# Patient Record
Sex: FEMALE | Race: WHITE | NOT HISPANIC OR LATINO | Employment: UNEMPLOYED | URBAN - NONMETROPOLITAN AREA
[De-identification: names, ages, dates, MRNs, and addresses within clinical notes are randomized per-mention and may not be internally consistent; named-entity substitution may affect disease eponyms.]

---

## 2020-02-26 ENCOUNTER — HOSPITAL ENCOUNTER (INPATIENT)
Facility: HOSPITAL | Age: 80
LOS: 16 days | Discharge: HOME HEALTH | End: 2020-03-13
Attending: INTERNAL MEDICINE | Admitting: INTERNAL MEDICINE

## 2020-02-26 DIAGNOSIS — I61.9 NONTRAUMATIC INTRACEREBRAL HEMORRHAGE, UNSPECIFIED CEREBRAL LOCATION, UNSPECIFIED LATERALITY (HCC): Primary | ICD-10-CM

## 2020-02-26 RX ORDER — IMIPRAMINE HYDROCHLORIDE 10 MG/1
10 TABLET, FILM COATED ORAL EVERY MORNING
COMMUNITY

## 2020-02-26 RX ORDER — POTASSIUM CHLORIDE 750 MG/1
10 TABLET, FILM COATED, EXTENDED RELEASE ORAL DAILY
Status: DISCONTINUED | OUTPATIENT
Start: 2020-02-27 | End: 2020-03-13 | Stop reason: HOSPADM

## 2020-02-26 RX ORDER — CALCIUM CARBONATE 200(500)MG
1 TABLET,CHEWABLE ORAL 2 TIMES DAILY PRN
Status: DISCONTINUED | OUTPATIENT
Start: 2020-02-26 | End: 2020-03-13 | Stop reason: HOSPADM

## 2020-02-26 RX ORDER — POTASSIUM CHLORIDE 750 MG/1
10 TABLET, FILM COATED, EXTENDED RELEASE ORAL DAILY
COMMUNITY

## 2020-02-26 RX ORDER — FUROSEMIDE 20 MG/1
20 TABLET ORAL DAILY
COMMUNITY

## 2020-02-26 RX ORDER — FAMOTIDINE 20 MG/1
40 TABLET, FILM COATED ORAL DAILY
Status: DISCONTINUED | OUTPATIENT
Start: 2020-02-27 | End: 2020-03-13 | Stop reason: HOSPADM

## 2020-02-26 RX ORDER — ATORVASTATIN CALCIUM 40 MG/1
40 TABLET, FILM COATED ORAL NIGHTLY
Status: DISCONTINUED | OUTPATIENT
Start: 2020-02-26 | End: 2020-03-13 | Stop reason: HOSPADM

## 2020-02-26 RX ORDER — FUROSEMIDE 20 MG/1
20 TABLET ORAL DAILY
Status: DISCONTINUED | OUTPATIENT
Start: 2020-02-27 | End: 2020-03-13 | Stop reason: HOSPADM

## 2020-02-26 RX ORDER — ASPIRIN 81 MG/1
81 TABLET, CHEWABLE ORAL DAILY
Status: DISCONTINUED | OUTPATIENT
Start: 2020-02-27 | End: 2020-03-13 | Stop reason: HOSPADM

## 2020-02-26 RX ORDER — BISACODYL 5 MG/1
5 TABLET, DELAYED RELEASE ORAL DAILY PRN
Status: DISCONTINUED | OUTPATIENT
Start: 2020-02-26 | End: 2020-03-13 | Stop reason: HOSPADM

## 2020-02-26 RX ORDER — ONDANSETRON 4 MG/1
4 TABLET, FILM COATED ORAL EVERY 6 HOURS PRN
Status: DISCONTINUED | OUTPATIENT
Start: 2020-02-26 | End: 2020-03-13 | Stop reason: HOSPADM

## 2020-02-26 RX ORDER — METOPROLOL SUCCINATE 50 MG/1
50 TABLET, EXTENDED RELEASE ORAL 2 TIMES DAILY
COMMUNITY
End: 2020-03-13 | Stop reason: HOSPADM

## 2020-02-26 RX ORDER — RANITIDINE 300 MG/1
300 TABLET ORAL NIGHTLY
COMMUNITY
End: 2020-03-13 | Stop reason: HOSPADM

## 2020-02-26 RX ORDER — ACETAMINOPHEN 325 MG/1
650 TABLET ORAL EVERY 4 HOURS PRN
Status: DISCONTINUED | OUTPATIENT
Start: 2020-02-26 | End: 2020-03-13 | Stop reason: HOSPADM

## 2020-02-26 RX ORDER — IMIPRAMINE HYDROCHLORIDE 10 MG/1
10 TABLET, FILM COATED ORAL NIGHTLY
Status: DISCONTINUED | OUTPATIENT
Start: 2020-02-26 | End: 2020-03-13 | Stop reason: HOSPADM

## 2020-02-26 RX ORDER — CARVEDILOL 6.25 MG/1
12.5 TABLET ORAL 2 TIMES DAILY WITH MEALS
Status: DISCONTINUED | OUTPATIENT
Start: 2020-02-26 | End: 2020-03-13 | Stop reason: HOSPADM

## 2020-02-26 RX ORDER — SIMVASTATIN 20 MG
20 TABLET ORAL NIGHTLY
COMMUNITY
End: 2020-03-13 | Stop reason: HOSPADM

## 2020-02-27 ENCOUNTER — APPOINTMENT (OUTPATIENT)
Dept: GENERAL RADIOLOGY | Facility: HOSPITAL | Age: 80
End: 2020-02-27

## 2020-02-27 LAB
ALBUMIN SERPL-MCNC: 3.23 G/DL (ref 3.5–5.2)
ALBUMIN/GLOB SERPL: 1 G/DL
ALP SERPL-CCNC: 92 U/L (ref 39–117)
ALT SERPL W P-5'-P-CCNC: 32 U/L (ref 1–33)
ANION GAP SERPL CALCULATED.3IONS-SCNC: 11.5 MMOL/L (ref 5–15)
AST SERPL-CCNC: 30 U/L (ref 1–32)
BASOPHILS # BLD AUTO: 0.04 10*3/MM3 (ref 0–0.2)
BASOPHILS NFR BLD AUTO: 0.6 % (ref 0–1.5)
BILIRUB SERPL-MCNC: 0.6 MG/DL (ref 0.2–1.2)
BUN BLD-MCNC: 22 MG/DL (ref 8–23)
BUN/CREAT SERPL: 25.9 (ref 7–25)
CALCIUM SPEC-SCNC: 9.8 MG/DL (ref 8.6–10.5)
CHLORIDE SERPL-SCNC: 104 MMOL/L (ref 98–107)
CO2 SERPL-SCNC: 24.5 MMOL/L (ref 22–29)
CREAT BLD-MCNC: 0.85 MG/DL (ref 0.57–1)
DEPRECATED RDW RBC AUTO: 46.2 FL (ref 37–54)
EOSINOPHIL # BLD AUTO: 0.08 10*3/MM3 (ref 0–0.4)
EOSINOPHIL NFR BLD AUTO: 1.3 % (ref 0.3–6.2)
ERYTHROCYTE [DISTWIDTH] IN BLOOD BY AUTOMATED COUNT: 14.4 % (ref 12.3–15.4)
GFR SERPL CREATININE-BSD FRML MDRD: 64 ML/MIN/1.73
GLOBULIN UR ELPH-MCNC: 3.4 GM/DL
GLUCOSE BLD-MCNC: 97 MG/DL (ref 65–99)
HCT VFR BLD AUTO: 40.2 % (ref 34–46.6)
HGB BLD-MCNC: 12.5 G/DL (ref 12–15.9)
IMM GRANULOCYTES # BLD AUTO: 0.03 10*3/MM3 (ref 0–0.05)
IMM GRANULOCYTES NFR BLD AUTO: 0.5 % (ref 0–0.5)
LYMPHOCYTES # BLD AUTO: 1.44 10*3/MM3 (ref 0.7–3.1)
LYMPHOCYTES NFR BLD AUTO: 23 % (ref 19.6–45.3)
MAGNESIUM SERPL-MCNC: 2.2 MG/DL (ref 1.6–2.4)
MCH RBC QN AUTO: 27.8 PG (ref 26.6–33)
MCHC RBC AUTO-ENTMCNC: 31.1 G/DL (ref 31.5–35.7)
MCV RBC AUTO: 89.3 FL (ref 79–97)
MONOCYTES # BLD AUTO: 0.84 10*3/MM3 (ref 0.1–0.9)
MONOCYTES NFR BLD AUTO: 13.4 % (ref 5–12)
NEUTROPHILS # BLD AUTO: 3.83 10*3/MM3 (ref 1.7–7)
NEUTROPHILS NFR BLD AUTO: 61.2 % (ref 42.7–76)
NRBC BLD AUTO-RTO: 0 /100 WBC (ref 0–0.2)
PLATELET # BLD AUTO: 382 10*3/MM3 (ref 140–450)
PMV BLD AUTO: 11.6 FL (ref 6–12)
POTASSIUM BLD-SCNC: 4.2 MMOL/L (ref 3.5–5.2)
PROT SERPL-MCNC: 6.6 G/DL (ref 6–8.5)
RBC # BLD AUTO: 4.5 10*6/MM3 (ref 3.77–5.28)
SODIUM BLD-SCNC: 140 MMOL/L (ref 136–145)
WBC NRBC COR # BLD: 6.26 10*3/MM3 (ref 3.4–10.8)

## 2020-02-27 PROCEDURE — 92611 MOTION FLUOROSCOPY/SWALLOW: CPT | Performed by: SPEECH-LANGUAGE PATHOLOGIST

## 2020-02-27 PROCEDURE — 97167 OT EVAL HIGH COMPLEX 60 MIN: CPT

## 2020-02-27 PROCEDURE — 85025 COMPLETE CBC W/AUTO DIFF WBC: CPT | Performed by: INTERNAL MEDICINE

## 2020-02-27 PROCEDURE — 97162 PT EVAL MOD COMPLEX 30 MIN: CPT

## 2020-02-27 PROCEDURE — 74230 X-RAY XM SWLNG FUNCJ C+: CPT | Performed by: RADIOLOGY

## 2020-02-27 PROCEDURE — 80053 COMPREHEN METABOLIC PANEL: CPT | Performed by: INTERNAL MEDICINE

## 2020-02-27 PROCEDURE — 97116 GAIT TRAINING THERAPY: CPT

## 2020-02-27 PROCEDURE — 97110 THERAPEUTIC EXERCISES: CPT

## 2020-02-27 PROCEDURE — 97530 THERAPEUTIC ACTIVITIES: CPT

## 2020-02-27 PROCEDURE — 74230 X-RAY XM SWLNG FUNCJ C+: CPT

## 2020-02-27 PROCEDURE — 83735 ASSAY OF MAGNESIUM: CPT | Performed by: INTERNAL MEDICINE

## 2020-02-27 RX ADMIN — ATORVASTATIN CALCIUM 40 MG: 40 TABLET, FILM COATED ORAL at 22:08

## 2020-02-27 RX ADMIN — CARVEDILOL 12.5 MG: 25 TABLET, FILM COATED ORAL at 17:04

## 2020-02-27 RX ADMIN — IMIPRAMINE HYDROCHLORIDE 10 MG: 10 TABLET ORAL at 22:08

## 2020-02-27 RX ADMIN — CARVEDILOL 12.5 MG: 25 TABLET, FILM COATED ORAL at 09:05

## 2020-02-27 RX ADMIN — MAGNESIUM GLUCONATE 500 MG ORAL TABLET 400 MG: 500 TABLET ORAL at 09:06

## 2020-02-27 RX ADMIN — ASPIRIN 81 MG: 81 TABLET, CHEWABLE ORAL at 09:05

## 2020-02-27 RX ADMIN — POTASSIUM CHLORIDE 10 MEQ: 750 TABLET, FILM COATED, EXTENDED RELEASE ORAL at 09:05

## 2020-02-27 RX ADMIN — FAMOTIDINE 40 MG: 20 TABLET, FILM COATED ORAL at 09:05

## 2020-02-27 RX ADMIN — FUROSEMIDE 20 MG: 20 TABLET ORAL at 09:05

## 2020-02-28 PROCEDURE — 97110 THERAPEUTIC EXERCISES: CPT

## 2020-02-28 PROCEDURE — 97530 THERAPEUTIC ACTIVITIES: CPT

## 2020-02-28 PROCEDURE — 97535 SELF CARE MNGMENT TRAINING: CPT

## 2020-02-28 PROCEDURE — 97116 GAIT TRAINING THERAPY: CPT

## 2020-02-28 RX ADMIN — CARVEDILOL 12.5 MG: 25 TABLET, FILM COATED ORAL at 09:01

## 2020-02-28 RX ADMIN — CARVEDILOL 12.5 MG: 25 TABLET, FILM COATED ORAL at 17:10

## 2020-02-28 RX ADMIN — ASPIRIN 81 MG: 81 TABLET, CHEWABLE ORAL at 09:01

## 2020-02-28 RX ADMIN — MAGNESIUM GLUCONATE 500 MG ORAL TABLET 400 MG: 500 TABLET ORAL at 09:01

## 2020-02-28 RX ADMIN — FAMOTIDINE 40 MG: 20 TABLET, FILM COATED ORAL at 09:01

## 2020-02-28 RX ADMIN — FUROSEMIDE 20 MG: 20 TABLET ORAL at 09:01

## 2020-02-28 RX ADMIN — ATORVASTATIN CALCIUM 40 MG: 40 TABLET, FILM COATED ORAL at 21:14

## 2020-02-28 RX ADMIN — IMIPRAMINE HYDROCHLORIDE 10 MG: 10 TABLET ORAL at 21:14

## 2020-02-28 RX ADMIN — POTASSIUM CHLORIDE 10 MEQ: 750 TABLET, FILM COATED, EXTENDED RELEASE ORAL at 09:01

## 2020-02-29 PROCEDURE — 97535 SELF CARE MNGMENT TRAINING: CPT

## 2020-02-29 PROCEDURE — 92523 SPEECH SOUND LANG COMPREHEN: CPT

## 2020-02-29 PROCEDURE — 97110 THERAPEUTIC EXERCISES: CPT

## 2020-02-29 PROCEDURE — 97530 THERAPEUTIC ACTIVITIES: CPT

## 2020-02-29 PROCEDURE — 97116 GAIT TRAINING THERAPY: CPT

## 2020-02-29 RX ADMIN — FAMOTIDINE 40 MG: 20 TABLET, FILM COATED ORAL at 09:01

## 2020-02-29 RX ADMIN — ATORVASTATIN CALCIUM 40 MG: 40 TABLET, FILM COATED ORAL at 21:18

## 2020-02-29 RX ADMIN — FUROSEMIDE 20 MG: 20 TABLET ORAL at 09:02

## 2020-02-29 RX ADMIN — ASPIRIN 81 MG: 81 TABLET, CHEWABLE ORAL at 09:02

## 2020-02-29 RX ADMIN — MAGNESIUM GLUCONATE 500 MG ORAL TABLET 400 MG: 500 TABLET ORAL at 09:01

## 2020-02-29 RX ADMIN — CARVEDILOL 12.5 MG: 25 TABLET, FILM COATED ORAL at 17:58

## 2020-02-29 RX ADMIN — CARVEDILOL 12.5 MG: 25 TABLET, FILM COATED ORAL at 09:02

## 2020-02-29 RX ADMIN — IMIPRAMINE HYDROCHLORIDE 10 MG: 10 TABLET ORAL at 21:18

## 2020-02-29 RX ADMIN — BISACODYL 5 MG: 5 TABLET, COATED ORAL at 05:47

## 2020-02-29 RX ADMIN — POTASSIUM CHLORIDE 10 MEQ: 750 TABLET, FILM COATED, EXTENDED RELEASE ORAL at 09:02

## 2020-03-01 RX ADMIN — CARVEDILOL 12.5 MG: 25 TABLET, FILM COATED ORAL at 09:53

## 2020-03-01 RX ADMIN — ASPIRIN 81 MG: 81 TABLET, CHEWABLE ORAL at 09:53

## 2020-03-01 RX ADMIN — POTASSIUM CHLORIDE 10 MEQ: 750 TABLET, FILM COATED, EXTENDED RELEASE ORAL at 09:52

## 2020-03-01 RX ADMIN — CARVEDILOL 12.5 MG: 25 TABLET, FILM COATED ORAL at 17:39

## 2020-03-01 RX ADMIN — FUROSEMIDE 20 MG: 20 TABLET ORAL at 09:52

## 2020-03-01 RX ADMIN — FAMOTIDINE 40 MG: 20 TABLET, FILM COATED ORAL at 09:52

## 2020-03-01 RX ADMIN — MAGNESIUM GLUCONATE 500 MG ORAL TABLET 400 MG: 500 TABLET ORAL at 09:52

## 2020-03-01 RX ADMIN — IMIPRAMINE HYDROCHLORIDE 10 MG: 10 TABLET ORAL at 20:55

## 2020-03-01 RX ADMIN — ATORVASTATIN CALCIUM 40 MG: 40 TABLET, FILM COATED ORAL at 20:55

## 2020-03-01 NOTE — PROGRESS NOTES
Rehabilitation Nursing  Inpatient Rehabilitation Plan of Care Note    Plan of Care  Copy from [facility]    Pain Management (Active)  Current Status (2/26/2020 4:32:00 PM): Potential for pain  Weekly Goal: No pain this week  Discharge Goal: no pain    Safety    Potential for Injury (Active)  Current Status (2/26/2020 4:32:00 PM): At risk for injury  Weekly Goal: No injury this week  Discharge Goal: No injury    Signed by: Whitney Kaminski, Nurse

## 2020-03-01 NOTE — PROGRESS NOTES
PROGRESS NOTE         Patient Identification:  Name:  Connie Arango  Age:  79 y.o.  Sex:  female  :  1940  MRN:  [id number]  Visit Number:  [id number]  Primary Care Provider:  Luis Enrique Prieto MD         LOS: 5 days       ----------------------------------------------------------------------------------------------------------------------  Subjective       Chief Complaints:    Intracranial hemorrhage   Encephalopathy   debility      Interval History:      No issues reported overnight and patient seems to be very stable with no chest pain shortness of breath nausea vomiting or diarrhea and no fever reported overnight.  Vitals are stable with very well controlled blood pressure.  Labs in progress.  Swallowing eval on 2020 with no aspiration.    Patient participated with occupational therapy, speech therapy and physical therapy on 20 and 20. Bed-chair transfer is at contact guard with verbal and nonverbal cues using a wheelchair. Chair-bed transfer is at contact guard with verbal and nonverbal cues using a wheelchair. Sit-stand and stand-sit transfer is at contact guard with verbal and nonverbal cues using a front-wheeled walker. Toilet transfer training is at contact guard x2 trials using the grab bars/safety frame and raised toilet seat. Patient ambulated at contact guard with verbal and nonverbal cues using a front-wheeled walker 150 feet and 60 feet with multiple standing rest breaks.     Patient participated with and was evaluated by physical therapy, occupational therapy, and speech therapy on 20. Planned Therapy Interventions (PT Eval): balance training, bed mobility training, gait training, motor coordination training, neuromuscular re-education, patient/family education, strengthening, and transfer training. Frequency of Treatment (PT Eval): 5 times per week. Planned Therapy Interventions (OT Eval): activity tolerance training, BADL retraining, neuromuscular  control/coordination retraining, occupation/activity based interventions, patient/caregiver education/training, ROM/therapeutic exercise, strengthening exercise, and transfer/mobility retraining.  SLP Recommendation and Plan    Impression: Per this evaluation, Ms. Arango presents w/ moderate-severely impaired oral phase, wfl pharyngeal phase swallowing fnx. Pt's oral dysphagia is felt to be likely 2/2 overall generalized weakness in combination w/ cognitive status. Increased oral prep time w/ mixed phasic/rotary mastication pattern. No laryngeal penetration or aspiration evidenced. She is felt to most benefit from continued modified po diet of puree consistency w/ thin liquids. Meds whole in puree.  Single presentation only. Crushed prn.    Recommendations:   1. Puree consistency w/ thin liquids.  2. Meds whole in puree/thins. Single presentation. Crushed prn.  3. Chris precautions.   4. Oral care protocol.   5. Supervision w/ all po intake  6. Upright and centered for all po intake      Review of Systems:    Constitutional: no fever, chills and night sweats.  Eyes: no eye drainage, itching or redness.  HEENT: no mouth sores, dysphagia or nose bleed.  Respiratory: no for shortness of breath, cough or production of sputum.  Cardiovascular: no chest pain, no palpitations, no orthopnea.  Gastrointestinal: no nausea, vomiting or diarrhea. No abdominal pain, hematemesis or rectal bleeding.  Genitourinary: no dysuria or polyuria.  Hematologic/lymphatic: no lymph node abnormalities, no easy bruising or easy bleeding.  Musculoskeletal: no muscle or joint pain.  Skin: No rash and no itching.  Neurological: no loss of consciousness, no seizure, no headache.  Behavioral/Psych: no depression or suicidal ideation.  Endocrine: no hot flashes.  Immunologic: negative.  ----------------------------------------------------------------------------------------------------------------------      Objective       [facility]  Meds:    aspirin 81 mg Oral Daily   atorvastatin 40 mg Oral Nightly   carvedilol 12.5 mg Oral BID With Meals   famotidine 40 mg Oral Daily   furosemide 20 mg Oral Daily   imipramine 10 mg Oral Nightly   magnesium oxide 400 mg Oral Daily   potassium chloride 10 mEq Oral Daily        ----------------------------------------------------------------------------------------------------------------------    Vital Signs:  Temp:  [97.6 °F (36.4 °C)-98.4 °F (36.9 °C)] 98.4 °F (36.9 °C)  Heart Rate:  [69-76] 76  Resp:  [16-20] 20  BP: (100-114)/(48-63) 110/63  No data found.  SpO2 Percentage    02/29/20 1910 03/01/20 0950 03/01/20 1906   SpO2: 92% 93% 92%     SpO2:  [92 %-93 %] 92 %  on   ;   Device (Oxygen Therapy): room air    Body mass index is 26.45 kg/m².  Wt Readings from Last 3 Encounters:   02/26/20 72 kg (158 lb 11.7 oz)        Intake/Output Summary (Last 24 hours) at 3/2/2020 0612  Last data filed at 3/2/2020 0507  Gross per 24 hour   Intake 840 ml   Output --   Net 840 ml     Diet Pureed; Thin  ----------------------------------------------------------------------------------------------------------------------      Physical Exam:     General Appearance: alert and pleasant.  No acute distress.    Head: normocephalic, without obvious abnormality and atraumatic     Eyes: lids and lashes normal, conjunctivae and sclerae normal, no icterus, no pallor, corneas clear and PERRLA     Ears: ears appear intact with no abnormalities noted     Nose: nares normal, septum midline, mucosa normal and no drainage                Throat: no oral lesions, no thrush, oral mucosa moist and oopharynx normal     Neck: no adenopathy, supple, trachea midline, no thyromegaly, no carotid bruit and no JVD     Back: no kyphosis present, no scoliosis present, no skin lesions, erythema, or scars, no tenderness to percussion or palpation and range of motion normal     Lungs: clear to auscultation, respirations regular, respirations even  and  respirations unlabored. No accessory muscle use.      Heart:: regular rhythm & normal rate, normal S1, S2, no murmur, no gallop, no rub and no click.  Chest wall with no abnormalities observed. PMI nondisplaced     Abdomen: normal bowel sounds in all quadrants, no masses, no hepatomegaly, no splenomegaly, soft non-tender, no guarding and no rebound tenderness     Extremities: no edema, no cyanosis, no redness, no tenderness, no clubbing     Musculoskeletal: joints with no effusion nor erythema nor warmth.  Pedal pulses palpable and equal bilaterally     Skin: no bleeding, bruising or rash and no lesions noted     Lymph Nodes: no palpable adenopathy     Neurologic: Alert and awake.  Mild aphasia.              Sensory intact in BLE and BUE.              Motor strength Generalized weakness  ----------------------------------------------------------------------------------------------------------------------            Results from last 7 days   Lab Units 02/27/20  0657   WBC 10*3/mm3 6.26   HEMOGLOBIN g/dL 12.5   HEMATOCRIT % 40.2   MCV fL 89.3   MCHC g/dL 31.1*   PLATELETS 10*3/mm3 382     Results from last 7 days   Lab Units 02/27/20  0657   SODIUM mmol/L 140   POTASSIUM mmol/L 4.2   MAGNESIUM mg/dL 2.2   CHLORIDE mmol/L 104   CO2 mmol/L 24.5   BUN mg/dL 22   CREATININE mg/dL 0.85   EGFR IF NONAFRICN AM mL/min/1.73 64   CALCIUM mg/dL 9.8   GLUCOSE mg/dL 97   ALBUMIN g/dL 3.23*   BILIRUBIN mg/dL 0.6   ALK PHOS U/L 92   AST (SGOT) U/L 30   ALT (SGPT) U/L 32   Estimated Creatinine Clearance: 52.4 mL/min (by C-G formula based on SCr of 0.85 mg/dL).  No results found for: AMMONIA    No results found for: HGBA1C, POCGLU  No results found for: HGBA1C  No results found for: TSH, FREET4    No results found for: BLOODCX  No results found for: URINECX  No results found for: WOUNDCX  No results found for: STOOLCX  No results found for: RESPCX  Pain Management Panel     There is no flowsheet data to display.             ----------------------------------------------------------------------------------------------------------------------  Imaging Results (Last 24 Hours)     ** No results found for the last 24 hours. **          ----------------------------------------------------------------------------------------------------------------------    Assessment/Plan       Assessment/Plan     ASSESSMENT:    Multifocal CVA/left parietotemporal ICH   Small acute corical ischemic infarcts in the left parietal Lobe and right parieto-occipital region  Left parietotemporal hemorrhage  Encephalopathy  Acute hypoxia respiratory failure  HTN  Hypotension  Small bilateral pleural effusions  Dysphagia  Global aphasia     PLAN:      No issues reported overnight and patient seems to be very stable with no chest pain shortness of breath nausea vomiting or diarrhea and no fever reported overnight.  Vitals are stable with very well controlled blood pressure.  Labs in progress.  Swallowing eval on 2/27/2020 with no aspiration.    Patient participated with occupational therapy, speech therapy and physical therapy on 2/28/20 and 2/29/20. Bed-chair transfer is at contact guard with verbal and nonverbal cues using a wheelchair. Chair-bed transfer is at contact guard with verbal and nonverbal cues using a wheelchair. Sit-stand and stand-sit transfer is at contact guard with verbal and nonverbal cues using a front-wheeled walker. Toilet transfer training is at contact guard x2 trials using the grab bars/safety frame and raised toilet seat. Patient ambulated at contact guard with verbal and nonverbal cues using a front-wheeled walker 150 feet and 60 feet with multiple standing rest breaks.     Patient participated with and was evaluated by physical therapy, occupational therapy, and speech therapy on 2/27/20. Planned Therapy Interventions (PT Eval): balance training, bed mobility training, gait training, motor coordination training, neuromuscular  re-education, patient/family education, strengthening, and transfer training. Frequency of Treatment (PT Eval): 5 times per week. Planned Therapy Interventions (OT Eval): activity tolerance training, BADL retraining, neuromuscular control/coordination retraining, occupation/activity based interventions, patient/caregiver education/training, ROM/therapeutic exercise, strengthening exercise, and transfer/mobility retraining.  SLP Recommendation and Plan   Impression: Per this evaluation, Ms. Arango presents w/ moderate-severely impaired oral phase, wfl pharyngeal phase swallowing fnx. Ptatient's oral dysphagia is felt to be likely 2/2 overall generalized weakness in combination w/ cognitive status. Increased oral prep time w/ mixed phasic/rotary mastication pattern. No laryngeal penetration or aspiration evidenced. She is felt to most benefit from continued modified po diet of puree consistency w/ thin liquids. Meds whole in puree.  Single presentation only. Crushed prn.    Recommendations:   1. Puree consistency w/ thin liquids.  2. Meds whole in puree/thins. Single presentation. Crushed prn.  3. Chris precautions.   4. Oral care protocol.   5. Supervision w/ all po intake  6. Upright and centered for all po intake        Patient continues to require intensive inpatient physical therapy for therapeutic exercises, range of motion, endurance, gait training, safety, stairs training, strengthening for at least 1 to 2 hours a day for 5 to 6 days a week as well as occupational therapy for dressing, ADLs, feeding, home skills, safety and toileting techniques for 1 to 2 hours a day for 5 to 6 days a week, as well as speech and language pathology therapy for communication, expression, dysphasia, aspiration needs for 30 to 90 minutes a day for 5 to 6 days a week.        Code Status:   Code Status and Medical Interventions:   Ordered at: 02/26/20 1948     Code Status:    CPR     Medical Interventions (Level of Support Prior to  Arrest):    Full         Sean Coppola MD  03/02/20  6:12 AM

## 2020-03-02 PROCEDURE — 97110 THERAPEUTIC EXERCISES: CPT | Performed by: OCCUPATIONAL THERAPIST

## 2020-03-02 PROCEDURE — 97530 THERAPEUTIC ACTIVITIES: CPT

## 2020-03-02 PROCEDURE — 97116 GAIT TRAINING THERAPY: CPT

## 2020-03-02 PROCEDURE — 97110 THERAPEUTIC EXERCISES: CPT

## 2020-03-02 PROCEDURE — 97530 THERAPEUTIC ACTIVITIES: CPT | Performed by: OCCUPATIONAL THERAPIST

## 2020-03-02 PROCEDURE — 92507 TX SP LANG VOICE COMM INDIV: CPT | Performed by: SPEECH-LANGUAGE PATHOLOGIST

## 2020-03-02 RX ADMIN — CARVEDILOL 12.5 MG: 25 TABLET, FILM COATED ORAL at 09:00

## 2020-03-02 RX ADMIN — ATORVASTATIN CALCIUM 40 MG: 40 TABLET, FILM COATED ORAL at 21:09

## 2020-03-02 RX ADMIN — MAGNESIUM GLUCONATE 500 MG ORAL TABLET 400 MG: 500 TABLET ORAL at 09:00

## 2020-03-02 RX ADMIN — CARVEDILOL 12.5 MG: 25 TABLET, FILM COATED ORAL at 18:00

## 2020-03-02 RX ADMIN — POTASSIUM CHLORIDE 10 MEQ: 750 TABLET, FILM COATED, EXTENDED RELEASE ORAL at 08:59

## 2020-03-02 RX ADMIN — ASPIRIN 81 MG: 81 TABLET, CHEWABLE ORAL at 09:00

## 2020-03-02 RX ADMIN — FAMOTIDINE 40 MG: 20 TABLET, FILM COATED ORAL at 08:59

## 2020-03-02 RX ADMIN — FUROSEMIDE 20 MG: 20 TABLET ORAL at 08:59

## 2020-03-02 RX ADMIN — IMIPRAMINE HYDROCHLORIDE 10 MG: 10 TABLET ORAL at 21:09

## 2020-03-02 NOTE — PROGRESS NOTES
Rehabilitation Nursing  Inpatient Rehabilitation Plan of Care Note    Plan of Care  Copy from [facility]    Pain Management (Active)  Current Status (2/26/2020 4:32:00 PM): Potential for pain  Weekly Goal: No pain this week  Discharge Goal: no pain    Safety    Potential for Injury (Active)  Current Status (2/26/2020 4:32:00 PM): At risk for injury  Weekly Goal: No injury this week  Discharge Goal: No injury    Signed by: Jena Browning, Nurse

## 2020-03-02 NOTE — THERAPY TREATMENT NOTE
Inpatient Rehabilitation - Occupational Therapy Treatment Note     [facility]     Patient Name: Connie Arango  : 1940  MRN: [id number]    Today's Date: 3/2/2020                 Admit Date: 2020      Visit Dx:  No diagnosis found.    Patient Active Problem List   Diagnosis   • ICH (intracerebral hemorrhage) (CMS/[facility])         Therapy Treatment    IRF Treatment Summary     Row Name 20 1400             Evaluation/Treatment Time and Intent    Subjective Information  no complaints  -      Existing Precautions/Restrictions  fall;seizures aphasia  -      Document Type  therapy note (daily note)  -      Recorded by [] Marion Browning OT      Row Name 20 1400             Upper Extremity Seated Therapeutic Exercise    Exercise Type, Seated Upper Extremity (Therapeutic Exercise)  AROM (active range of motion) b/c functional act tolerance  -      Expected Outcomes, Seated Upper Extremity (Therapeutic Exercise)  improve functional tolerance, self-care activity  -      Recorded by [] Marion Browning OT        User Key  (r) = Recorded By, (t) = Taken By, (c) = Cosigned By    Initials Name Effective Dates     Marion Browning OT 19 -                  OT Recommendation and Plan                 OT IRF GOALS     Row Name 20 1523             Bathing Goal 1 (OT-IRF)    [place] Level (Bathing Goal 1, OT-IRF)  moderate assist (50-74% patient effort)  -      Time Frame (Bathing Goal 1, OT-IRF)  long term goal (LTG);by discharge  -         LB Dressing Goal 1 (OT-IRF)    [place] (LB Dressing Goal 1, OT-IRF)  moderate assist (50-74% patient effort)  -      Time Frame (LB Dressing Goal 1, OT-IRF)  short term goal (STG)  -         LB Dressing Goal 2 (OT-IRF)    [place] (LB Dressing Goal 2, OT-IRF)  minimum assist (75% or more patient effort)  -      Time Frame (LB Dressing Goal 2, OT-IRF)  long term goal (LTG);by discharge  -         Toileting  Goal 1 (OT-IRF)    [place] Level (Toileting Goal 1, OT-IRF)  maximum assist (25-49% patient effort)  -      Time Frame (Toileting Goal 1, OT-IRF)  short term goal (STG)  -         Toileting Goal 2 (OT-IRF)    [place] Level (Toileting Goal 2, OT-IRF)  moderate assist (50-74% patient effort)  -      Time Frame (Toileting Goal 2, OT-IRF)  long term goal (LTG);by discharge  -        User Key  (r) = Recorded By, (t) = Taken By, (c) = Cosigned By    Initials Name Provider Type     Daylin Rebolledo, OT Occupational Therapist                     Time Calculation:     Time Calculation- OT     Row Name 03/02/20 1422 03/02/20 1420          Time Calculation- OT    OT Start Time  1245  -AH  1000  -     OT Stop Time  1320  -AH  1045  -     OT Time Calculation (min)  35 min  -AH  45 min  -       User Key  (r) = Recorded By, (t) = Taken By, (c) = Cosigned By    Initials Name Provider Type     Marion Browning, OT Occupational Therapist          Therapy Charges for Today     Code Description Service Date Service Provider Modifiers Qty    85569829148  OT THERAPEUTIC ACT EA 15 MIN 3/2/2020 Marion Browning OT GO 2    70003025325 HC OT THER PROC EA 15 MIN 3/2/2020 Marion Browning OT GO 3                   Marion Browning OT  3/2/2020

## 2020-03-02 NOTE — PROGRESS NOTES
Inpatient Rehabilitation Functional Measures Assessment and Plan of Care    Plan of Care      Functional Measures  KRISTEN Eating:  Eating Score = 5. Patient is supervision/set-up for eating,  requiring: No assistive devices were required.  KRISTEN Grooming:  KRISTEN Bathing:  KRISTEN Upper Body Dressing:  KRISTEN Lower Body Dressing:  KRISTEN Toileting:    KRISTEN Bladder Management  Level of Assistance:  Frequency/Number of Accidents this Shift:    KRISTEN Bowel Management  Level of Assistance:  Frequency/Number of Accidents this Shift:    KRISTEN Bed/Chair/Wheelchair Transfer:  KRISTEN Toilet Transfer:  KRISTEN Tub/Shower Transfer:    Previously Documented Mode of Locomotion at Discharge:  KRISTEN Expected Mode of Locomotion at Discharge:  KRISTEN Walk/Wheelchair:  KRISTEN Stairs:    KRISTEN Comprehension:  Auditory comprehension is the usual mode. Patient does not  comprehend complex/abstract information in their primary language without  assistance from a helper. Comprehension Score = 3, Moderate Prompting. Patient  comprehends basic daily needs or ideas 50-74% of the time. Patient requires  moderate/some prompting. No assistive devices were required.  KRISTEN Expression:  Vocal expression is the usual mode. Patient does not express  complex/abstract information in their primary language without a helper.  Expression Score = 3, Moderate Prompting. Patient expresses basic daily needs or  ideas 50-74% of the time. Patient requires moderate/some prompting. No assistive  devices were required.  KRISTEN Social Interaction:  Social Interaction Score = 4, Minimal Direction.  Patient interacts appropriately 75-90% of the time. Patient requires  minimal/occasional direction for the following behavior(s):  KRISTEN Problem Solving:  Patient does not make appropriate decisions in order to  solve complex problems without assistance from a helper. Problem Solving Score =  3, Moderate Direction. Patient makes appropriate decisions in order to solve  routine problems 50-74% of the time. Patient  requires moderate/some direction  for the following behavior(s):  KRISTEN Memory:  Activity was not observed.    Therapy Mode Minutes  Occupational Therapy:  Physical Therapy:  Speech Language Pathology: Individual: 45 minutes.    Discharge Functional Goals:    Signed by: Alexus Yanes Speech therapist

## 2020-03-02 NOTE — PROGRESS NOTES
Rehabilitation Nursing  Inpatient Rehabilitation Plan of Care Note    Plan of Care  Copy from POC    Pain    Pain Management (Active)  Current Status (2/26/2020 4:32:00 PM): Potential for pain  Weekly Goal: No pain this week  Discharge Goal: no pain    Safety    Potential for Injury (Active)  Current Status (2/26/2020 4:32:00 PM): At risk for injury  Weekly Goal: No injury this week  Discharge Goal: No injury    RN Interventions    Safety -  RN: Assess safetuy Q 1 hour and PRN..Meds per MD order..Side rails up Xs2..Call  bell and items in reach [RN]    Pain -  RN: Assess pain Q shift and PRN..Meds per MD order..Call bell and items in reach  [RN]    Signed by: Nurse Kristen

## 2020-03-02 NOTE — PROGRESS NOTES
Inpatient Rehabilitation Plan of Care Note    Plan of Care  Mobility    [PT] Bed/Chair/Wheelchair(Active)  Current Status(03/02/2020): CGA  Weekly Goal(03/09/2020): Sup  Discharge Goal: Sup    [PT] Walk(Active)  Current Status(03/02/2020): amb 160' RW CGA  Weekly Goal(03/09/2020): amb 150' RW CGA  Discharge Goal: amb 300' RW Sup    Signed by: Heidi Ingram, Physical Therapist

## 2020-03-02 NOTE — PROGRESS NOTES
Inpatient Rehabilitation Functional Measures Assessment and Plan of Care    Plan of Care  Self Care    [OT] Dressing (Lower)(Active)  Current Status(03/02/2020): MaxA  Weekly Goal(03/10/2020): ModA  Discharge Goal: Sb    [OT] Toileting(Active)  Current Status(03/02/2020): TotalA  Weekly Goal(03/10/2020): MaxA  Discharge Goal: ModA    Performed Intervention(s)  ADL re-trng, ther ex/act, pt / family education    Functional Measures  KRISTEN Eating:  KRISTEN Grooming:  KRISTEN Bathing:  KRISTEN Upper Body Dressing:  KRISTEN Lower Body Dressing:  KRISTEN Toileting:    KRISTEN Bladder Management  Level of Assistance:  Frequency/Number of Accidents this Shift:    KRISTEN Bowel Management  Level of Assistance:  Frequency/Number of Accidents this Shift:    KRISTEN Bed/Chair/Wheelchair Transfer:  KRISTEN Toilet Transfer:  KRISTEN Tub/Shower Transfer:    Previously Documented Mode of Locomotion at Discharge:  KRISTEN Expected Mode of Locomotion at Discharge:  KRISTEN Walk/Wheelchair:  KRISTEN Stairs:    KRISTEN Comprehension:  KRISTEN Expression:  KRISTEN Social Interaction:  KRISTEN Problem Solving:  KRISTEN Memory:    Therapy Mode Minutes  Occupational Therapy: Individual: 75 minutes.  Physical Therapy:  Speech Language Pathology:    Discharge Functional Goals:    Signed by: Hannah Browning, Occupational Therapist

## 2020-03-02 NOTE — PLAN OF CARE
Pt actively participated in formal s/l/cog tx this am. Continues to present w/ moderate-severe deficits negatively impacting pt's ability to functionally communicate wants and needs. Continue tx per poc.  Problem: Patient Care Overview  Goal: Plan of Care Review  Outcome: Ongoing (interventions implemented as appropriate)     Problem: Communication Impairment (IRF) (Adult)  Goal: Optimal/Safe Level of Communication [place]  Outcome: Ongoing (interventions implemented as appropriate)  Flowsheets (Taken 2/29/2020 1409 by Patience Yanes MA,CCC-SLP)  Optimal/Safe Level of Communication [place]: demonstrating adequate progress  Goal: Optimal Quality of Life in Relation to Communication  Outcome: Ongoing (interventions implemented as appropriate)  Flowsheets (Taken 2/29/2020 1409 by Patience Yanes MA,CCC-SLP)  Optimal Quality of Life in Relation to Communication: demonstrating adequate progress

## 2020-03-02 NOTE — THERAPY TREATMENT NOTE
"Inpatient Rehabilitation - Speech Language Pathology Treatment Note  [facility]     Patient Name: Connie Arango  : 1940  MRN: [id number]  Today's Date: 3/2/2020         Admit Date: 2020    Visit Dx:    No diagnosis found.  Patient Active Problem List   Diagnosis   • ICH (intracerebral hemorrhage) (CMS/[facility])        Therapy Treatment     Ms. Arango is seen this am in speech therapy office for formal s/l therapy to address deficits. She is positioned upright and centered in wheelchair w/ gait belt attached. She is a/a cooperative to participate. She is evidenced w/ some frustration during today's tx session  pt awareness of her deficits.    Long Term Goal:  1.Pt will improve receptive language skills to allow for maximal communication and safety in adls.   2.Pt will improve expressive language skills to allow for maximal communication and safety in adls.      Short Term Goals:  1. Pt will receptively identify common tangible objects from field of 2 w/ 50% acc and mod cues over 3 consecutive sessions.   *pt is able to identify \"spoon\" from FO2 given max cues w/ 50% acc. Pt unable to receptively identify any other common tangible objects. Pt is able to match letters F-Z w/ 90% acc given mod cues.  2. Pt will receptively demonstrate object fnx w/ 60% acc and mod cues over 3 consecutive sessions.   *pt able to receptively demonstrate object fnx of \"spoon and cup\" w/ 60% acc given mod-max cues to initiate fnx of objects. Significant perseveration during this task.  3. Pt will id body parts w/ 40% acc and mod cues over 3 consecutive sessions.   *not directly addressed this session.  4. Pt will follow simple one step directives 3/5 opp w/ mod-max cues over 3 consecutive sessions.   *pt able to follow simple one step directives related to table top tasks w/ 50% acc given mod-max cues. Pt often requires multiple re-directions during tasks to sustain attention.  5. Pt will verbally produce biographical information " "3/5 opp w/ mod-max cues over 3 consecutive session.   *not directly addressed this session.  6. Pt will verbally respond to simple y/n questions w/ 75% acc and mod cues over 3 consecutive sessions.   *pt verbally responds to simple y/n questions w/ 65% acc related to adls, table top tasks. Pt often perseverates during this task.  7. Pt will confrontation name common tangible objects 2/5 opp w/ mod-max cues over 3 consecutive sessions.   *pt able to name \"spoon\" and \"cup\" after max cues and models from SLP.  8. Pt will perform rote speech tasks 3/5 opp w/ mod cues over 3 consecutive sessions.   *pt able to name letters A-Z given mod-max cues w/ visual stimuli on table top. Perseveration noted w/ letters \"w, x, y, z\".     *Additional goals to be added/modified as necessary.      Thank you-  Alexus Yanes M.S., CCC-SLP      EDUCATION  The patient has been educated in the following areas:   Cognitive Impairment Communication Impairment.    SLP Recommendation and Plan    Continue tx per poc.    Time Calculation:     Time Calculation- SLP     Time Calculation- SLP     Row Name 03/02/20 1350    SLP Start Time  0915  -Recorded by: AMARIS    SLP Stop Time  1000  -Recorded by: AMARIS    SLP Time Calculation (min)  45 min  -Recorded by: AMARIS    SLP Non-Billable Time (min)  15 min  -Recorded by: AMARIS    SLP - Next Appointment  03/03/20  -Recorded by: AMARIS            User Key     Initials Name Provider Type    Alexus Lamar MS CCC-SLP Speech and Language Pathologist          Therapy Charges for Today     Code Description Service Date Service Provider Modifiers Qty    16250269762 [facility] TREATMENT SPEECH 3 3/2/2020 Alexus Yanes MS CCC-SLP GN 1                     Alexus Yanes MS CCC-SLP  3/2/2020    "

## 2020-03-02 NOTE — PROGRESS NOTES
Occupational Therapy:    Physical Therapy: Individual: 100 minutes.    Speech Language Pathology:    Signed by: Prakash Haque PTA

## 2020-03-02 NOTE — THERAPY TREATMENT NOTE
Inpatient Rehabilitation - Physical Therapy Treatment Note   [place]     Patient Name: Connie Arango  : 1940  MRN: [id number]    Today's Date: 3/2/2020                 Admit Date: 2020      Visit Dx:    No diagnosis found.    Patient Active Problem List   Diagnosis   • ICH (intracerebral hemorrhage) (CMS/HCC)       Therapy Treatment    IRF Treatment Summary     Row Name 20 1511 20 1400          Evaluation/Treatment Time and Intent    Subjective Information  no complaints  -  no complaints  -     Existing Precautions/Restrictions  fall;seizures global aphasia  -RG  fall;seizures aphasia  -     Document Type  therapy note (daily note)  -RG  therapy note (daily note)  -     Mode of Treatment  individual therapy;physical therapy  -RG  --     Patient/Family Observations  Pt and nursing in agreement for skilled PT.   -RG  --     Recorded by [] Prakash Haque PTA [] Marion Browning, OT     Row Name 20 1511             Cognition/Psychosocial- PT/OT    Affect/Mental Status (Cognitive)  -- aphasia  -RG      Follows Commands (Cognition)  verbal cues/prompting required;physical/tactile prompts required;repetition of directions required;25-49% accuracy  -RG      Personal Safety Interventions  gait belt;nonskid shoes/slippers when out of bed;supervised activity  -RG      Cognitive Function (Cognitive)  safety deficit  -RG      Attention Deficit (Cognitive)  requires cues/redirection to task  -RG      Safety Deficit (Cognitive)  safety precautions awareness;judgment;insight into deficits/self awareness;impulsivity;safety precautions follow-through/compliance  -RG      Recorded by [] Prakash Haque PTA      Row Name 20 1511             Bed-Chair Transfer    Bed-Chair [place] (Transfers)  contact guard;verbal cues;nonverbal cues (demo/gesture)  -RG      Assistive Device (Bed-Chair Transfers)  wheelchair  -RG      Recorded by [] Prakash Haque PTA      Row Name  03/02/20 1511             Chair-Bed Transfer    Chair-Bed [place] (Transfers)  contact guard;verbal cues;nonverbal cues (demo/gesture)  -RG      Assistive Device (Chair-Bed Transfers)  wheelchair  -RG      Recorded by [RG] Prakash Haque PTA      Row Name 03/02/20 1511             Sit-Stand Transfer    Sit-Stand [place] (Transfers)  contact guard;verbal cues;nonverbal cues (demo/gesture)  -RG      Assistive Device (Sit-Stand Transfers)  walker, front-wheeled  -RG      Recorded by [RG] Prakash Haque PTA      Row Name 03/02/20 1511             Stand-Sit Transfer    Stand-Sit [place] (Transfers)  contact guard;verbal cues;nonverbal cues (demo/gesture)  -RG      Assistive Device (Stand-Sit Transfers)  walker, front-wheeled  -RG      Recorded by [RG] Prakash Haque PTA      Row Name 03/02/20 1511             Toilet Transfer    [place] Level (Toilet Transfer)  -- x 2 trials  -RG      Recorded by [RG] Prakash Haque PTA      Row Name 03/02/20 1511             Gait/Stairs Assessment/Training    [place] Level (Gait)  contact guard;verbal cues;nonverbal cues (demo/gesture)  -RG      Assistive Device (Gait)  walker, front-wheeled  -RG      Distance in Feet (Gait)  160' x 2 in am and 160' in pm  -RG      Deviations/Abnormal Patterns (Gait)  dmitry decreased;gait speed decreased;stride length decreased  -RG      Bilateral Gait Deviations  forward flexed posture  -RG      Recorded by [RG] Prakash Haque PTA      Row Name 03/02/20 1511             Safety Issues, Functional Mobility    Impairments Affecting Function (Mobility)  balance;cognition;coordination;endurance/activity tolerance;motor control;motor planning;muscle tone abnormal;strength  -RG      Recorded by [RG] Prakash Haque PTA      Row Name 03/02/20 1511             Gross Motor Coordination    Gross Motor Impairments  -- <coordination  -RG      Recorded by [RG] Prakash Haque PTA      Row Name 03/02/20 1511             Pain Scale:  FACES Pre/Post-Treatment    Pain: FACES Scale, Pretreatment  0-->no hurt  -RG      Pain: FACES Scale, Post-Treatment  0-->no hurt  -RG      Recorded by [RG] Prakash Haque PTA      Row Name 03/02/20 1511             Therapeutic Exercise    Therapeutic Exercise  supine, lower extremities  -RG      Recorded by [RG] Prakash Haque PTA      Row Name 03/02/20 1400             Upper Extremity Seated Therapeutic Exercise    Exercise Type, Seated Upper Extremity (Therapeutic Exercise)  AROM (active range of motion) /[facility] functional act tolerance  -      Expected Outcomes, Seated Upper Extremity (Therapeutic Exercise)  improve functional tolerance, self-care activity  -      Recorded by [] Marion Browning OT      Row Name 03/02/20 1511             Lower Extremity Standing Therapeutic Exercise    Performed, Standing Lower Extremity (Therapeutic Exercise)  hip abduction/adduction;heel/toe raises  -RG      Device, Standing Lower Extremity (Therapeutic Exercise)  parallel bars  -RG      Exercise Type, Standing Lower Extremity (Therapeutic Exercise)  AROM (active range of motion)  -RG      Expected Outcomes, Standing Lower Extremity (Therapeutic Exercise)  improve functional tolerance, community activity;improve functional tolerance, household activity;improve functional tolerance, self-care activity;improve performance, gait skills;improve performance, transfer skills  -RG      Sets/Reps Detail, Standing Lower Extremity (Therapeutic Exercise)  20  -RG      Comment, Standing Lower Extremity (Therapeutic Exercise)  Verbal and tactile cues to initiate task and for completion of exercises.   -RG      Recorded by [RG] Prakash Haque PTA      Row Name 03/02/20 1511             Lower Extremity Seated Therapeutic Exercise    Performed, Seated Lower Extremity (Therapeutic Exercise)  hip flexion/extension;knee flexion/extension;LAQ (long arc quad), knee extension;ankle dorsiflexion/plantarflexion;hip abduction/adduction   -RG      Device, Seated Lower Extremity (Therapeutic Exercise)  elastic bands/tubing;small ball;free weights, cuff  -RG      Exercise Type, Seated Lower Extremity (Therapeutic Exercise)  AROM (active range of motion)  -RG      Expected Outcomes, Seated Lower Extremity (Therapeutic Exercise)  improve functional tolerance, community activity;improve functional tolerance, household activity;improve functional tolerance, self-care activity;improve performance, gait skills;improve performance, transfer skills  -RG      Sets/Reps Detail, Seated Lower Extremity (Therapeutic Exercise)  20  -RG      Comment, Seated Lower Extremity (Therapeutic Exercise)  Verbal and tactile cues for completion  -RG      Recorded by [RG] Prakash Haque PTA      Row Name 03/02/20 1511             Lower Extremity Supine Therapeutic Exercise    Performed, Supine Lower Extremity (Therapeutic Exercise)  hip abduction/adduction;SAQ (short arc quad) over bolster;SLR (straight leg raise);ankle pumps;heel slides;hip external/internal rotation  -RG      Device, Supine Lower Extremity (Therapeutic Exercise)  foam roll  -RG      Exercise Type, Supine Lower Extremity (Therapeutic Exercise)  AROM (active range of motion)  -RG      Expected Outcomes, Supine Lower Extremity (Therapeutic Exercise)  improve functional tolerance, community activity;improve functional tolerance, household activity;improve functional tolerance, self-care activity;improve performance, gait skills;improve performance, transfer skills  -RG      Comment, Supine Lower Extremity (Therapeutic Exercise)  cues to stay on task  -RG      Recorded by [RG] Prakash Haque PTA      Row Name 03/02/20 1511             Positioning and Restraints    Pre-Treatment Position  sitting in chair/recliner  -RG      Post Treatment Position  wheelchair  -RG      In Wheelchair  notified nsg;sitting;with SLP  -RG      Recorded by [RG] Prakash Haque PTA        User Key  (r) = Recorded By, (t) = Taken By,  (c) = Cosigned By    Initials Name Effective Dates     Marion Browning, OT 09/16/19 -     Prakash Broussard PTA 10/31/19 -                  PT Recommendation and Plan                        Time Calculation:     PT Charges     Row Name 03/02/20 1525 03/02/20 1524          Time Calculation    Start Time  1330  -RG  0800  -RG     Stop Time  1355  -RG  0915  -RG     Time Calculation (min)  25 min  -RG  75 min  -RG     PT Received On  --  03/02/20  -RG     PT Goal Re-Cert Due Date  --  03/05/20  -RG        Time Calculation- PT    Total Timed Code Minutes- PT  25 minute(s)  -RG  75 minute(s)  -RG       User Key  (r) = Recorded By, (t) = Taken By, (c) = Cosigned By    Initials Name Provider Type    Prakash Broussard PTA Physical Therapy Assistant          Therapy Charges for Today     Code Description Service Date Service Provider Modifiers Qty    66912109853 HC GAIT TRAINING EA 15 MIN 3/2/2020 Prakash Haque PTA GP 2    86294623918 HC PT THERAPEUTIC ACT EA 15 MIN 3/2/2020 Prakash Haque PTA GP 1    24055651774 HC PT THER PROC EA 15 MIN 3/2/2020 Prakash Haque PTA GP 4                   Prakash Haque PTA  3/2/2020

## 2020-03-03 PROCEDURE — 97530 THERAPEUTIC ACTIVITIES: CPT

## 2020-03-03 PROCEDURE — 97535 SELF CARE MNGMENT TRAINING: CPT

## 2020-03-03 PROCEDURE — 97110 THERAPEUTIC EXERCISES: CPT

## 2020-03-03 PROCEDURE — 97116 GAIT TRAINING THERAPY: CPT

## 2020-03-03 PROCEDURE — 92507 TX SP LANG VOICE COMM INDIV: CPT | Performed by: SPEECH-LANGUAGE PATHOLOGIST

## 2020-03-03 RX ADMIN — ASPIRIN 81 MG: 81 TABLET, CHEWABLE ORAL at 09:09

## 2020-03-03 RX ADMIN — POTASSIUM CHLORIDE 10 MEQ: 750 TABLET, FILM COATED, EXTENDED RELEASE ORAL at 09:09

## 2020-03-03 RX ADMIN — CARVEDILOL 12.5 MG: 25 TABLET, FILM COATED ORAL at 18:07

## 2020-03-03 RX ADMIN — IMIPRAMINE HYDROCHLORIDE 10 MG: 10 TABLET ORAL at 20:02

## 2020-03-03 RX ADMIN — FAMOTIDINE 40 MG: 20 TABLET, FILM COATED ORAL at 09:09

## 2020-03-03 RX ADMIN — ATORVASTATIN CALCIUM 40 MG: 40 TABLET, FILM COATED ORAL at 20:02

## 2020-03-03 RX ADMIN — CARVEDILOL 12.5 MG: 25 TABLET, FILM COATED ORAL at 09:08

## 2020-03-03 RX ADMIN — MAGNESIUM GLUCONATE 500 MG ORAL TABLET 400 MG: 500 TABLET ORAL at 13:34

## 2020-03-03 RX ADMIN — FUROSEMIDE 20 MG: 20 TABLET ORAL at 09:08

## 2020-03-03 RX ADMIN — POLYETHYLENE GLYCOL (3350) 17 G: 17 POWDER, FOR SOLUTION ORAL at 13:36

## 2020-03-03 NOTE — PROGRESS NOTES
Occupational Therapy: Individual: 70 minutes.    Physical Therapy:    Speech Language Pathology:    Signed by: Daylin Rebolledo, Occupational Therapist

## 2020-03-03 NOTE — PROGRESS NOTES
Case Management  Inpatient Rehabilitation Team Conference    Conference Date/Time: 3/3/2020 8:53:33 AM    Team Conference Attendees:  MD Erendira Ferguson SW Jessica Bill, RN,   MALCOLM Peterson, PT  Daylin Rebolledo, OT  Alexus Yaens, SLP    Demographics            Age: 79Y            Gender: Female    Admission Date: 2/26/2020 4:32:00 PM  Rehabilitation Diagnosis:  multifocal CVAs/left ICH  Comorbidities:      Plan of Care  Anticipated Discharge Date/Estimated Length of Stay: 10-14 days  Anticipated Discharge Destination: Community discharge with assistance  Discharge Plan : Pt plans to return home with son assisting with care if he is  able to meet caregiving needs at discharge.  Medical Necessity Expected Level Rationale: fair-good  Intensity and Duration: an average of 3 hours/5 days per week  Medical Supervision and 24 Hour Rehab Nursing: x  Physical Therapy: x  PT Intensity/Duration: PT 1-1.5 hours per day/5 days per week  Occupational Therapy: x  OT Intensity/Duration: OT 1-1.5 hours per day/5 days per week  Speech and Language Therapy: x  SLP Intensity/Duration: SLP 30 mins-90 mins per day/5 days per week  Social Work: x  Therapeutic Recreation: x  Updated (if changes indicated)    Anticipated Discharge Date/Estimated Length of Stay:   3-12-20      Discharge Plan of Care:    Based on the patient's medical and functional status, their prognosis and  expected level of functional improvement is: fair      Interdisciplinary Problem/Goals/Status  Communication    [ST] Expression(Active)  Current Status(02/29/2020): Pt presents w/ moderately severe-severe expressive  deficits.  Weekly Goal(03/02/2020): Pt will verbally produce biographical info 3/5 opp.  Discharge Goal: Pt will improve expressive language skills to allow for maximal  communication and safety in adls.    [ST] Comprehension(Active)  Current Status(02/29/2020): Pt presents w/ moderately severe-severe  receptive  deficits.  Weekly Goal(03/02/2020): Pt will identify common tangible objects from fo2.  Discharge Goal: Pt will improve receptive language skills to allow for maximal  communication and safety in adls.        Mobility    [PT] Bed/Chair/Wheelchair(Active)  Current Status(03/02/2020): CGA  Weekly Goal(03/09/2020): Sup  Discharge Goal: Sup    [PT] Walk(Active)  Current Status(03/02/2020): amb 160' RW CGA  Weekly Goal(03/09/2020): amb 150' RW CGA  Discharge Goal: amb 300' RW Sup        Pain    [RN] Pain Management(Active)  Current Status(02/26/2020): Potential for pain  Weekly Goal(03/04/2020): No pain this week  Discharge Goal: no pain        Safety    [RN] Potential for Injury(Active)  Current Status(02/26/2020): At risk for injury  Weekly Goal(03/04/2020): No injury this week  Discharge Goal: No injury        Self Care    [OT] Dressing (Lower)(Active)  Current Status(03/02/2020): MaxA  Weekly Goal(03/10/2020): ModA  Discharge Goal: Sb    [OT] Toileting(Active)  Current Status(03/02/2020): TotalA  Weekly Goal(03/10/2020): MaxA  Discharge Goal: ModA    Comments: Pt plans to return home with son assisting with caregiving needs.    Signed by: EVELYNE Berger    Physician CoSigned By: Sean Coppola 03/03/2020 13:03:05

## 2020-03-03 NOTE — THERAPY TREATMENT NOTE
Inpatient Rehabilitation - Physical Therapy Treatment Note  [facility]     Patient Name: Connie Arango  : 1940  MRN: [id number]    Today's Date: 3/3/2020                 Admit Date: 2020      Visit Dx:    No diagnosis found.    Patient Active Problem List   Diagnosis   • ICH (intracerebral hemorrhage) (CMS/HCC)       Therapy Treatment    IRF Treatment Summary     Row Name 20 1513 20 1508          Evaluation/Treatment Time and Intent    Subjective Information  complains of;fatigue  -RG  complains of;fatigue agreeable to therapy  -     Existing Precautions/Restrictions  fall;seizures global aphasia  -RG  fall;seizures  -     Document Type  therapy note (daily note)  -RG  therapy note (daily note)  -     Mode of Treatment  individual therapy;physical therapy  -RG  occupational therapy  -     Patient/Family Observations  Pt and nurisng in agreement for skilled PT.  -RG  --     Recorded by [RG] Prakash Haque PTA [CJ] Daylin Rebolledo OT     Row Name 20 1513 20 1508          Cognition/Psychosocial- PT/OT    Affect/Mental Status (Cognitive)  -- aphasia  -RG  --     Follows Commands (Cognition)  verbal cues/prompting required;physical/tactile prompts required;repetition of directions required;25-49% accuracy  -RG  verbal cues/prompting required;physical/tactile prompts required;repetition of directions required  -     Personal Safety Interventions  gait belt;supervised activity;nonskid shoes/slippers when out of bed  -RG  --     Cognitive Function (Cognitive)  safety deficit  -RG  safety deficit  -CJ     Attention Deficit (Cognitive)  requires cues/redirection to task  -RG  requires cues/redirection to task  -     Safety Deficit (Cognitive)  safety precautions awareness;safety precautions follow-through/compliance;insight into deficits/self awareness;awareness of need for assistance  -RG  safety precautions awareness;awareness of need for assistance;impulsivity  -CJ      Recorded by [RG] Prakash Haque PTA [CJ] Daylin Rebolledo, OT     Row Name 03/03/20 1508             Communication Assessment/Intervention    Additional Documentation  -- aphasia  -CJ      Recorded by [CJ] Daylin Rebolledo, OT      Row Name 03/03/20 1513             Bed-Chair Transfer    Bed-Chair [place] (Transfers)  contact guard;verbal cues;nonverbal cues (demo/gesture)  -RG      Assistive Device (Bed-Chair Transfers)  wheelchair  -RG      Recorded by [RG] Prakash Haque PTA      Row Name 03/03/20 1513 03/03/20 1508          Chair-Bed Transfer    Chair-Bed [place] (Transfers)  contact guard;verbal cues;nonverbal cues (demo/gesture)  -RG  contact guard;verbal cues  -CJ     Assistive Device (Chair-Bed Transfers)  wheelchair  -RG  wheelchair  -CJ     Recorded by [RG] Prakash Haque PTA [CJ] Daylin Rebolledo, OT     Row Name 03/03/20 1513             Sit-Stand Transfer    Sit-Stand [place] (Transfers)  contact guard;verbal cues;nonverbal cues (demo/gesture)  -RG      Assistive Device (Sit-Stand Transfers)  walker, front-wheeled  -RG      Recorded by [RG] Prakash Haque PTA      Row Name 03/03/20 1513             Stand-Sit Transfer    Stand-Sit [place] (Transfers)  contact guard;verbal cues;nonverbal cues (demo/gesture)  -RG      Assistive Device (Stand-Sit Transfers)  walker, front-wheeled  -RG      Recorded by [RG] Prakash Haque PTA      Row Name 03/03/20 1513             Toilet Transfer    [place] Level (Toilet Transfer)  -- x 2 trials  -RG      Recorded by [RG] Prakash Haque PTA      Row Name 03/03/20 1513             Gait/Stairs Assessment/Training    [place] Level (Gait)  contact guard;verbal cues;nonverbal cues (demo/gesture)  -RG      Assistive Device (Gait)  walker, front-wheeled  -RG      Distance in Feet (Gait)  320' in am and 320' in pm  -RG      Deviations/Abnormal Patterns (Gait)  dmitry decreased;gait speed decreased;stride length decreased  -RG      Bilateral Gait  Deviations  forward flexed posture  -RG      Recorded by [RG] Prakash Haque PTA      Row Name 03/03/20 1513             Safety Issues, Functional Mobility    Impairments Affecting Function (Mobility)  balance;cognition;coordination;endurance/activity tolerance;motor control;motor planning;muscle tone abnormal;strength  -RG      Recorded by [RG] Prakash Haque PTA      Row Name 03/03/20 1508             Grooming Assessment/Treatment    Grooming [place] Level  minimum assist (75% patient effort);verbal cues  -CJ      Recorded by [CJ] Daylin Rebolledo, OT      Row Name 03/03/20 1513             Gross Motor Coordination    Gross Motor Impairments  -- <coordination  -RG      Recorded by [RG] Prakash Haque, JD      Row Name 03/03/20 1513             Pain Scale: FACES Pre/Post-Treatment    Pain: FACES Scale, Pretreatment  0-->no hurt  -RG      Pain: FACES Scale, Post-Treatment  0-->no hurt  -RG      Recorded by [RG] Prakash Haque, JD      Row Name 03/03/20 1508             Upper Extremity Seated Therapeutic Exercise    Exercise Type, Seated Upper Extremity (Therapeutic Exercise)  AROM (active range of motion) BUE ther ex/act, bilat coord ex, GMC/FMC, hand exerciser  -CJ      Expected Outcomes, Seated Upper Extremity (Therapeutic Exercise)  improve functional tolerance, self-care activity;improve performance, BADLs;improve performance, transfer skills;improve performance, gross motor coordination skills;improve performance, fine motor coordination skills  -CJ      Recorded by [CJ] Daylin Rebolledo, OT      Row Name 03/03/20 1513             Lower Extremity Standing Therapeutic Exercise    Performed, Standing Lower Extremity (Therapeutic Exercise)  hip abduction/adduction;hip flexion/extension;mini-squats;heel raises  -RG      Device, Standing Lower Extremity (Therapeutic Exercise)  parallel bars  -RG      Exercise Type, Standing Lower Extremity (Therapeutic Exercise)  AROM (active range of motion)  -RG       Expected Outcomes, Standing Lower Extremity (Therapeutic Exercise)  improve functional tolerance, community activity;improve functional tolerance, household activity;improve functional tolerance, self-care activity;improve performance, gait skills;improve performance, transfer skills  -RG      Sets/Reps Detail, Standing Lower Extremity (Therapeutic Exercise)  20  -RG      Comment, Standing Lower Extremity (Therapeutic Exercise)  Verbal and tactile cues to stay on task.   -RG      Recorded by [RG] Prakash Haque PTA      Row Name 03/03/20 1513             Lower Extremity Seated Therapeutic Exercise    Performed, Seated Lower Extremity (Therapeutic Exercise)  hip flexion/extension;hip abduction/adduction;knee flexion/extension;LAQ (long arc quad), knee extension;ankle dorsiflexion/plantarflexion  -RG      Device, Seated Lower Extremity (Therapeutic Exercise)  elastic bands/tubing;small ball;free weights, cuff  -RG      Exercise Type, Seated Lower Extremity (Therapeutic Exercise)  AROM (active range of motion)  -RG      Sets/Reps Detail, Seated Lower Extremity (Therapeutic Exercise)  20  -RG      Comment, Seated Lower Extremity (Therapeutic Exercise)  Cues to stay on task.   -RG      Recorded by [RG] Prakash Haque PTA      Row Name 03/03/20 1513             Lower Extremity Supine Therapeutic Exercise    Performed, Supine Lower Extremity (Therapeutic Exercise)  hip abduction/adduction;SAQ (short arc quad) over bolster;heel slides;ankle pumps;SLR (straight leg raise)  -RG      Recorded by [RG] Prakash Haque PTA      Row Name 03/03/20 1513 03/03/20 1508          Positioning and Restraints    Pre-Treatment Position  sitting in chair/recliner  -RG  --     Post Treatment Position  bed  -RG  bed  -CJ     In Bed  notified nsg;supine;call light within reach;encouraged to call for assist;legs elevated  -RG  call light within reach;encouraged to call for assist;exit alarm on;heels elevated;supine;notified nsg  -CJ      Recorded by [RG] Prakash Haque PTA [CJ] Daylin Rebolledo, OT       User Key  (r) = Recorded By, (t) = Taken By, (c) = Cosigned By    Initials Name Effective Dates     Daylin Rebolledo, OT 04/03/18 -     Prakash Broussard PTA 10/31/19 -                  PT Recommendation and Plan                        Time Calculation:     PT Charges     Row Name 03/03/20 1521 03/03/20 1520          Time Calculation    Start Time  1400  -RG  0800  -RG     Stop Time  1425  -RG  0915  -RG     Time Calculation (min)  25 min  -RG  75 min  -RG     PT Received On  --  03/03/20  -RG     PT Goal Re-Cert Due Date  --  03/05/20  -RG        Time Calculation- PT    Total Timed Code Minutes- PT  25 minute(s)  -RG  75 minute(s)  -RG       User Key  (r) = Recorded By, (t) = Taken By, (c) = Cosigned By    Initials Name Provider Type    Prakash Broussard PTA Physical Therapy Assistant          Therapy Charges for Today     Code Description Service Date Service Provider Modifiers Qty    51030651712 HC GAIT TRAINING EA 15 MIN 3/2/2020 Prakash Haque PTA GP 2    60614771475 HC PT THERAPEUTIC ACT EA 15 MIN 3/2/2020 Prakash Haque, JD GP 1    81425914496 HC PT THER PROC EA 15 MIN 3/2/2020 Prakash Haque PTA GP 4    25055788985 HC GAIT TRAINING EA 15 MIN 3/3/2020 Prakash Haque PTA GP 2    43232229065 HC PT THERAPEUTIC ACT EA 15 MIN 3/3/2020 Prakash Haque PTA GP 2    30437119095 HC PT THER PROC EA 15 MIN 3/3/2020 Prakash Haque PTA GP 3                   Prakash Haque PTA  3/3/2020

## 2020-03-03 NOTE — PROGRESS NOTES
[facility] Coordinator  Inpatient Rehabilitation Admission    Ethnic Group:  Marital Status:    IRF Admission Date:  02/26/2020  Admission Class:  Admit From:    Pre-Hospital Living:    Payment Sources:  Impairment Group:  Date of Onset of Impairment:    Etiologic Diagnosis Code(s):  Rank Code      Description  1    I63.9     Cerebral infarction, unspecified    Comorbidities:  Rank Code      Description    1    J96.01    Acute respiratory failure with hypoxia  2    G93.40    Encephalopathy, unspecified  3    J90       Pleural effusion, not elsewhere classified  4    I95.9     Hypotension, unspecified  5    I10       Essential (primary) hypertension  6    E78.5     Hyperlipidemia, unspecified  7    I25.10    Atherosclerotic heart disease of native                 coronary artery without angina pectoris  8    K21.9     Gastro-esophageal reflux disease without                 esophagitis  9    R53.81    Other malaise  10   R47.01    Aphasia  11   R13.10    Dysphagia, unspecified    Height on Admission:  Weight on Admission:    Are there any arthritis conditions recorded for Impairment Group, Etiologic  Diagnosis, or Comorbid Conditions that meet all of the regulatory requirements  for IRF classification (in 42 .29(b)(2)(x), (xi), and xii))?  No    RKISTEN Bladder Accidents:  0 - Accidents.  Patient used medications/device 4 days  prior to admission.  Patient used medications/device this shift.  2/26/2020  4:32:00 PM  Bladder Score = 6. Patient has not had an accident, but uses a  device/medication.  KRISTEN Bowel Accident: 0 -Accidents.  Patient used medications/device 4 days prior  to admission. Patient used medications/device this shift.  2/26/2020 4:32:00 PM  Bowel Score = 6. Patient has no accidents, but uses a device/medications.    Signed by: Rosa Lyle, Supervisor

## 2020-03-03 NOTE — THERAPY TREATMENT NOTE
"Inpatient Rehabilitation - Speech Language Pathology Treatment Note  [facility]     Patient Name: Connie Arango  : 1940  MRN: [id number]  Today's Date: 3/3/2020         Admit Date: 2020    Visit Dx:    No diagnosis found.  Patient Active Problem List   Diagnosis   • ICH (intracerebral hemorrhage) (CMS/[facility])        Therapy Treatment     Ms. Arango is seen this am in speech therapy office for formal s/l therapy to address deficits. She is positioned upright and centered in wheelchair w/ gait belt attached. She is a/a cooperative to participate. She is evidenced w/ some frustration during today's tx session / pt awareness of her deficits.    Long Term Goal:  1.Pt will improve receptive language skills to allow for maximal communication and safety in adls.   2.Pt will improve expressive language skills to allow for maximal communication and safety in adls.      Short Term Goals:  1. Pt will receptively identify common tangible objects from field of 2 w/ 50% acc and mod cues over 3 consecutive sessions.   *pt is able to identify \"spoon and cup\" from FO2 given max cues w/ 60% acc. Pt unable to receptively identify any other common tangible objects. Pt is able to match letters A-z w/ 90% acc given mod cues.  2. Pt will receptively demonstrate object fnx w/ 60% acc and mod cues over 3 consecutive sessions.   *pt able to receptively demonstrate object fnx of \"spoon, cup, knife, mug, sock, and hammer\" w/ 60% acc given mod-max cues to initiate fnx of objects. Significant perseveration during this task.  3. Pt will id body parts w/ 40% acc and mod cues over 3 consecutive sessions.   *not directly addressed this session.  4. Pt will follow simple one step directives 3/5 opp w/ mod-max cues over 3 consecutive sessions.   *pt able to follow simple one step directives related to table top tasks w/ 55% acc given mod-max cues. Pt often requires multiple re-directions during tasks to sustain attention.  5. Pt will " "verbally produce biographical information 3/5 opp w/ mod-max cues over 3 consecutive session.   *not directly addressed this session.  6. Pt will verbally respond to simple y/n questions w/ 75% acc and mod cues over 3 consecutive sessions.   *pt verbally responds to simple y/n questions w/ 65% acc related to adls, table top tasks. Pt often perseverates during this task.  7. Pt will confrontation name common tangible objects 2/5 opp w/ mod-max cues over 3 consecutive sessions.   *pt able to name \"spoon, knife, mug, ball, sock, cup\" after max cues and models from SLP.  8. Pt will perform rote speech tasks 3/5 opp w/ mod cues over 3 consecutive sessions.   *pt able to name letters A-Z given mod-max cues w/ visual stimuli on table top. Perseveration noted w/ letters \"w, x, y, z\"; pt able to verbalize DEWEY and RASHAAD after visual table top cues and max modeling from SLP. Improvement in spontaneous speech this session as well as ability to participate in tasks.    *Additional goals to be added/modified as necessary.      Thank you-  Alexus Yanes M.S., CCC-SLP      EDUCATION  The patient has been educated in the following areas:   Cognitive Impairment Communication Impairment.    SLP Recommendation and Plan    Continue tx per poc.  Improvement in spontaneous speech this session as well as ability to participate in tasks.    Time Calculation:     Time Calculation- SLP     Time Calculation- SLP     Row Name 03/03/20 1533    SLP Start Time  0915  -Recorded by: AMARIS    SLP Stop Time  1000  -Recorded by: AMARIS    SLP Time Calculation (min)  45 min  -Recorded by: AMARIS    SLP Non-Billable Time (min)  15 min staffing  -Recorded by: AMARIS ROCK - Next Appointment  03/04/20  -Recorded by: AMARIS            User Key     Initials Name Provider Type    Alexus Lamar MS CCC-SLP Speech and Language Pathologist          Therapy Charges for Today     Code Description Service Date Service Provider Modifiers Qty    35801258107  ST TREATMENT " SPEECH 3 3/2/2020 Alexus Yanes, MS CCC-SLP GN 1    [id number]  ST TREATMENT SPEECH 3 3/3/2020 Alexus Yanes, MS CCC-SLP GN 1                     Alexus Yanes, MS ELKINS-SLP  3/3/2020

## 2020-03-03 NOTE — DISCHARGE INSTR - APPOINTMENTS
MS. IVERSON HAS AN APPOINTMENT TO SEE DR. FRANCO ON: 03- 20 @ 10:00  [phone]    MS. IVERSON HAS AN APPOINTMENT TO SEE DR. FISHER ON: 06- 23 @ 12:00  [phone]

## 2020-03-03 NOTE — PROGRESS NOTES
PROGRESS NOTE         Patient Identification:  Name:  Connie Arango  Age:  79 y.o.  Sex:  female  :  1940  MRN:  [id number]  Visit Number:  [id number]  Primary Care Provider:  Luis Enrique Prieto MD         LOS: 6 days       ----------------------------------------------------------------------------------------------------------------------  Subjective       Chief Complaints:    Intracranial hemorrhage   Encephalopathy   debility      Interval History:      Overall patient seems to be very stable with no issues reported overnight.  No fever and blood pressure very well controlled.  Patient denies any chest pain shortness of breath nausea vomiting or diarrhea and unfortunately gets slightly confused at night but she seems to be very stable this morning.    Patient participated with speech therapy, occupational therapy, and physical therapy on 3/2/20. Bed-chair transfer is at contact guard with verbal and nonverbal cues using a wheelchair. Chair-bed transfer is at contact guard with verbal and nonverbal cues using a wheelchair. Sit-stand transfer is at contact guard with verbal and nonverbal cues using a front-wheeled walker. Stand-sit transfer is at contact guard with verbal and nonverbal cues using a front-wheeled walker. Patient ambulated 160 feet x2 in the AM and 160 feet in the PM.       Review of Systems:    Constitutional: no fever, chills and night sweats.  Seems to be more alert and less confused.  Eyes: no eye drainage, itching or redness.  HEENT: no mouth sores, dysphagia or nose bleed.  Respiratory: no for shortness of breath, cough or production of sputum.  Cardiovascular: no chest pain, no palpitations, no orthopnea.  Gastrointestinal: no nausea, vomiting or diarrhea. No abdominal pain, hematemesis or rectal bleeding.  Genitourinary: no dysuria or polyuria.  Hematologic/lymphatic: no lymph node abnormalities, no easy bruising or easy bleeding.  Musculoskeletal: no muscle or joint  pain.  Skin: No rash and no itching.  Neurological: no loss of consciousness, no seizure, no headache.  Behavioral/Psych: no depression or suicidal ideation.  Endocrine: no hot flashes.  Immunologic: negative.  ----------------------------------------------------------------------------------------------------------------------      Objective       [facility] Meds:    aspirin 81 mg Oral Daily   atorvastatin 40 mg Oral Nightly   carvedilol 12.5 mg Oral BID With Meals   famotidine 40 mg Oral Daily   furosemide 20 mg Oral Daily   imipramine 10 mg Oral Nightly   magnesium oxide 400 mg Oral Daily   potassium chloride 10 mEq Oral Daily        ----------------------------------------------------------------------------------------------------------------------    Vital Signs:  Temp:  [98.2 °F (36.8 °C)-98.3 °F (36.8 °C)] 98.3 °F (36.8 °C)  Heart Rate:  [58-79] 79  Resp:  [20] 20  BP: (111-133)/(54-69) 111/54  No data found.  SpO2 Percentage    03/01/20 1906 03/02/20 0715 03/02/20 1907   SpO2: 92% 93% 93%     SpO2:  [93 %] 93 %  on   ;   Device (Oxygen Therapy): room air    Body mass index is 26.45 kg/m².  Wt Readings from Last 3 Encounters:   02/26/20 72 kg (158 lb 11.7 oz)        Intake/Output Summary (Last 24 hours) at 3/3/2020 0704  Last data filed at 3/3/2020 0626  Gross per 24 hour   Intake 1200 ml   Output --   Net 1200 ml     Diet Pureed; Thin  ----------------------------------------------------------------------------------------------------------------------      Physical Exam:     General Appearance: alert and pleasant.  No acute distress.  Very pleasant and answering questions appropriately.    Head: normocephalic, without obvious abnormality and atraumatic     Eyes: lids and lashes normal, conjunctivae and sclerae normal, no icterus, no pallor, corneas clear and PERRLA     Ears: ears appear intact with no abnormalities noted     Nose: nares normal, septum midline, mucosa normal and no drainage                 Throat: no oral lesions, no thrush, oral mucosa moist and oopharynx normal     Neck: no adenopathy, supple, trachea midline, no thyromegaly, no carotid bruit and no JVD     Back: no kyphosis present, no scoliosis present, no skin lesions, erythema, or scars, no tenderness to percussion or palpation and range of motion normal     Lungs: clear to auscultation, respirations regular, respirations even and  respirations unlabored. No accessory muscle use.      Heart:: regular rhythm & normal rate, normal S1, S2, no murmur, no gallop, no rub and no click.  Chest wall with no abnormalities observed. PMI nondisplaced     Abdomen: normal bowel sounds in all quadrants, no masses, no hepatomegaly, no splenomegaly, soft non-tender, no guarding and no rebound tenderness     Extremities: no edema, no cyanosis, no redness, no tenderness, no clubbing     Musculoskeletal: joints with no effusion nor erythema nor warmth.  Pedal pulses palpable and equal bilaterally     Skin: no bleeding, bruising or rash and no lesions noted     Lymph Nodes: no palpable adenopathy     Neurologic: Alert and awake.  Mild aphasia.              Sensory intact in BLE and BUE.              Motor strength Generalized weakness  ----------------------------------------------------------------------------------------------------------------------            Results from last 7 days   Lab Units 02/27/20  0657   WBC 10*3/mm3 6.26   HEMOGLOBIN g/dL 12.5   HEMATOCRIT % 40.2   MCV fL 89.3   MCHC g/dL 31.1*   PLATELETS 10*3/mm3 382     Results from last 7 days   Lab Units 02/27/20  0657   SODIUM mmol/L 140   POTASSIUM mmol/L 4.2   MAGNESIUM mg/dL 2.2   CHLORIDE mmol/L 104   CO2 mmol/L 24.5   BUN mg/dL 22   CREATININE mg/dL 0.85   EGFR IF NONAFRICN AM mL/min/1.73 64   CALCIUM mg/dL 9.8   GLUCOSE mg/dL 97   ALBUMIN g/dL 3.23*   BILIRUBIN mg/dL 0.6   ALK PHOS U/L 92   AST (SGOT) U/L 30   ALT (SGPT) U/L 32   Estimated Creatinine Clearance: 52.4 mL/min (by C-G  formula based on SCr of 0.85 mg/dL).  No results found for: AMMONIA    No results found for: HGBA1C, POCGLU  No results found for: HGBA1C  No results found for: TSH, FREET4    No results found for: BLOODCX  No results found for: URINECX  No results found for: WOUNDCX  No results found for: STOOLCX  No results found for: RESPCX  Pain Management Panel     There is no flowsheet data to display.            ----------------------------------------------------------------------------------------------------------------------  Imaging Results (Last 24 Hours)     ** No results found for the last 24 hours. **          ----------------------------------------------------------------------------------------------------------------------    Assessment/Plan       Assessment/Plan     ASSESSMENT:    Multifocal CVA/left parietotemporal ICH   Small acute corical ischemic infarcts in the left parietal Lobe and right parieto-occipital region  Left parietotemporal hemorrhage  Encephalopathy  Acute hypoxia respiratory failure  HTN  Hypotension  Small bilateral pleural effusions  Dysphagia  Global aphasia     PLAN:    Overall patient seems to be very stable with no issues reported overnight.  No fever and blood pressure very well controlled.  Patient denies any chest pain shortness of breath nausea vomiting or diarrhea and unfortunately gets slightly confused at night but she seems to be very stable this morning.    Patient participated with speech therapy, occupational therapy, and physical therapy on 3/2/20. Bed-chair transfer is at contact guard with verbal and nonverbal cues using a wheelchair. Chair-bed transfer is at contact guard with verbal and nonverbal cues using a wheelchair. Sit-stand transfer is at contact guard with verbal and nonverbal cues using a front-wheeled walker. Stand-sit transfer is at contact guard with verbal and nonverbal cues using a front-wheeled walker. Patient ambulated 160 feet x2 in the AM and 160 feet in  the PM.     Patient continues to require intensive inpatient physical therapy for therapeutic exercises, range of motion, endurance, gait training, safety, stairs training, strengthening for at least 1 to 2 hours a day for 5 to 6 days a week as well as occupational therapy for dressing, ADLs, feeding, home skills, safety and toileting techniques for 1 to 2 hours a day for 5 to 6 days a week, as well as speech and language pathology therapy for communication, expression, dysphasia, aspiration needs for 30 to 90 minutes a day for 5 to 6 days a week.        Code Status:   Code Status and Medical Interventions:   Ordered at: 02/26/20 1948     Code Status:    CPR     Medical Interventions (Level of Support Prior to Arrest):    Full           Sean Coppola MD  03/03/20  7:03 AM

## 2020-03-03 NOTE — SIGNIFICANT NOTE
03/03/20 4416   Plan   Plan SS received call from pt's sister Ismael Nevarez who voiced concerns about pt going home with son being primary caregiver.  Sister unsure if other family members will be able to assist son with pt's care at home.  Informed sister discharge is planned for 3-12-20 and how pt is doing in therapy.  Explained pt will require 24 hour supervision/assistance at discharge.  Discussed other options for continued care and rehab if son is unable to provide care.  Sister will call pt's son and talk to him about concerns and options.  Family to inform SS if discharge plans change.

## 2020-03-03 NOTE — PLAN OF CARE
Pt actively participated in formal language tx this am. Continues to make progress towards goals w/ improvement in spontaneous speech. Continue tx per poc. Continue modified po diet.  Problem: Patient Care Overview  Goal: Plan of Care Review  Outcome: Ongoing (interventions implemented as appropriate)     Problem: Communication Impairment (IRF) (Adult)  Goal: Optimal/Safe Level of Communication [place]  Outcome: Ongoing (interventions implemented as appropriate)  Flowsheets (Taken 2/29/2020 1409 by Patience Yanes MA,CCC-SLP)  Optimal/Safe Level of Communication [place]: demonstrating adequate progress  Goal: Optimal Quality of Life in Relation to Communication  Outcome: Ongoing (interventions implemented as appropriate)  Flowsheets (Taken 2/29/2020 1409 by Patience Yanes MA,CCC-SLP)  Optimal Quality of Life in Relation to Communication: demonstrating adequate progress

## 2020-03-03 NOTE — SIGNIFICANT NOTE
03/03/20 0163   Plan   Plan Team conference held today.  Spoke to pt and son Dilip [phone] about plans for discharge on 3-12-20 if pt progresses in therapy, has medical necessity for continued rehab stay and approved by insurance.  Discussed with son how pt is doing in therapy and need for 24 hour supervision/assistance when she is discharged due to cognition.  Son says he will talk to other family members about assisting and may hire a caregiver to stay with pt when has to go to his home and take care of things.  Invited son to observe pt in therapy and receive teaching prior to pt going home.  Son does not drive and will inform SS if he can get someone to transport him.  Encouraged him to be present for discharge instructions if he is unable to come to rehab for observation/teaching prior.  Pt plans to return home with son and family assisting with caregiving needs.   Patient/Family in Agreement with Plan yes

## 2020-03-03 NOTE — THERAPY TREATMENT NOTE
Inpatient Rehabilitation - Occupational Therapy Treatment Note     Dominic     Patient Name: Connie Arango  : 1940  MRN: [id number]    Today's Date: 3/3/2020                 Admit Date: 2020      Visit Dx:  No diagnosis found.    Patient Active Problem List   Diagnosis   • ICH (intracerebral hemorrhage) (CMS/HCC)         Therapy Treatment    IRF Treatment Summary     Row Name 20 1508             Evaluation/Treatment Time and Intent    Subjective Information  complains of;fatigue agreeable to therapy  -CJ      Existing Precautions/Restrictions  fall;seizures  -CJ      Document Type  therapy note (daily note)  -CJ      Mode of Treatment  occupational therapy  -CJ      Recorded by [CJ] Daylin Rebolledo OT      Row Name 20 1508             Cognition/Psychosocial- PT/OT    Follows Commands (Cognition)  verbal cues/prompting required;physical/tactile prompts required;repetition of directions required  -CJ      Cognitive Function (Cognitive)  safety deficit  -CJ      Attention Deficit (Cognitive)  requires cues/redirection to task  -CJ      Safety Deficit (Cognitive)  safety precautions awareness;awareness of need for assistance;impulsivity  -CJ      Recorded by [CJ] Daylin Rebolledo OT      Row Name 20 1508             Communication Assessment/Intervention    Additional Documentation  -- aphasia  -CJ      Recorded by [CJ] Daylin Rebolledo OT      Row Name 20 1508             Chair-Bed Transfer    Chair-Bed [place] (Transfers)  contact guard;verbal cues  -CJ      Assistive Device (Chair-Bed Transfers)  wheelchair  -CJ      Recorded by [CJ] Daylin Rebolledo OT      Row Name 20 1508             Grooming Assessment/Treatment    Grooming [place] Level  minimum assist (75% patient effort);verbal cues  -CJ      Recorded by [CJ] Daylin Rebolledo OT      Row Name 20 1508             Upper Extremity Seated Therapeutic Exercise    Exercise Type, Seated Upper Extremity  (Therapeutic Exercise)  AROM (active range of motion) BUE ther ex/act, bilat coord ex, GMC/FMC, hand exerciser  -CJ      Expected Outcomes, Seated Upper Extremity (Therapeutic Exercise)  improve functional tolerance, self-care activity;improve performance, BADLs;improve performance, transfer skills;improve performance, gross motor coordination skills;improve performance, fine motor coordination skills  -CJ      Recorded by [CJ] Daylin Rebolledo OT      Row Name 03/03/20 1509             Positioning and Restraints    Post Treatment Position  bed  -CJ      In Bed  call light within reach;encouraged to call for assist;exit alarm on;heels elevated;supine;notified nsg  -CJ      Recorded by [CJ] Daylin Rebolledo OT        User Key  (r) = Recorded By, (t) = Taken By, (c) = Cosigned By    Initials Name Effective Dates    CJ Daylin Rebolledo OT 04/03/18 -                  OT Recommendation and Plan    Anticipated Equipment Needs At Discharge (OT Eval): (TBD)  Planned Therapy Interventions (OT Eval): activity tolerance training, BADL retraining, neuromuscular control/coordination retraining, occupation/activity based interventions, patient/caregiver education/training, ROM/therapeutic exercise, strengthening exercise, transfer/mobility retraining            OT IRF GOALS     Row Name 02/27/20 1523             Bathing Goal 1 (OT-IRF)    [place] Level (Bathing Goal 1, OT-IRF)  moderate assist (50-74% patient effort)  -      Time Frame (Bathing Goal 1, OT-IRF)  long term goal (LTG);by discharge  -         LB Dressing Goal 1 (OT-IRF)    [place] (LB Dressing Goal 1, OT-IRF)  moderate assist (50-74% patient effort)  -      Time Frame (LB Dressing Goal 1, OT-IRF)  short term goal (STG)  -         LB Dressing Goal 2 (OT-IRF)    [place] (LB Dressing Goal 2, OT-IRF)  minimum assist (75% or more patient effort)  -      Time Frame (LB Dressing Goal 2, OT-IRF)  long term goal (LTG);by discharge  -          Toileting Goal 1 (OT-IRF)    [place] Level (Toileting Goal 1, OT-IRF)  maximum assist (25-49% patient effort)  -      Time Frame (Toileting Goal 1, OT-IRF)  short term goal (STG)  -         Toileting Goal 2 (OT-IRF)    [place] Level (Toileting Goal 2, OT-IRF)  moderate assist (50-74% patient effort)  -      Time Frame (Toileting Goal 2, OT-IRF)  long term goal (LTG);by discharge  -        User Key  (r) = Recorded By, (t) = Taken By, (c) = Cosigned By    Initials Name Provider Type     Daylin Rebolledo OT Occupational Therapist                     Time Calculation:     Time Calculation- OT     Row Name 03/03/20 1513             Time Calculation- OT    OT Start Time  1045  -      OT Stop Time  1155  -      OT Time Calculation (min)  70 min  -      Total Timed Code Minutes- OT  70 minute(s)  -      OT Non-Billable Time (min)  15 min  -        User Key  (r) = Recorded By, (t) = Taken By, (c) = Cosigned By    Initials Name Provider Type     Daylin Rebolledo OT Occupational Therapist          Therapy Charges for Today     Code Description Service Date Service Provider Modifiers Qty    [id number]  OT SELF CARE/MGMT/TRAIN EA 15 MIN 3/3/2020 Daylin Rebolledo OT GO 1    [id number]  OT THERAPEUTIC ACT EA 15 MIN 3/3/2020 Daylin Rebolledo OT GO 3    [id number]  OT THER PROC EA 15 MIN 3/3/2020 Daylin Rebolledo OT GO 1                   Daylin Rebolledo OT  3/3/2020

## 2020-03-04 PROCEDURE — 92507 TX SP LANG VOICE COMM INDIV: CPT | Performed by: SPEECH-LANGUAGE PATHOLOGIST

## 2020-03-04 PROCEDURE — 97112 NEUROMUSCULAR REEDUCATION: CPT

## 2020-03-04 PROCEDURE — 97116 GAIT TRAINING THERAPY: CPT

## 2020-03-04 PROCEDURE — 97530 THERAPEUTIC ACTIVITIES: CPT

## 2020-03-04 PROCEDURE — 97110 THERAPEUTIC EXERCISES: CPT

## 2020-03-04 PROCEDURE — 97535 SELF CARE MNGMENT TRAINING: CPT

## 2020-03-04 RX ADMIN — ASPIRIN 81 MG: 81 TABLET, CHEWABLE ORAL at 08:32

## 2020-03-04 RX ADMIN — FUROSEMIDE 20 MG: 20 TABLET ORAL at 08:33

## 2020-03-04 RX ADMIN — CARVEDILOL 12.5 MG: 25 TABLET, FILM COATED ORAL at 18:30

## 2020-03-04 RX ADMIN — CARVEDILOL 12.5 MG: 25 TABLET, FILM COATED ORAL at 08:32

## 2020-03-04 RX ADMIN — POTASSIUM CHLORIDE 10 MEQ: 750 TABLET, FILM COATED, EXTENDED RELEASE ORAL at 08:32

## 2020-03-04 RX ADMIN — MAGNESIUM HYDROXIDE 10 ML: 2400 SUSPENSION ORAL at 04:52

## 2020-03-04 RX ADMIN — MAGNESIUM GLUCONATE 500 MG ORAL TABLET 400 MG: 500 TABLET ORAL at 12:48

## 2020-03-04 RX ADMIN — IMIPRAMINE HYDROCHLORIDE 10 MG: 10 TABLET ORAL at 22:21

## 2020-03-04 RX ADMIN — ATORVASTATIN CALCIUM 40 MG: 40 TABLET, FILM COATED ORAL at 22:21

## 2020-03-04 RX ADMIN — FAMOTIDINE 40 MG: 20 TABLET, FILM COATED ORAL at 08:32

## 2020-03-04 NOTE — THERAPY TREATMENT NOTE
Inpatient Rehabilitation - Physical Therapy Treatment Note   Dominic     Patient Name: Connie Arango  : 1940  MRN: [id number]    Today's Date: 3/4/2020                 Admit Date: 2020      Visit Dx:    No diagnosis found.    Patient Active Problem List   Diagnosis   • ICH (intracerebral hemorrhage) (CMS/HCC)       Therapy Treatment    IRF Treatment Summary     Row Name 20 1500 20 1440          Evaluation/Treatment Time and Intent    Subjective Information  no complaints  -RG  no complaints  -AB     Existing Precautions/Restrictions  fall;seizures global aphasia  -RG  fall;seizures;other (see comments) aphasia  -AB     Document Type  therapy note (daily note)  -RG  therapy note (daily note)  -AB     Mode of Treatment  individual therapy;physical therapy  -RG  occupational therapy  -AB     Patient/Family Observations  Pt and nursing in agreement for skilled PT.   -RG  --     Recorded by [RG] Prakash Haque PTA [AB] Suha Marinelli OT     Row Name 20 1500 20 1440          Cognition/Psychosocial- PT/OT    Affect/Mental Status (Cognitive)  -- aphasia  -RG  -- aphasia  -AB     Follows Commands (Cognition)  verbal cues/prompting required;physical/tactile prompts required;repetition of directions required;25-49% accuracy  -RG  verbal cues/prompting required;visual queue;physical/tactile prompts required;repetition of directions required;increased processing time needed  -AB     Personal Safety Interventions  gait belt;supervised activity;nonskid shoes/slippers when out of bed  -RG  fall prevention program maintained;gait belt;nonskid shoes/slippers when out of bed;supervised activity  -AB     Cognitive Function (Cognitive)  safety deficit  -RG  --     Attention Deficit (Cognitive)  requires cues/redirection to task  -RG  requires cues/redirection to task;distractible in noisy environment  -AB     Recorded by [RG] Prakash Haque PTA [AB] Suha Marinelli OT     [facility]  Name 03/04/20 1440             Transfer Assessment/Treatment    Transfer Assessment/Treatment  toilet transfer  -AB      Recorded by [AB] Suha Marinelli, OT      Row Name 03/04/20 1500             Bed-Chair Transfer    Bed-Chair [place] (Transfers)  contact guard;verbal cues;nonverbal cues (demo/gesture)  -RG      Assistive Device (Bed-Chair Transfers)  wheelchair  -RG      Recorded by [RG] Prakash aHque PTA      Row Name 03/04/20 1500             Chair-Bed Transfer    Chair-Bed [place] (Transfers)  contact guard;verbal cues;nonverbal cues (demo/gesture)  -RG      Assistive Device (Chair-Bed Transfers)  wheelchair  -RG      Recorded by [RG] Prakash Haque PTA      Row Name 03/04/20 1500             Sit-Stand Transfer    Sit-Stand [place] (Transfers)  contact guard;verbal cues;nonverbal cues (demo/gesture)  -RG      Assistive Device (Sit-Stand Transfers)  walker, front-wheeled  -RG      Recorded by [RG] Prakash Haque PTA      Row Name 03/04/20 1500             Stand-Sit Transfer    Stand-Sit [place] (Transfers)  contact guard;verbal cues;nonverbal cues (demo/gesture)  -RG      Assistive Device (Stand-Sit Transfers)  walker, front-wheeled  -RG      Recorded by [RG] Prakash Haque PTA      Row Name 03/04/20 1500 03/04/20 1440          Toilet Transfer    [place] Level (Toilet Transfer)  -- x 2 trials  -RG  contact guard;verbal cues;nonverbal cues (demo/gesture)  -AB     Assistive Device (Toilet Transfer)  --  wheelchair;grab bars/safety frame  -AB     Recorded by [RG] Prakash Haque PTA [AB] Suha Marinelli, OT     Row Name 03/04/20 1500             Gait/Stairs Assessment/Training    [place] Level (Gait)  contact guard;verbal cues;nonverbal cues (demo/gesture)  -RG      Assistive Device (Gait)  walker, front-wheeled  -RG      Distance in Feet (Gait)  320' in am and 320' in pm  -RG      Deviations/Abnormal Patterns (Gait)  dmitry decreased;gait speed decreased;stride  length decreased  -RG      Bilateral Gait Deviations  forward flexed posture  -RG      Recorded by [RG] Prakash Haque PTA      Row Name 03/04/20 1500             Safety Issues, Functional Mobility    Impairments Affecting Function (Mobility)  balance;cognition;coordination;endurance/activity tolerance;motor control;motor planning;muscle tone abnormal;strength  -RG      Recorded by [RG] Prakash Haque PTA      Row Name 03/04/20 1440             Grooming Assessment/Treatment    Grooming [place] Level  minimum assist (75% patient effort);verbal cues;nonverbal cues (demo/gesture)  -AB      Grooming Position  sink side;supported sitting  -AB      Recorded by [AB] Suha Marinelli OT      Row Name 03/04/20 1440             Toileting Assessment/Treatment    Toileting [place] Level  minimum assist (75% patient effort);verbal cues;nonverbal cues (demo/gesture)  -AB      Assistive Device Use (Toileting)  grab bar/safety frame  -AB      Toileting Position  unsupported sitting  -AB      Recorded by [AB] Suha Marinelli OT      Row Name 03/04/20 1500             Gross Motor Coordination    Gross Motor Impairments  -- <coordination  -RG      Recorded by [RG] Prakash Haque PTA      Row Name 03/04/20 1500             Pain Scale: FACES Pre/Post-Treatment    Pain: FACES Scale, Pretreatment  0-->no hurt  -RG      Pain: FACES Scale, Post-Treatment  0-->no hurt  -RG      Recorded by [RG] Prakash Haque PTA      Row Name 03/04/20 1440             Upper Extremity Seated Therapeutic Exercise    Device, Seated Upper Extremity (Therapeutic Exercise)  restorator/arm bike [id number], 0 resistance  -AB      Exercise Type, Seated Upper Extremity (Therapeutic Exercise)  AROM (active range of motion) BUE CoordEx; G/FMC TherEx/Act; strengthening  -AB      Expected Outcomes, Seated Upper Extremity (Therapeutic Exercise)  improve functional tolerance, self-care activity;improve performance, BADLs;improve performance,  transfer skills;improve performance, fine motor coordination skills;improve performance, gross motor coordination skills  -AB      Recorded by [AB] Suha Marinelli OT      Row Name 03/04/20 1500             Lower Extremity Standing Therapeutic Exercise    Performed, Standing Lower Extremity (Therapeutic Exercise)  hip flexion/extension;hip abduction/adduction;heel raises;knee flexion/extension  -RG      Device, Standing Lower Extremity (Therapeutic Exercise)  parallel bars  -RG      Exercise Type, Standing Lower Extremity (Therapeutic Exercise)  AROM (active range of motion)  -RG      Expected Outcomes, Standing Lower Extremity (Therapeutic Exercise)  improve functional tolerance, community activity;improve functional tolerance, household activity;improve functional tolerance, self-care activity;improve performance, gait skills;improve performance, transfer skills  -RG      Sets/Reps Detail, Standing Lower Extremity (Therapeutic Exercise)  20  -RG      Comment, Standing Lower Extremity (Therapeutic Exercise)  Verbal and tactile cues required for task initiation and completion.   -RG      Recorded by [RG] Prakash Haque PTA      Row Name 03/04/20 1500             Lower Extremity Seated Therapeutic Exercise    Performed, Seated Lower Extremity (Therapeutic Exercise)  hip flexion/extension;hip abduction/adduction;LAQ (long arc quad), knee extension;knee flexion/extension;ankle dorsiflexion/plantarflexion  -RG      Device, Seated Lower Extremity (Therapeutic Exercise)  elastic bands/tubing;free weights, cuff;small ball  -RG      Exercise Type, Seated Lower Extremity (Therapeutic Exercise)  AROM (active range of motion)  -RG      Sets/Reps Detail, Seated Lower Extremity (Therapeutic Exercise)  20  -RG      Comment, Seated Lower Extremity (Therapeutic Exercise)  cues to stay on task  -RG      Recorded by [RG] Prakash Haque PTA      Row Name 03/04/20 1500             Lower Extremity Supine Therapeutic Exercise     Performed, Supine Lower Extremity (Therapeutic Exercise)  hip abduction/adduction;SAQ (short arc quad) over bolster;heel slides;ankle pumps  -RG      Device, Supine Lower Extremity (Therapeutic Exercise)  foam roll  -RG      Exercise Type, Supine Lower Extremity (Therapeutic Exercise)  AROM (active range of motion)  -RG      Expected Outcomes, Supine Lower Extremity (Therapeutic Exercise)  improve functional tolerance, community activity;improve functional tolerance, household activity;improve functional tolerance, self-care activity;improve performance, gait skills;improve performance, transfer skills  -RG      Comment, Supine Lower Extremity (Therapeutic Exercise)  cues to stay on task  -RG      Recorded by [RG] Prakash Haque PTA      Row Name 03/04/20 1500 03/04/20 1440          Positioning and Restraints    Pre-Treatment Position  in bed  -RG  --     Post Treatment Position  bed  -RG  wheelchair  -AB     In Bed  notified nsg;supine;call light within reach;encouraged to call for assist;legs elevated  -RG  --     In Wheelchair  --  sitting;with SLP  -AB     Recorded by [RG] Prakash Haque PTA [AB] Suha Marinelli, OT       User Key  (r) = Recorded By, (t) = Taken By, (c) = Cosigned By    Initials Name Effective Dates    AB Suha Marinelli, OT 04/03/18 -     RG Prakash Haque PTA 10/31/19 -                  PT Recommendation and Plan                        Time Calculation:     PT Charges     Row Name 03/04/20 1511 03/04/20 1509          Time Calculation    Start Time  1250  -RG  1055  -RG     Stop Time  1330  -RG  1155  -RG     Time Calculation (min)  40 min  -RG  60 min  -RG     PT Received On  --  03/04/20  -RG     PT Goal Re-Cert Due Date  --  03/05/20  -RG        Time Calculation- PT    Total Timed Code Minutes- PT  40 minute(s)  -RG  60 minute(s)  -RG       User Key  (r) = Recorded By, (t) = Taken By, (c) = Cosigned By    Initials Name Provider Type    RG Prakash Haque PTA Physical  Therapy Assistant          Therapy Charges for Today     Code Description Service Date Service Provider Modifiers Qty    53208211021 HC GAIT TRAINING EA 15 MIN 3/3/2020 Prakash Haque, PTA GP 2    59245041137 HC PT THERAPEUTIC ACT EA 15 MIN 3/3/2020 Prakash Haque, PTA GP 2    31384308484 HC PT THER PROC EA 15 MIN 3/3/2020 Prakash Haque, PTA GP 3    70150248759 HC GAIT TRAINING EA 15 MIN 3/4/2020 Prakash Haque, PTA GP 2    13401622495 HC PT THERAPEUTIC ACT EA 15 MIN 3/4/2020 Prakash Haque, PTA GP 1    81272057318 HC PT THER PROC EA 15 MIN 3/4/2020 Prakash Haque, PTA GP 4                   Prakash Haque PTA  3/4/2020

## 2020-03-04 NOTE — SIGNIFICANT NOTE
03/04/20 0935   Plan   Plan SS received call from pt's brother Rohan Nevarez who voiced concerns about pt going home with son at discharge.  Brother called pt's son yesterday and discussed pt going to SNF for continued rehab for short-term and he declined this option.  Brother says he wants pt to return home and son to be able to assist her but feels receiving continued rehab at SNF would be best at this time.  Brother wanted SS to contact son and suggests this option and see if he may agree.

## 2020-03-04 NOTE — PROGRESS NOTES
Occupational Therapy:    Physical Therapy:    Speech Language Pathology: Individual: 45 minutes.    Signed by: Loly Estrada, Supervisor

## 2020-03-04 NOTE — THERAPY TREATMENT NOTE
Inpatient Rehabilitation - Occupational Therapy Treatment Note     Dominic     Patient Name: Connie Arango  : 1940  MRN: [id number]    Today's Date: 3/4/2020                 Admit Date: 2020      Visit Dx:  No diagnosis found.    Patient Active Problem List   Diagnosis   • ICH (intracerebral hemorrhage) (CMS/HCC)         Therapy Treatment    IRF Treatment Summary     Row Name 20 1440             Evaluation/Treatment Time and Intent    Subjective Information  no complaints  -AB      Existing Precautions/Restrictions  fall;seizures;other (see comments) aphasia  -AB      Document Type  therapy note (daily note)  -AB      Mode of Treatment  occupational therapy  -AB      Recorded by [AB] Suha Marinelli OT      Row Name 20 1440             Cognition/Psychosocial- PT/OT    Affect/Mental Status (Cognitive)  -- aphasia  -AB      Follows Commands (Cognition)  verbal cues/prompting required;visual queue;physical/tactile prompts required;repetition of directions required;increased processing time needed  -AB      Personal Safety Interventions  fall prevention program maintained;gait belt;nonskid shoes/slippers when out of bed;supervised activity  -AB      Attention Deficit (Cognitive)  requires cues/redirection to task;distractible in noisy environment  -AB      Recorded by [AB] Suha Marinelli OT      Row Name 20 1440             Transfer Assessment/Treatment    Transfer Assessment/Treatment  toilet transfer  -AB      Recorded by [AB] Suha Marinelli OT      Row Name 20 1440             Toilet Transfer    [place] Level (Toilet Transfer)  contact guard;verbal cues;nonverbal cues (demo/gesture)  -AB      Assistive Device (Toilet Transfer)  wheelchair;grab bars/safety frame  -AB      Recorded by [AB] Suha Marinelli OT      Row Name 20 1440             Grooming Assessment/Treatment    Grooming [place] Level  minimum assist (75% patient  effort);verbal cues;nonverbal cues (demo/gesture)  -AB      Grooming Position  sink side;supported sitting  -AB      Recorded by [AB] Suha Marinelli OT      Row Name 03/04/20 1440             Toileting Assessment/Treatment    Toileting [place] Level  minimum assist (75% patient effort);verbal cues;nonverbal cues (demo/gesture)  -AB      Assistive Device Use (Toileting)  grab bar/safety frame  -AB      Toileting Position  unsupported sitting  -AB      Recorded by [AB] Suha Marinelli OT      Row Name 03/04/20 1440             Upper Extremity Seated Therapeutic Exercise    Device, Seated Upper Extremity (Therapeutic Exercise)  restorator/arm bike [id number], 0 resistance  -AB      Exercise Type, Seated Upper Extremity (Therapeutic Exercise)  AROM (active range of motion) BUE CoordEx; G/FMC TherEx/Act; strengthening  -AB      Expected Outcomes, Seated Upper Extremity (Therapeutic Exercise)  improve functional tolerance, self-care activity;improve performance, BADLs;improve performance, transfer skills;improve performance, fine motor coordination skills;improve performance, gross motor coordination skills  -AB      Recorded by [AB] Suha Marinelli OT      Row Name 03/04/20 1440             Positioning and Restraints    Post Treatment Position  wheelchair  -AB      In Wheelchair  sitting;with SLP  -AB      Recorded by [AB] Suha Marinelli OT        User Key  (r) = Recorded By, (t) = Taken By, (c) = Cosigned By    Initials Name Effective Dates    Suha Vincent OT 04/03/18 -                  OT Recommendation and Plan                 OT IRF GOALS     Row Name 02/27/20 1523             Bathing Goal 1 (OT-IRF)    [place] Level (Bathing Goal 1, OT-IRF)  moderate assist (50-74% patient effort)  -      Time Frame (Bathing Goal 1, OT-IRF)  long term goal (LTG);by discharge  -         LB Dressing Goal 1 (OT-IRF)    [place] (LB Dressing Goal 1, OT-IRF)  moderate  assist (50-74% patient effort)  -CJ      Time Frame (LB Dressing Goal 1, OT-IRF)  short term goal (STG)  -CJ         LB Dressing Goal 2 (OT-IRF)    [place] (LB Dressing Goal 2, OT-IRF)  minimum assist (75% or more patient effort)  -CJ      Time Frame (LB Dressing Goal 2, OT-IRF)  long term goal (LTG);by discharge  -CJ         Toileting Goal 1 (OT-IRF)    [place] Level (Toileting Goal 1, OT-IRF)  maximum assist (25-49% patient effort)  -CJ      Time Frame (Toileting Goal 1, OT-IRF)  short term goal (STG)  -CJ         Toileting Goal 2 (OT-IRF)    [place] Level (Toileting Goal 2, OT-IRF)  moderate assist (50-74% patient effort)  -CJ      Time Frame (Toileting Goal 2, OT-IRF)  long term goal (LTG);by discharge  -        User Key  (r) = Recorded By, (t) = Taken By, (c) = Cosigned By    Initials Name Provider Type    Daylin Beckman OT Occupational Therapist                     Time Calculation:     Time Calculation- OT     Row Name 03/04/20 1439             Time Calculation- OT    OT Start Time  0800  -AB      OT Stop Time  0915  -AB      OT Time Calculation (min)  75 min  -AB      Total Timed Code Minutes- OT  75 minute(s)  -AB      OT Non-Billable Time (min)  15 min  -AB        User Key  (r) = Recorded By, (t) = Taken By, (c) = Cosigned By    Initials Name Provider Type    AB Suha Marinelli, OT Occupational Therapist          Therapy Charges for Today     Code Description Service Date Service Provider Modifiers Qty    42843247684 HC OT NEUROMUSC RE EDUCATION EA 15 MIN 3/4/2020 Suha Marinelli OT GO 2    54907010510 HC OT SELF CARE/MGMT/TRAIN EA 15 MIN 3/4/2020 Suha Marinelli OT GO 2    87634979585 HC OT THER PROC EA 15 MIN 3/4/2020 Suha Marinelli OT GO 1                   Suha Marinelli OT  3/4/2020

## 2020-03-04 NOTE — PROGRESS NOTES
Occupational Therapy: Individual: 75 minutes.    Physical Therapy:    Speech Language Pathology:    Signed by: Suha Marinelli, Occupational Therapist

## 2020-03-04 NOTE — PROGRESS NOTES
PROGRESS NOTE         Patient Identification:  Name:  Connie Arango  Age:  79 y.o.  Sex:  female  :  1940  MRN:  [id number]  Visit Number:  [id number]  Primary Care Provider:  Luis Enrique Prieto MD         LOS: 8 days       ----------------------------------------------------------------------------------------------------------------------  Subjective       Chief Complaints:    Intracranial hemorrhage   Encephalopathy   debility      Interval History:      Patient had an uneventful night and had some complaint of insomnia earlier through the night but then slept good otherwise.  I discussed with her the possibility of adjusting her sleeping medication regimen if needed.  Labs ordered for a.m. to include CBC CMP.    Patient participated with speech therapy, occupational therapy, and physical therapy on 3/4/20. Toilet transfer training is at contact guard with verbal and nonverbal cues and using a wheelchair, grab bars, and safety frame. Grooming assessment is at minimum assist with verbal and nonverbal cues. She uses the sink side and supported sitting. Toileting assessment is at minimum assist with verbal and nonverbal cues using the grab bars and safety frame. Bathing goal is at moderate assist and projected to meed this goal by discharge.     Patient participated with speech therapy, occupational therapy, and physical therapy on 3/3/20. Bed-chair transfer is at contact guard with verbal and nonverbal cues using a wheelchair. Chair-bed transfer is at contact guard with verbal and nonverbal cues and using a wheelchair. Sit-stand transfer is at contact guard with verbal and nonverbal cues and using a front-wheeled walker. Stand sit is at contact guard with verbal and nonverbal cues and using a front-wheeled walker. Patient ambulated 320 feet in the AM and 320 feet in the PM at contact guard with verbal and nonverbal cues and using a front-wheeled walker.       Review of  Systems:    Constitutional: no fever, chills and night sweats.  Eyes: no eye drainage, itching or redness.  HEENT: no mouth sores, dysphagia or nose bleed.  Respiratory: no for shortness of breath, cough or production of sputum.  Cardiovascular: no chest pain, no palpitations, no orthopnea.  Gastrointestinal: no nausea, vomiting or diarrhea. No abdominal pain, hematemesis or rectal bleeding.  Genitourinary: no dysuria or polyuria.  Hematologic/lymphatic: no lymph node abnormalities, no easy bruising or easy bleeding.  Musculoskeletal: no muscle or joint pain.  Skin: No rash and no itching.  Neurological: no loss of consciousness, no seizure, no headache.  Behavioral/Psych: no depression or suicidal ideation.  Endocrine: no hot flashes.  Immunologic: negative.  ----------------------------------------------------------------------------------------------------------------------      Objective       [facility] Meds:    aspirin 81 mg Oral Daily   atorvastatin 40 mg Oral Nightly   carvedilol 12.5 mg Oral BID With Meals   famotidine 40 mg Oral Daily   furosemide 20 mg Oral Daily   imipramine 10 mg Oral Nightly   magnesium oxide 400 mg Oral Daily   potassium chloride 10 mEq Oral Daily        ----------------------------------------------------------------------------------------------------------------------    Vital Signs:  Temp:  [97.5 °F (36.4 °C)-98.7 °F (37.1 °C)] 98.7 °F (37.1 °C)  Heart Rate:  [63-75] 75  Resp:  [18-20] 20  BP: (110-122)/(56-65) 115/56  No data found.  SpO2 Percentage    03/03/20 1911 03/04/20 0705 03/04/20 1914   SpO2: 94% 95% 94%     SpO2:  [94 %-95 %] 94 %  on   ;   Device (Oxygen Therapy): room air    Body mass index is 26.45 kg/m².  Wt Readings from Last 3 Encounters:   02/26/20 72 kg (158 lb 11.7 oz)        Intake/Output Summary (Last 24 hours) at 3/5/2020 0607  Last data filed at 3/5/2020 0454  Gross per 24 hour   Intake 960 ml   Output --   Net 960 ml     Diet Pureed;  Thin  ----------------------------------------------------------------------------------------------------------------------      Physical Exam:     General Appearance: alert and pleasant.  Aside from some insomnia seems to be doing great with no acute distress this morning    Head: normocephalic, without obvious abnormality and atraumatic     Eyes: lids and lashes normal, conjunctivae and sclerae normal, no icterus, no pallor, corneas clear and PERRLA     Ears: ears appear intact with no abnormalities noted     Nose: nares normal, septum midline, mucosa normal and no drainage                Throat: no oral lesions, no thrush, oral mucosa moist and oopharynx normal     Neck: no adenopathy, supple, trachea midline, no thyromegaly, no carotid bruit and no JVD     Back: no kyphosis present, no scoliosis present, no skin lesions, erythema, or scars, no tenderness to percussion or palpation and range of motion normal     Lungs: clear to auscultation, respirations regular, respirations even and  respirations unlabored. No accessory muscle use.      Heart:: regular rhythm & normal rate, normal S1, S2, no murmur, no gallop, no rub and no click.  Chest wall with no abnormalities observed. PMI nondisplaced     Abdomen: normal bowel sounds in all quadrants, no masses, no hepatomegaly, no splenomegaly, soft non-tender, no guarding and no rebound tenderness     Extremities: no edema, no cyanosis, no redness, no tenderness, no clubbing     Musculoskeletal: joints with no effusion nor erythema nor warmth.  Pedal pulses palpable and equal bilaterally     Skin: no bleeding, bruising or rash and no lesions noted     Lymph Nodes: no palpable adenopathy     Neurologic: Alert and awake.  Mild aphasia.              Sensory intact in BLE and BUE.              Motor strength Generalized weakness  ----------------------------------------------------------------------------------------------------------------------            Results from  last 7 days   Lab Units 02/27/20  0657   WBC 10*3/mm3 6.26   HEMOGLOBIN g/dL 12.5   HEMATOCRIT % 40.2   MCV fL 89.3   MCHC g/dL 31.1*   PLATELETS 10*3/mm3 382     Results from last 7 days   Lab Units 02/27/20  0657   SODIUM mmol/L 140   POTASSIUM mmol/L 4.2   MAGNESIUM mg/dL 2.2   CHLORIDE mmol/L 104   CO2 mmol/L 24.5   BUN mg/dL 22   CREATININE mg/dL 0.85   EGFR IF NONAFRICN AM mL/min/1.73 64   CALCIUM mg/dL 9.8   GLUCOSE mg/dL 97   ALBUMIN g/dL 3.23*   BILIRUBIN mg/dL 0.6   ALK PHOS U/L 92   AST (SGOT) U/L 30   ALT (SGPT) U/L 32   Estimated Creatinine Clearance: 52.4 mL/min (by C-G formula based on SCr of 0.85 mg/dL).  No results found for: AMMONIA    No results found for: HGBA1C, POCGLU  No results found for: HGBA1C  No results found for: TSH, FREET4    No results found for: BLOODCX  No results found for: URINECX  No results found for: WOUNDCX  No results found for: STOOLCX  No results found for: RESPCX  Pain Management Panel     There is no flowsheet data to display.            ----------------------------------------------------------------------------------------------------------------------  Imaging Results (Last 24 Hours)     ** No results found for the last 24 hours. **          ----------------------------------------------------------------------------------------------------------------------    Assessment/Plan       Assessment/Plan     ASSESSMENT:    Multifocal CVA/left parietotemporal ICH   Small acute corical ischemic infarcts in the left parietal Lobe and right parieto-occipital region  Left parietotemporal hemorrhage  Encephalopathy  Acute hypoxia respiratory failure  HTN  Hypotension  Small bilateral pleural effusions  Dysphagia  Global aphasia     PLAN:    Patient had an uneventful night and had some complaint of insomnia earlier through the night but then slept good otherwise.  I discussed with her the possibility of adjusting her sleeping medication regimen if needed.  Labs ordered for a.m. to  include CBC CMP.    Patient participated with speech therapy, occupational therapy, and physical therapy on 3/4/20. Toilet transfer training is at contact guard with verbal and nonverbal cues and using a wheelchair, grab bars, and safety frame. Grooming assessment is at minimum assist with verbal and nonverbal cues. She uses the sink side and supported sitting. Toileting assessment is at minimum assist with verbal and nonverbal cues using the grab bars and safety frame. Bathing goal is at moderate assist and projected to meed this goal by discharge.     Patient participated with speech therapy, occupational therapy, and physical therapy on 3/3/20. Bed-chair transfer is at contact guard with verbal and nonverbal cues using a wheelchair. Chair-bed transfer is at contact guard with verbal and nonverbal cues and using a wheelchair. Sit-stand transfer is at contact guard with verbal and nonverbal cues and using a front-wheeled walker. Stand sit is at contact guard with verbal and nonverbal cues and using a front-wheeled walker. Patient ambulated 320 feet in the AM and 320 feet in the PM at contact guard with verbal and nonverbal cues and using a front-wheeled walker.      Code Status:   Code Status and Medical Interventions:   Ordered at: 02/26/20 1948     Code Status:    CPR     Medical Interventions (Level of Support Prior to Arrest):    Full       Sean Coppola MD  03/05/20  6:07 AM

## 2020-03-04 NOTE — PROGRESS NOTES
Rehabilitation Nursing  Inpatient Rehabilitation Plan of Care Note    Plan of Care  Copy from [facility]    Pain Management (Active)  Current Status (2/26/2020 4:32:00 PM): Potential for pain  Weekly Goal: No pain this week  Discharge Goal: no pain    Safety    Potential for Injury (Active)  Current Status (2/26/2020 4:32:00 PM): At risk for injury  Weekly Goal: No injury this week  Discharge Goal: No injury    Signed by: Taylor George Nurse

## 2020-03-04 NOTE — SIGNIFICANT NOTE
03/04/20 5906   Plan   Plan Contacted pt's son Dilip [phone] to discuss discharge plans including option of short-term SNF placement for continued rehab.  Informed son pt has a [employer] Medicare replacement insurance which requires [place] for SNF admission.  Informed him there are some NH's in [place] who may be in-network with pt's insurance if he chooses this option.  Encouraged son to talk to family about plans and this option and SS can call him back tomorrow regarding plans.  Son is agreeable to think about SNF placement.  Will follow.   Patient/Family in Agreement with Plan yes

## 2020-03-04 NOTE — PROGRESS NOTES
Occupational Therapy:    Physical Therapy:    Speech Language Pathology: Individual: 45 minutes.    Signed by: Alexus Yanes Speech therapist

## 2020-03-04 NOTE — PROGRESS NOTES
PROGRESS NOTE         Patient Identification:  Name:  Connie Arango  Age:  79 y.o.  Sex:  female  :  1940  MRN:  [id number]  Visit Number:  [id number]  Primary Care Provider:  Luis Enrique Prieto MD         LOS: 7 days       ----------------------------------------------------------------------------------------------------------------------  Subjective       Chief Complaints:    Intracranial hemorrhage   Encephalopathy   debility      Interval History:      Patient participated with speech therapy, occupational therapy, and physical therapy on 3/3/20. Bed-chair transfer is at contact guard with verbal and nonverbal cues using a wheelchair. Chair-bed transfer is at contact guard with verbal and nonverbal cues and using a wheelchair. Sit-stand transfer is at contact guard with verbal and nonverbal cues and using a front-wheeled walker. Stand sit is at contact guard with verbal and nonverbal cues and using a front-wheeled walker. Patient ambulated 320 feet in the AM and 320 feet in the PM at contact guard with verbal and nonverbal cues and using a front-wheeled walker.     Overall patient seems to be very stable with no issues reported overnight.  No fever and blood pressure very well controlled.  Patient denies any chest pain shortness of breath nausea vomiting or diarrhea and unfortunately gets slightly confused at night but she seems to be very stable this morning.    Patient participated with speech therapy, occupational therapy, and physical therapy on 3/2/20. Bed-chair transfer is at contact guard with verbal and nonverbal cues using a wheelchair. Chair-bed transfer is at contact guard with verbal and nonverbal cues using a wheelchair. Sit-stand transfer is at contact guard with verbal and nonverbal cues using a front-wheeled walker. Stand-sit transfer is at contact guard with verbal and nonverbal cues using a front-wheeled walker. Patient ambulated 160 feet x2 in the AM and 160 feet in the  PM.       Review of Systems:    Constitutional: no fever, chills and night sweats.  Eyes: no eye drainage, itching or redness.  HEENT: no mouth sores, dysphagia or nose bleed.  Respiratory: no for shortness of breath, cough or production of sputum.  Cardiovascular: no chest pain, no palpitations, no orthopnea.  Gastrointestinal: no nausea, vomiting or diarrhea. No abdominal pain, hematemesis or rectal bleeding.  Genitourinary: no dysuria or polyuria.  Hematologic/lymphatic: no lymph node abnormalities, no easy bruising or easy bleeding.  Musculoskeletal: no muscle or joint pain.  Skin: No rash and no itching.  Neurological: no loss of consciousness, no seizure, no headache.  Behavioral/Psych: no depression or suicidal ideation.  Endocrine: no hot flashes.  Immunologic: negative.  ----------------------------------------------------------------------------------------------------------------------      Objective       [facility] Meds:    aspirin 81 mg Oral Daily   atorvastatin 40 mg Oral Nightly   carvedilol 12.5 mg Oral BID With Meals   famotidine 40 mg Oral Daily   furosemide 20 mg Oral Daily   imipramine 10 mg Oral Nightly   magnesium oxide 400 mg Oral Daily   potassium chloride 10 mEq Oral Daily        ----------------------------------------------------------------------------------------------------------------------    Vital Signs:  Temp:  [97.5 °F (36.4 °C)-98.5 °F (36.9 °C)] 97.5 °F (36.4 °C)  Heart Rate:  [63-80] 63  Resp:  [18] 18  BP: (110-145)/(59-75) 110/59  No data found.  SpO2 Percentage    03/03/20 0730 03/03/20 1911 03/04/20 0705   SpO2: 95% 94% 95%     SpO2:  [94 %-95 %] 95 %  on   ;   Device (Oxygen Therapy): room air    Body mass index is 26.45 kg/m².  Wt Readings from Last 3 Encounters:   02/26/20 72 kg (158 lb 11.7 oz)        Intake/Output Summary (Last 24 hours) at 3/4/2020 1207  Last data filed at 3/4/2020 0908  Gross per 24 hour   Intake 1320 ml   Output --   Net 1320 ml     Diet  Pureed; Thin  ----------------------------------------------------------------------------------------------------------------------      Physical Exam:     General Appearance: alert and pleasant.  Doing great this morning.  Very pleasant and answering questions appropriately.    Head: normocephalic, without obvious abnormality and atraumatic     Eyes: lids and lashes normal, conjunctivae and sclerae normal, no icterus, no pallor, corneas clear and PERRLA     Ears: ears appear intact with no abnormalities noted     Nose: nares normal, septum midline, mucosa normal and no drainage                Throat: no oral lesions, no thrush, oral mucosa moist and oopharynx normal     Neck: no adenopathy, supple, trachea midline, no thyromegaly, no carotid bruit and no JVD     Back: no kyphosis present, no scoliosis present, no skin lesions, erythema, or scars, no tenderness to percussion or palpation and range of motion normal     Lungs: clear to auscultation, respirations regular, respirations even and  respirations unlabored. No accessory muscle use.      Heart:: regular rhythm & normal rate, normal S1, S2, no murmur, no gallop, no rub and no click.  Chest wall with no abnormalities observed. PMI nondisplaced     Abdomen: normal bowel sounds in all quadrants, no masses, no hepatomegaly, no splenomegaly, soft non-tender, no guarding and no rebound tenderness     Extremities: no edema, no cyanosis, no redness, no tenderness, no clubbing     Musculoskeletal: joints with no effusion nor erythema nor warmth.  Pedal pulses palpable and equal bilaterally     Skin: no bleeding, bruising or rash and no lesions noted     Lymph Nodes: no palpable adenopathy     Neurologic: Alert and awake.  Mild aphasia.              Sensory intact in BLE and BUE.              Motor strength Generalized weakness  ----------------------------------------------------------------------------------------------------------------------            Results from  last 7 days   Lab Units 02/27/20  0657   WBC 10*3/mm3 6.26   HEMOGLOBIN g/dL 12.5   HEMATOCRIT % 40.2   MCV fL 89.3   MCHC g/dL 31.1*   PLATELETS 10*3/mm3 382     Results from last 7 days   Lab Units 02/27/20  0657   SODIUM mmol/L 140   POTASSIUM mmol/L 4.2   MAGNESIUM mg/dL 2.2   CHLORIDE mmol/L 104   CO2 mmol/L 24.5   BUN mg/dL 22   CREATININE mg/dL 0.85   EGFR IF NONAFRICN AM mL/min/1.73 64   CALCIUM mg/dL 9.8   GLUCOSE mg/dL 97   ALBUMIN g/dL 3.23*   BILIRUBIN mg/dL 0.6   ALK PHOS U/L 92   AST (SGOT) U/L 30   ALT (SGPT) U/L 32   Estimated Creatinine Clearance: 52.4 mL/min (by C-G formula based on SCr of 0.85 mg/dL).  No results found for: AMMONIA    No results found for: HGBA1C, POCGLU  No results found for: HGBA1C  No results found for: TSH, FREET4    No results found for: BLOODCX  No results found for: URINECX  No results found for: WOUNDCX  No results found for: STOOLCX  No results found for: RESPCX  Pain Management Panel     There is no flowsheet data to display.            ----------------------------------------------------------------------------------------------------------------------  Imaging Results (Last 24 Hours)     ** No results found for the last 24 hours. **          ----------------------------------------------------------------------------------------------------------------------    Assessment/Plan       Assessment/Plan     ASSESSMENT:    Multifocal CVA/left parietotemporal ICH   Small acute corical ischemic infarcts in the left parietal Lobe and right parieto-occipital region  Left parietotemporal hemorrhage  Encephalopathy  Acute hypoxia respiratory failure  HTN  Hypotension  Small bilateral pleural effusions  Dysphagia  Global aphasia     PLAN:    Patient participated with speech therapy, occupational therapy, and physical therapy on 3/3/20. Bed-chair transfer is at contact guard with verbal and nonverbal cues using a wheelchair. Chair-bed transfer is at contact guard with verbal and  nonverbal cues and using a wheelchair. Sit-stand transfer is at contact guard with verbal and nonverbal cues and using a front-wheeled walker. Stand sit is at contact guard with verbal and nonverbal cues and using a front-wheeled walker. Patient ambulated 320 feet in the AM and 320 feet in the PM at contact guard with verbal and nonverbal cues and using a front-wheeled walker.    Overall patient seems to be very stable with no issues reported overnight.  No fever and blood pressure very well controlled.  Patient denies any chest pain shortness of breath nausea vomiting or diarrhea and unfortunately gets slightly confused at night but she seems to be very stable this morning.    Patient participated with speech therapy, occupational therapy, and physical therapy on 3/2/20. Bed-chair transfer is at contact guard with verbal and nonverbal cues using a wheelchair. Chair-bed transfer is at contact guard with verbal and nonverbal cues using a wheelchair. Sit-stand transfer is at contact guard with verbal and nonverbal cues using a front-wheeled walker. Stand-sit transfer is at contact guard with verbal and nonverbal cues using a front-wheeled walker. Patient ambulated 160 feet x2 in the AM and 160 feet in the PM.     Patient continues to require intensive inpatient physical therapy for therapeutic exercises, range of motion, endurance, gait training, safety, stairs training, strengthening for at least 1 to 2 hours a day for 5 to 6 days a week as well as occupational therapy for dressing, ADLs, feeding, home skills, safety and toileting techniques for 1 to 2 hours a day for 5 to 6 days a week, as well as speech and language pathology therapy for communication, expression, dysphasia, aspiration needs for 30 to 90 minutes a day for 5 to 6 days a week.        Code Status:   Code Status and Medical Interventions:   Ordered at: 02/26/20 1948     Code Status:    CPR     Medical Interventions (Level of Support Prior to Arrest):       Full         Sean Coppola MD  03/04/20  12:06 PM

## 2020-03-05 PROCEDURE — 97112 NEUROMUSCULAR REEDUCATION: CPT

## 2020-03-05 PROCEDURE — 92507 TX SP LANG VOICE COMM INDIV: CPT | Performed by: SPEECH-LANGUAGE PATHOLOGIST

## 2020-03-05 PROCEDURE — 97535 SELF CARE MNGMENT TRAINING: CPT

## 2020-03-05 RX ADMIN — CARVEDILOL 12.5 MG: 25 TABLET, FILM COATED ORAL at 17:04

## 2020-03-05 RX ADMIN — POTASSIUM CHLORIDE 10 MEQ: 750 TABLET, FILM COATED, EXTENDED RELEASE ORAL at 10:29

## 2020-03-05 RX ADMIN — ASPIRIN 81 MG: 81 TABLET, CHEWABLE ORAL at 10:29

## 2020-03-05 RX ADMIN — MAGNESIUM GLUCONATE 500 MG ORAL TABLET 400 MG: 500 TABLET ORAL at 10:30

## 2020-03-05 RX ADMIN — CARVEDILOL 12.5 MG: 25 TABLET, FILM COATED ORAL at 10:29

## 2020-03-05 RX ADMIN — IMIPRAMINE HYDROCHLORIDE 10 MG: 10 TABLET ORAL at 20:47

## 2020-03-05 RX ADMIN — FUROSEMIDE 20 MG: 20 TABLET ORAL at 10:30

## 2020-03-05 RX ADMIN — ATORVASTATIN CALCIUM 40 MG: 40 TABLET, FILM COATED ORAL at 20:47

## 2020-03-05 RX ADMIN — FAMOTIDINE 40 MG: 20 TABLET, FILM COATED ORAL at 10:29

## 2020-03-05 NOTE — PROGRESS NOTES
Occupational Therapy:    Physical Therapy: Individual: 90 minutes.    Speech Language Pathology:    Signed by: Ellen Esparza, Physical Therapist

## 2020-03-05 NOTE — THERAPY TREATMENT NOTE
"Inpatient Rehabilitation - Speech Language Pathology Treatment Note  [facility]     Patient Name: Connie Arango  : 1940  MRN: [id number]  Today's Date: 3/5/2020       Admit Date: 2020  Visit Dx:    No diagnosis found.  Patient Active Problem List   Diagnosis   • ICH (intracerebral hemorrhage) (CMS/[facility])        Therapy Treatment     Ms. Arango is seen this am in speech therapy office for formal s/l therapy to address deficits. She is positioned upright and centered in wheelchair w/ gait belt attached. She is a/a cooperative to participate. She is evidenced w/ improvement in table top tasks this am.    Long Term Goal:  1.Pt will improve receptive language skills to allow for maximal communication and safety in adls.   2.Pt will improve expressive language skills to allow for maximal communication and safety in adls.      Short Term Goals:  1. Pt will receptively identify common tangible objects from field of 2 w/ 50% acc and mod cues over 3 consecutive sessions.   *pt is able to identify \"sock, ball, mug\" from FO2 given max cues w/ 60% acc. Pt unable to receptively identify any other common tangible objects. Pt is able to match letters A-Z w/ 95% acc given mod cues.  2. Pt will receptively demonstrate object fnx w/ 60% acc and mod cues over 3 consecutive sessions.   *pt able to receptively demonstrate object fnx of \"marker, spoon, cup, and mug\" w/ 60% acc given mod-max cues to initiate fnx of objects. Significant perseveration during this task.  3. Pt will id body parts w/ 40% acc and mod cues over 3 consecutive sessions.   *not directly addressed this session.  4. Pt will follow simple one step directives 3/5 opp w/ mod-max cues over 3 consecutive sessions.   *pt able to follow simple one step directives related to table top tasks w/ 60% acc given mod-max cues. Pt often requires multiple re-directions during tasks to sustain attention.  5. Pt will verbally produce biographical information 3/5 opp w/ " "mod-max cues over 3 consecutive session.   *not directly addressed this session.  6. Pt will verbally respond to simple y/n questions w/ 75% acc and mod cues over 3 consecutive sessions.   *pt verbally responds to simple y/n questions w/ 65% acc related to adls, table top tasks. Pt often perseverates during this task.   7. Pt will confrontation name common tangible objects 2/5 opp w/ mod-max cues over 3 consecutive sessions.   *pt able to name \"brush, cup, spoon, ball, sock, mug, knife\" after max cues and models from SLP.  8. Pt will perform rote speech tasks 3/5 opp w/ mod cues over 3 consecutive sessions.   *pt able to name letters A-Z given mod-max cues w/ visual stimuli on table top. Perseveration noted w/ letters \"w, x\"; pt able to verbalize DEWEY and RASHAAD after visual table top cues and max modeling from SLP. Improvement in spontaneous speech this session as well as ability to participate in tasks.    *Additional goals to be added/modified as necessary.      Thank you-  Alexus Yanes M.S., CCC-SLP      EDUCATION  The patient has been educated in the following areas:   Cognitive Impairment Communication Impairment.    SLP Recommendation and Plan    Continue tx per poc.  Improvement in spontaneous speech this session as well as ability to participate in tasks.    Time Calculation:     Time Calculation- SLP     Time Calculation- SLP     Row Name 03/05/20 1449    SLP Start Time  1045  -Recorded by: AMARIS    SLP Stop Time  1135  -Recorded by: AMARIS    SLP Time Calculation (min)  50 min  -Recorded by: AMARIS    SLP - Next Appointment  03/06/20  -Recorded by: AMARIS            User Key     Initials Name Provider Type    Alexus Lamar MS CCC-SLP Speech and Language Pathologist          Therapy Charges for Today     Code Description Service Date Service Provider Modifiers Qty    29356644515  ST TREATMENT SPEECH 3 3/4/2020 Alexus Yanes MS CCC-SLP GN 1    [id number] HC ST TREATMENT SPEECH 3 3/5/2020 Alexus Yanes, " MS CCC-SLP GN 1                     Alexus Yanes, MS CCC-SLP  3/5/2020

## 2020-03-05 NOTE — SIGNIFICANT NOTE
03/05/20 1622   Plan   Plan Referral sent to [facility] via in-basket via [facility].  [facility] will have to require PA if they can accept pt for admission and bed available.

## 2020-03-05 NOTE — THERAPY TREATMENT NOTE
Inpatient Rehabilitation - Physical Therapy Treatment Note   [facility]     Patient Name: Connie Arango  : 1940  MRN: [id number]    Today's Date: 3/5/2020                 Admit Date: 2020      Visit Dx:    No diagnosis found.    Patient Active Problem List   Diagnosis   • ICH (intracerebral hemorrhage) (CMS/HCC)       Therapy Treatment    IRF Treatment Summary     Row Name 20 153             Evaluation/Treatment Time and Intent    Subjective Information  no complaints  -AG      Existing Precautions/Restrictions  fall;seizures  -AG      Document Type  therapy note (daily note)  -AG      Mode of Treatment  physical therapy  -AG      Patient/Family Observations  pt. supine; alert and cooperative; expressive aphasia noted.   -AG      Recorded by [AG] Ellen Esparza, PT      Row Name 20 153             Cognition/Psychosocial- PT/OT    Affect/Mental Status (Cognitive)  WFL  -AG      Additional Documentation  -- expressive aphasia  -AG      Recorded by [AG] Ellen Esparza, PT      Row Name 20 153             Mobility    Extremity Weight-bearing Status  left lower extremity;right lower extremity  -AG      Left Lower Extremity (Weight-bearing Status)  full weight-bearing (FWB)  -AG      Right Lower Extremity (Weight-bearing Status)  full weight-bearing (FWB)  -AG      Recorded by [AG] Ellen Esparza, PT      Row Name 20 153             Bed Mobility Assessment/Treatment    Bed Mobility, Safety Issues  decreased use of arms for pushing/pulling;decreased use of legs for bridging/pushing  -AG      Assistive Device (Bed Mobility)  bed rails  -AG      Recorded by [AG] Ellen Esparza, PT      Row Name 20 153             Transfer Assessment/Treatment    Transfer Assessment/Treatment  sit-stand transfer;stand-sit transfer;stand pivot/stand step transfer  -AG      Recorded by [AG] Ellen Esparza, PT      Row Name 20 153             Bed-Chair Transfer    Bed-Chair [place]  (Transfers)  verbal cues;nonverbal cues (demo/gesture);contact guard  -AG      Assistive Device (Bed-Chair Transfers)  wheelchair  -AG      Recorded by [AG] Ellen Esparza, PT      Row Name 03/05/20 1531             Chair-Bed Transfer    Chair-Bed [place] (Transfers)  verbal cues;nonverbal cues (demo/gesture);contact guard  -AG      Assistive Device (Chair-Bed Transfers)  wheelchair  -AG      Recorded by [AG] Ellen Esparza, PT      Row Name 03/05/20 1531             Sit-Stand Transfer    Sit-Stand [place] (Transfers)  verbal cues;nonverbal cues (demo/gesture);contact guard  -AG      Assistive Device (Sit-Stand Transfers)  walker, front-wheeled  -AG      Recorded by [AG] Ellen Esparza, PT      Row Name 03/05/20 1531             Stand-Sit Transfer    Stand-Sit [place] (Transfers)  verbal cues;nonverbal cues (demo/gesture);contact guard  -AG      Assistive Device (Stand-Sit Transfers)  walker, front-wheeled  -AG      Recorded by [AG] Ellen Esparza, PT      Row Name 03/05/20 1531             Stand Pivot/Stand Step Transfer    Stand Pivot/Stand Step [place]  verbal cues;nonverbal cues (demo/gesture);contact guard  -AG      Assistive Device (Stand Pivot Stand Step Transfer)  walker, front-wheeled  -AG      Recorded by [AG] Ellen Esparza, PT      Row Name 03/05/20 1531             Gait/Stairs Assessment/Training    Gait/Stairs Assessment/Training  gait/ambulation independence;gait/ambulation assistive device;distance ambulated;gait pattern;gait deviations  -AG      [place] Level (Gait)  verbal cues;nonverbal cues (demo/gesture);contact guard  -AG      Assistive Device (Gait)  walker, front-wheeled  -AG      Distance in Feet (Gait)  300 x 2  -AG      Pattern (Gait)  step-through  -AG      Deviations/Abnormal Patterns (Gait)  dmitry decreased;stride length decreased  -AG      Bilateral Gait Deviations  forward flexed posture  -AG      Recorded by [AG] Ellen Esparza, PT      Row Name 03/05/20  1531             Safety Issues, Functional Mobility    Safety Issues Affecting Function (Mobility)  insight into deficits/self awareness;safety precaution awareness;safety precautions follow-through/compliance  -AG      Impairments Affecting Function (Mobility)  balance;coordination;endurance/activity tolerance;strength  -AG      Recorded by [AG] Ellen Esparza, PT      Row Name 03/05/20 1531             Vision Assessment/Intervention    Visual Impairment/Limitations  corrective lenses full time  -AG      Recorded by [AG] Ellen Esparza, PT      Row Name 03/05/20 1531             Pain Scale: FACES Pre/Post-Treatment    Pain: FACES Scale, Pretreatment  0-->no hurt  -AG      Pain: FACES Scale, Post-Treatment  0-->no hurt  -AG      Recorded by [AG] Ellen Esparza, PT      Row Name 03/05/20 1531             Balance    Balance  static sitting balance;static standing balance;dynamic sitting balance;dynamic standing balance;standing balance activity  -AG      Additional Documentation  Balance (Row)  -AG      Recorded by [AG] Ellen Esparza, PT      Row Name 03/05/20 1531             Static Sitting Balance    Level of [place] (Unsupported Sitting, Static Balance)  supervision  -AG      Sitting Position (Unsupported Sitting, Static Balance)  sitting on edge of bed;sitting in wheelchair  -AG      Time Able to Maintain Position (Unsupported Sitting, Static Balance)  more than 5 minutes  -AG      Recorded by [AG] Ellen Esparza, PT      Row Name 03/05/20 1531             Dynamic Sitting Balance    Level of [place], Reaches Outside Midline (Sitting, Dynamic Balance)  contact guard assist;standby assist  -AG      Recorded by [AG] Ellen Esparza, PT      Row Name 03/05/20 1531             Static Standing Balance    Level of [place] (Supported Standing, Static Balance)  contact guard assist;standby assist  -AG      Assistive Device Utilized (Supported Standing, Static Balance)  walker, rolling  -AG      Recorded by  [AG] Ellen Esparza, PT      Row Name 03/05/20 1531             Dynamic Standing Balance    Level of [place], Reaches Outside Midline (Standing, Dynamic Balance)  contact guard assist  -AG      Lower Extremity Device, Reaches Outside Midline (Standing, Dynamic Balance)  -- RW  -AG      Recorded by [AG] Ellen Esparza, PT      Row Name 03/05/20 1531             Standing Balance Activity    Activities Performed (Standing, Balance Training)  standing on foam roll  -AG      Support Needed for Balance (Standing, Balance Training)  uses at least one upper extremity for support;CGA  -AG      Recorded by [AG] Ellen Esparza, PT      Row Name 03/05/20 1531             Therapeutic Exercise    Therapeutic Exercise  seated, lower extremities;standing, lower extremities  -AG      Additional Documentation  Therapeutic Exercise (Row)  -AG      Recorded by [AG] Ellen Esparza, PT      Row Name 03/05/20 1531             Lower Extremity Standing Therapeutic Exercise    Performed, Standing Lower Extremity (Therapeutic Exercise)  hip abduction/adduction;hip/knee flexion/extension;shallow squats;heel raises  -AG      Device, Standing Lower Extremity (Therapeutic Exercise)  parallel bars;other (see comments) RW  -AG      Exercise Type, Standing Lower Extremity (Therapeutic Exercise)  AROM (active range of motion)  -AG      Expected Outcomes, Standing Lower Extremity (Therapeutic Exercise)  improve functional tolerance, community activity;improve functional tolerance, household activity;improve functional tolerance, self-care activity;improve performance, gait skills;improve performance, transfer skills;strengthen normal movement patterns;strengthen, facilitate independent active range of motion  -AG      Sets/Reps Detail, Standing Lower Extremity (Therapeutic Exercise)  1x 20  -AG      Comment, Standing Lower Extremity (Therapeutic Exercise)  frequent verbal cues for task initiation and completion  -AG      Recorded by [AG] Isaias  Ellen Farrar PT      Row Name 03/05/20 1531             Lower Extremity Seated Therapeutic Exercise    Performed, Seated Lower Extremity (Therapeutic Exercise)  hip flexion/extension;hip abduction/adduction;knee flexion/extension;ankle dorsiflexion/plantarflexion;LAQ (long arc quad), knee extension  -AG      Device, Seated Lower Extremity (Therapeutic Exercise)  free weights, cuff;small ball  -AG      Exercise Type, Seated Lower Extremity (Therapeutic Exercise)  AROM (active range of motion)  -AG      Expected Outcomes, Seated Lower Extremity (Therapeutic Exercise)  improve functional tolerance, community activity;improve functional tolerance, household activity;improve functional tolerance, self-care activity;improve performance, gait skills;improve performance, transfer skills;strengthen normal movement patterns;strengthen, facilitate independent active range of motion  -AG      Sets/Reps Detail, Seated Lower Extremity (Therapeutic Exercise)  2 x 20  -AG      Recorded by [AG] Ellen Esparza PT      Row Name 03/05/20 1531             Positioning and Restraints    Pre-Treatment Position  in bed  -AG      Post Treatment Position  bed  -AG      In Bed  notified nsg;supine;call light within reach;encouraged to call for assist;side rails up x3 following PM session  -AG      Recorded by [AG] Ellen Esparza PT      Row Name 03/05/20 1531             Daily Summary of Progress (PT)    Functional Goal Overall Progress: Physical Therapy  progressing toward functional goals as expected  -AG      Impairments Continuing to Limit Function: Physical Therapy  strength decreased;impaired balance;impaired communication;impaired functional activity tolerance  -AG      Recorded by [AG] Ellen Esparza, PT        User Key  (r) = Recorded By, (t) = Taken By, (c) = Cosigned By    Initials Name Effective Dates    Ellen Freedman PT 04/03/18 -                  PT Recommendation and Plan        Daily Summary of Progress (PT)  Functional Goal  Overall Progress: Physical Therapy: progressing toward functional goals as expected  Impairments Continuing to Limit Function: Physical Therapy: strength decreased, impaired balance, impaired communication, impaired functional activity tolerance               Time Calculation:     PT Charges     Row Name 03/05/20 1530 03/05/20 1527          Time Calculation    Start Time  1430  -AG  0920  -AG     Stop Time  1515  -AG  1005  -AG     Time Calculation (min)  45 min  -AG  45 min  -AG     PT Received On  --  03/05/20  -       User Key  (r) = Recorded By, (t) = Taken By, (c) = Cosigned By    Initials Name Provider Type    Ellen Freedman, PT Physical Therapist          Therapy Charges for Today     Code Description Service Date Service Provider Modifiers Qty    [id number] HC PT NEUROMUSC RE EDUCATION EA 15 MIN 3/5/2020 Ellen Esparza, PT GP 6                   Ellen Esparza PT  3/5/2020

## 2020-03-05 NOTE — SIGNIFICANT NOTE
03/05/20 1610   Plan   Plan Spoke to son Dilip [phone] who states he is agreeable to pt going to SNF for continued rehab and prefers [facility] if in-network with pt's insurance or [facility].  [phone] per Daija who states they are not in-network with [employer] replacement.  Ellen with [facility] is in-network and says to send referral.     Patient/Family in Agreement with Plan yes

## 2020-03-05 NOTE — THERAPY PROGRESS REPORT/RE-CERT
Inpatient Rehabilitation - Occupational Therapy Progress Note     Dominic     Patient Name: Connie Arango  : 1940  MRN: [id number]    Today's Date: 3/5/2020                 Admit Date: 2020      Visit Dx:  No diagnosis found.    Patient Active Problem List   Diagnosis   • ICH (intracerebral hemorrhage) (CMS/HCC)         Therapy Treatment    IRF Treatment Summary     Row Name 20 1648 20 1531          Evaluation/Treatment Time and Intent    Subjective Information  no complaints  -AB  no complaints  -AG     Existing Precautions/Restrictions  fall;seizures;other (see comments) aphasia  -AB  fall;seizures  -AG     Document Type  progress note/recertification;therapy note (daily note)  -AB  therapy note (daily note)  -AG     Mode of Treatment  occupational therapy  -AB  physical therapy  -AG     Patient/Family Observations  Pt. could only complete 25 mins OT tx this date d/t c/o of pain in R side/stomach w/ RN Linsey notified.   -AB  pt. supine; alert and cooperative; expressive aphasia noted.   -AG     Unable to Perform (Evaluation/Treatment)  -- pt. declined to complete tx session w/ NSG notified.  -AB  --     Recorded by [AB] Suha Marinelli, OT [AG] Ellen Esparza, PT     Row Name 20 1648 20 1531          Cognition/Psychosocial- PT/OT    Affect/Mental Status (Cognitive)  -- aphasia  -AB  WFL  -AG     Follows Commands (Cognition)  physical/tactile prompts required;verbal cues/prompting required;repetition of directions required;increased processing time needed  -AB  --     Personal Safety Interventions  fall prevention program maintained;gait belt;nonskid shoes/slippers when out of bed;supervised activity  -AB  --     Attention Deficit (Cognitive)  requires cues/redirection to task  -AB  --     Additional Documentation  --  -- expressive aphasia  -AG     Recorded by [AB] Suha Marinelli, OT [AG] Ellen Esparza, PT     Row Name 20 1531             Mobility     Extremity Weight-bearing Status  left lower extremity;right lower extremity  -AG      Left Lower Extremity (Weight-bearing Status)  full weight-bearing (FWB)  -AG      Right Lower Extremity (Weight-bearing Status)  full weight-bearing (FWB)  -AG      Recorded by [AG] Ellen Esparza, PT      Row Name 03/05/20 1531             Bed Mobility Assessment/Treatment    Bed Mobility, Safety Issues  decreased use of arms for pushing/pulling;decreased use of legs for bridging/pushing  -AG      Assistive Device (Bed Mobility)  bed rails  -AG      Recorded by [AG] Ellen Esparza, PT      Row Name 03/05/20 1531             Transfer Assessment/Treatment    Transfer Assessment/Treatment  sit-stand transfer;stand-sit transfer;stand pivot/stand step transfer  -AG      Recorded by [AG] Ellen Esparza, PT      Row Name 03/05/20 1531             Bed-Chair Transfer    Bed-Chair [place] (Transfers)  verbal cues;nonverbal cues (demo/gesture);contact guard  -AG      Assistive Device (Bed-Chair Transfers)  wheelchair  -AG      Recorded by [AG] Ellen Esparza, PT      Row Name 03/05/20 1648 03/05/20 1531          Chair-Bed Transfer    Chair-Bed [place] (Transfers)  contact guard;verbal cues  -AB  verbal cues;nonverbal cues (demo/gesture);contact guard  -AG     Assistive Device (Chair-Bed Transfers)  wheelchair  -AB  wheelchair  -AG     Recorded by [AB] Suha Marinelli OT [AG] Ellen Esparza, PT     Row Name 03/05/20 1531             Sit-Stand Transfer    Sit-Stand [place] (Transfers)  verbal cues;nonverbal cues (demo/gesture);contact guard  -AG      Assistive Device (Sit-Stand Transfers)  walker, front-wheeled  -AG      Recorded by [AG] Ellen Esparza, PT      Row Name 03/05/20 1531             Stand-Sit Transfer    Stand-Sit [place] (Transfers)  verbal cues;nonverbal cues (demo/gesture);contact guard  -AG      Assistive Device (Stand-Sit Transfers)  walker, front-wheeled  -AG      Recorded by [AG] Ellen Esparza,  PT      Row Name 03/05/20 1531             Stand Pivot/Stand Step Transfer    Stand Pivot/Stand Step [place]  verbal cues;nonverbal cues (demo/gesture);contact guard  -AG      Assistive Device (Stand Pivot Stand Step Transfer)  walker, front-wheeled  -AG      Recorded by [AG] Ellen Esparza, PT      Row Name 03/05/20 1531             Gait/Stairs Assessment/Training    Gait/Stairs Assessment/Training  gait/ambulation independence;gait/ambulation assistive device;distance ambulated;gait pattern;gait deviations  -AG      [place] Level (Gait)  verbal cues;nonverbal cues (demo/gesture);contact guard  -AG      Assistive Device (Gait)  walker, front-wheeled  -AG      Distance in Feet (Gait)  300 x 2  -AG      Pattern (Gait)  step-through  -AG      Deviations/Abnormal Patterns (Gait)  dmitry decreased;stride length decreased  -AG      Bilateral Gait Deviations  forward flexed posture  -AG      Recorded by [AG] Ellen Esparza, PT      Row Name 03/05/20 1531             Safety Issues, Functional Mobility    Safety Issues Affecting Function (Mobility)  insight into deficits/self awareness;safety precaution awareness;safety precautions follow-through/compliance  -AG      Impairments Affecting Function (Mobility)  balance;coordination;endurance/activity tolerance;strength  -AG      Recorded by [AG] Ellen Esparza, PT      Row Name 03/05/20 1648             Bathing Assessment/Treatment    Comment (Bathing)  ModA  -AB      Recorded by [AB] Suha Marinelli, OT      Row Name 03/05/20 1648             Upper Body Dressing Assessment/Treatment    Comment (Upper Body Dressing)  ModA/Sb  -AB      Recorded by [AB] Suha Marinelli, OT      Row Name 03/05/20 1648             Lower Body Dressing Assessment/Treatment    Comment (Lower Body Dressing)  MaxA/ModA  -AB      Recorded by [AB] Suha Marinelli, OT      Row Name 03/05/20 1648             Grooming Assessment/Treatment    Comment (Grooming)  Sb   -AB      Recorded by [AB] Suha Marinelli, OT      Row Name 03/05/20 1648             Toileting Assessment/Treatment    Comment (Toileting)  Sb  -AB      Recorded by [AB] Suha Marinelli, OT      Row Name 03/05/20 1648             Self-Feeding Assessment/Treatment    Comment (Self-Feeding)  Set-Up  -AB      Recorded by [AB] Suha Marinelli OT      Row Name 03/05/20 1648 03/05/20 1531          Vision Assessment/Intervention    Visual Impairment/Limitations  corrective lenses full time  -AB  corrective lenses full time  -AG     Recorded by [AB] Suha Marinelli, OT [AG] Ellen Esparza, PT     Row Name 03/05/20 1531             Pain Scale: FACES Pre/Post-Treatment    Pain: FACES Scale, Pretreatment  0-->no hurt  -AG      Pain: FACES Scale, Post-Treatment  0-->no hurt  -AG      Recorded by [AG] Ellen Esparza, PT      Row Name 03/05/20 1531             Balance    Additional Documentation  Balance (Row)  -AG      Recorded by [AG] Ellen Esparza, PT      Row Name 03/05/20 1531             Static Sitting Balance    Level of [place] (Unsupported Sitting, Static Balance)  supervision  -AG      Sitting Position (Unsupported Sitting, Static Balance)  sitting on edge of bed;sitting in wheelchair  -AG      Time Able to Maintain Position (Unsupported Sitting, Static Balance)  more than 5 minutes  -AG      Recorded by [AG] Ellen Esparza, PT      Row Name 03/05/20 1531             Dynamic Sitting Balance    Level of [place], Reaches Outside Midline (Sitting, Dynamic Balance)  contact guard assist;standby assist  -AG      Recorded by [AG] Ellen Esparza, PT      Row Name 03/05/20 1531             Static Standing Balance    Level of [place] (Supported Standing, Static Balance)  contact guard assist;standby assist  -AG      Assistive Device Utilized (Supported Standing, Static Balance)  walker, rolling  -AG      Recorded by [AG] Ellen Esparza, PT      Row Name 03/05/20 1531              Dynamic Standing Balance    Level of [place], Reaches Outside Midline (Standing, Dynamic Balance)  contact guard assist  -AG      Lower Extremity Device, Reaches Outside Midline (Standing, Dynamic Balance)  -- RW  -AG      Recorded by [AG] Ellen Esparza, PT      Row Name 03/05/20 1531             Standing Balance Activity    Activities Performed (Standing, Balance Training)  standing on foam roll  -AG      Support Needed for Balance (Standing, Balance Training)  uses at least one upper extremity for support;CGA  -AG      Recorded by [AG] Ellen Esparza, PT      Row Name 03/05/20 1531             Therapeutic Exercise    Therapeutic Exercise  seated, lower extremities;standing, lower extremities  -AG      Additional Documentation  Therapeutic Exercise (Row)  -AG      Recorded by [AG] Ellen Esparza, PT      Row Name 03/05/20 1648             Upper Extremity Seated Therapeutic Exercise    Device, Seated Upper Extremity (Therapeutic Exercise)  restorator/arm bike [id number], 0 resistance  -AB      Exercise Type, Seated Upper Extremity (Therapeutic Exercise)  AROM (active range of motion) BUE G/FMC TherEx/Act; strengthening  -AB      Expected Outcomes, Seated Upper Extremity (Therapeutic Exercise)  improve functional tolerance, self-care activity;improve performance, BADLs;improve performance, transfer skills;improve performance, fine motor coordination skills;improve performance, gross motor coordination skills  -AB      Recorded by [AB] Suha Marinelli OT      Row Name 03/05/20 1531             Lower Extremity Standing Therapeutic Exercise    Performed, Standing Lower Extremity (Therapeutic Exercise)  hip abduction/adduction;hip/knee flexion/extension;shallow squats;heel raises  -AG      Device, Standing Lower Extremity (Therapeutic Exercise)  parallel bars;other (see comments) RW  -AG      Exercise Type, Standing Lower Extremity (Therapeutic Exercise)  AROM (active range of motion)  -AG      Expected  Outcomes, Standing Lower Extremity (Therapeutic Exercise)  improve functional tolerance, community activity;improve functional tolerance, household activity;improve functional tolerance, self-care activity;improve performance, gait skills;improve performance, transfer skills;strengthen normal movement patterns;strengthen, facilitate independent active range of motion  -AG      Sets/Reps Detail, Standing Lower Extremity (Therapeutic Exercise)  1x 20  -AG      Comment, Standing Lower Extremity (Therapeutic Exercise)  frequent verbal cues for task initiation and completion  -AG      Recorded by [AG] Ellen Esparza, PT      Row Name 03/05/20 1531             Lower Extremity Seated Therapeutic Exercise    Performed, Seated Lower Extremity (Therapeutic Exercise)  hip flexion/extension;hip abduction/adduction;knee flexion/extension;ankle dorsiflexion/plantarflexion;LAQ (long arc quad), knee extension  -AG      Device, Seated Lower Extremity (Therapeutic Exercise)  free weights, cuff;small ball  -AG      Exercise Type, Seated Lower Extremity (Therapeutic Exercise)  AROM (active range of motion)  -AG      Expected Outcomes, Seated Lower Extremity (Therapeutic Exercise)  improve functional tolerance, community activity;improve functional tolerance, household activity;improve functional tolerance, self-care activity;improve performance, gait skills;improve performance, transfer skills;strengthen normal movement patterns;strengthen, facilitate independent active range of motion  -AG      Sets/Reps Detail, Seated Lower Extremity (Therapeutic Exercise)  2 x 20  -AG      Recorded by [AG] Ellen Esparza, ALAN      Row Name 03/05/20 1648 03/05/20 1531          Positioning and Restraints    Pre-Treatment Position  --  in bed  -AG     Post Treatment Position  bed  -AB  bed  -AG     In Bed  call light within reach;encouraged to call for assist;exit alarm on;side rails up x2;side lying right  -AB  notified nsg;supine;call light within  reach;encouraged to call for assist;side rails up x3 following PM session  -AG     Recorded by [AB] Suha Marinelli, OT [AG] Ellen Esparza, PT     Row Name 03/05/20 1531             Daily Summary of Progress (PT)    Functional Goal Overall Progress: Physical Therapy  progressing toward functional goals as expected  -AG      Impairments Continuing to Limit Function: Physical Therapy  strength decreased;impaired balance;impaired communication;impaired functional activity tolerance  -AG      Recorded by [AG] Ellen Esparza, PT        User Key  (r) = Recorded By, (t) = Taken By, (c) = Cosigned By    Initials Name Effective Dates    AB Suha Marinelli, OT 04/03/18 -     AG Ellen Esparza, PT 04/03/18 -                  OT Recommendation and Plan                 OT IRF GOALS     Row Name 03/05/20 1700 03/05/20 1659 02/27/20 1523       Bathing Goal 1 (OT-IRF)    [place] Level (Bathing Goal 1, OT-IRF)  --  --  moderate assist (50-74% patient effort)  -CJ    Time Frame (Bathing Goal 1, OT-IRF)  --  --  long term goal (LTG);by discharge  -CJ       LB Dressing Goal 1 (OT-IRF)    [place] (LB Dressing Goal 1, OT-IRF)  --  --  moderate assist (50-74% patient effort)  -CJ    Time Frame (LB Dressing Goal 1, OT-IRF)  --  --  short term goal (STG)  -CJ    Progress/Outcomes (LB Dressing Goal 1, OT-IRF)  --  goal ongoing  -AB  --       LB Dressing Goal 2 (OT-IRF)    [place] (LB Dressing Goal 2, OT-IRF)  --  --  minimum assist (75% or more patient effort)  -CJ    Time Frame (LB Dressing Goal 2, OT-IRF)  --  --  long term goal (LTG);by discharge  -CJ       Toileting Goal 1 (OT-IRF)    [place] Level (Toileting Goal 1, OT-IRF)  minimum assist (75% or more patient effort);contact guard assist  -AB  --  maximum assist (25-49% patient effort)  -CJ    Time Frame (Toileting Goal 1, OT-IRF)  short term goal (STG);5 - 7 days  -AB  --  short term goal (STG)  -CJ    Progress/Outcomes (Toileting Goal 1, OT-IRF)   --  goal met  -AB  --       Toileting Goal 2 (OT-IRF)    [place] Level (Toileting Goal 2, OT-IRF)  --  --  moderate assist (50-74% patient effort)  -    Time Frame (Toileting Goal 2, OT-IRF)  --  --  long term goal (LTG);by discharge  -      User Key  (r) = Recorded By, (t) = Taken By, (c) = Cosigned By    Initials Name Provider Type    Suha Vincent, OT Occupational Therapist    Daylin Beckman, OT Occupational Therapist                     Time Calculation:     Time Calculation- OT     Row Name 03/05/20 1648             Time Calculation- OT    OT Start Time  1240  -AB      OT Stop Time  1305  -AB      OT Time Calculation (min)  25 min  -AB      Total Timed Code Minutes- OT  25 minute(s)  -AB      OT Non-Billable Time (min)  15 min  -AB        User Key  (r) = Recorded By, (t) = Taken By, (c) = Cosigned By    Initials Name Provider Type    Suha Vincent, OT Occupational Therapist          Therapy Charges for Today     Code Description Service Date Service Provider Modifiers Qty    17909993232 HC OT NEUROMUSC RE EDUCATION EA 15 MIN 3/4/2020 Suha Marinelli, OT GO 2    82653422294 HC OT SELF CARE/MGMT/TRAIN EA 15 MIN 3/4/2020 Suha Marinelli, OT GO 2    84834041743 HC OT THER PROC EA 15 MIN 3/4/2020 Suha Marinelli, OT GO 1    27176040840 HC OT NEUROMUSC RE EDUCATION EA 15 MIN 3/5/2020 Suha Marinelli, OT GO 1    96829492210 HC OT SELF CARE/MGMT/TRAIN EA 15 MIN 3/5/2020 Suha Marinelli, OT GO 1                   Suha Marinelli OT  3/5/2020

## 2020-03-05 NOTE — DISCHARGE PLACEMENT REQUEST
"Les Arango (79 y.o. Female)     Date of Birth Social Security Number Address Home Phone MRN    1940  [address] [place] [address] [phone] [id number]    Holiness Marital Status          Unknown        Admission Date Admission Type Admitting Provider Attending Provider Department, Room/Bed    2/26/20 Elective Sean Coppola MD Kfoury, Wajdi Samir, MD [facility], 105/2S    Discharge Date Discharge Disposition Discharge Destination                       Attending Provider:  Sean Coppola MD    Allergies:  No Known Allergies    Isolation:  None   Infection:  None   Code Status:  CPR    Ht:  165 cm (64.96\")   Wt:  72 kg (158 lb 11.7 oz)    Admission Cmt:  None   Principal Problem:  None                Active Insurance as of 2/26/2020     Primary Coverage     Payor Plan Insurance Group Employer/Plan Group    HUMANA MEDICARE REPLACEMENT HUMANA MEDICARE REPLACEMENT [id number]     Payor Plan Address Payor Plan Phone Number Payor Plan Fax Number Effective Dates    [address] [address] [phone]  1/1/2018 - None Entered    [facility] [address]       Subscriber Name Subscriber Birth Date Member ID       LES ARANGO 1940 [id number]                 Emergency Contacts      (Rel.) Home Phone Work Phone Mobile Phone    Brennen Sargent (Son) [phone] -- --    JETHRO REYES (Sister) [phone] -- --    JERRI REYES (Brother) [phone] -- --            Emergency Contact Information     Name Relation Home Work Mobile    Brennen Sargent Son [phone]      JETHRO REYES Sister [phone]      JERRI REYES Brother [phone]            Insurance Information                HUMANA MEDICARE REPLACEMENT/HUMANA MEDICARE REPLACEMENT Phone: [phone]    Subscriber: Les Arango Subscriber#: [id number]    Group#: [id number] Precert#:              History & Physical      Sean Coppola MD at 02/27/20 0730                   HISTORY AND " PHYSICAL        Patient Identification:  Name:  Connie Arango  Age:  79 y.o.  Sex:  female  :  1940  MRN:  [id number]   Visit Number:  [id number]  Primary Care Physician:  Provider, No Known       Subjective     Subjective     Chief complaint:     Debility    History of presenting illness:     This patient is seen today by me for post admission physician evaluation to the inpatient rehabilitation facility.    Connie Arango is an 79 year old female with history of hypertension and hyperlipidemia who presented to [facility] ([facility]) as a direct transfer from and OSH w/slurred speech and aphasia. Her LKN was the afternoon of 2020. The patient lives alone and last spoke with her siblings on  and was in her normal state of health. On  when her siblings called they found her to have slurred speech and incorrect words. She was taken to OSH where a left parietotemporal ICH was demonstrated on imaging.  On arrival to [facility] her SBP was 198 mmHg and she was on a nicardipine gtt.   She was admitted to Neuro service patient was placed in 4 point restraints due to agitation and intubated for protection of airway and for patient safety, and arterial line was placed.  Prior to intubation the patient was agitated and repeatedly tried to crawl out of hospital bed.  The etiology of ICH was suspected to be secondary to HTN.  CT reveals “intraparenchymal hemorrhage is seen and left parietotemporal region with surrounding soft tissue edema. It measures 4.3 x 2.8 x 2.3 centimeters.”  On 02/10 labs included sodium 141, K+ 4.0, WBC 14.47, Hb: 13.6, HCT: 41.4, Plt: 235 PT: 12.8, INR: 1.0, and was ordered SCDS for DVT ppx at that time.  MRI head demonstrated no significant change in size of the left temporoparietal intraparenchymal hematoma, restricted diffusion within the brain parenchyma adjacent to the hematoma, particularly posteriorly, consistent with acute infarction, and additional small  acute cortical ischemic infarcts in the left parietal lobe and right parieto-occipital region.  Patients ECHO EF 60-70% mild TR, RVSP 40-50.  CTA with possible small aneurysms at basilar tip and left ACOMM, right SCA severe stenosis, neurosurgery was consulted with no recs for surgical intervention.  Mechanism ruled likely cardioembolic with hemorrhagic conversion at left frontotemporal areas as multifocal CVAs evident on MRI are consistent with embolic etiology. Dysphagia team recommended tube feeds, and  Hal was placed.  Patient was extubated onto HFNC on .  On , patient became acutely hypotensive after getting PM meds. She had a low grade temp so she was given 1L IVF, pan cultured, started on broad spectrum antibiotics, and phenylephrine drip for a short time.  Patient was started on Vanc/Cefe/Flagyl initially and then follow cultures and if no growth plan to discontinue antibiotics on day 3.  Later in the shift her hypotensive episode resolved and early the next morning she had increasing hypoxia and agitation.  She was restarted on a precedex drip for agitation and given Lasix.  Patient placed on subcutaneous Heparin for DVT prophylaxis and 81 mg ASA daily.  On , patient weaned off of HFNC and was doing well on room air.  She is alert but remains aphasic, antibiotics were discontinued as no fever, no growth on cultures, and clinical improvement.  For intermittent agitation, she was continued on Seroquel.  CXR showed small bilateral pleural effusions which are stable, moderate on the right, small-to-moderate on the left, Prominence of central vascular pedicle, and Slightly improved right lung aeration, in part due to redistribution of layering right sided pleural effusion.  On  was initial speech evaluation: patient alert, restless, bilateral upper extremity restraints in place. DHT and HFNC in place. Patient with significant communication deficits. Receptively, she followed 2 commands,  was unable to reliably answer yes/no question (yes bias noted) unable to ID objects from a field of 2.  Expressively she was unable to complete automatic speech task or repeat.  Speech re-evaluated patient on 02/20 with diet recs puree with thin liquids, meds crushed in puree/pudding.  Feeding tube pulled and patient no longer requiring bilateral restraints.   BP improved.  Patient still noted with global aphasia.  Patient was noted to have poor po intake and was started on mirtazapine.  Labs on 02/23 included WBC 13.37, hgb 12.7, hct 39.4, Plt 415, creatinine 0.83, BUN 22, potassium 4.3, and sodium 140.      Patient continued to progress medically and PT/OT/speech evals completed which recommended acute inpatient rehab program post multifocal CVAs/left ICH for neuro and functional mobility, re-education with feeding and thought process and mobility needs. Acute inpatient rehab referral ordered for continued medical monitoring and intervention with post-acute multifocal CVAs/left ICH, continued neuro monitoring for changes or comps, anticoagulant therapy regulation, pain management needs, bowel and bladder management, dysphagia monitoring and intervention along with supervised meals and aspiration precautions, skin monitoring and breakdown prevention along with management of ongoing comorbidities that require intervention for regulation.    The preadmission mini-FIM score as assessed by the referring facility is as follows: Eating 2, Grooming 3, Bathing 2, Dressing-Upper Body 1, Dressing-Lower Body 1, Toileting 1, Transfers 1, Locomotion 1, Bowel Management 1, Bladder Management 1, Comprehension 2, Expression 2, Social Interaction 3, Memory 1, and Problem Solving 1.     Estimated length of stay is 10-14 days.     Patient is going to require intensive inpatient physical therapy for therapeutic exercises, range of motion, endurance, gait training, safety, stairs training, strengthening for at least 1 to 2 hours a day  for 5 to 6 days a week as well as occupational therapy for dressing, ADLs, feeding, home skills, safety and toileting techniques for 1 to 2 hours a day for 5 to 6 days a week, as well as speech and language pathology therapy for communication, expression, dysphasia, aspiration needs for 30 to 90 minutes a day for 5 to 6 days a week.      ---------------------------------------------------------------------------------------------------------------------     Review Of Systems:    Constitutional: no fever, chills and night sweats.  Fairly confused.  Eyes: no eye drainage, itching or redness.  HEENT: no mouth sores, dysphagia or nose bleed.  Respiratory: no for shortness of breath, cough or production of sputum.  Cardiovascular: no chest pain, no palpitations, no orthopnea.  Gastrointestinal: no nausea, vomiting or diarrhea. No abdominal pain, hematemesis or rectal bleeding.  Genitourinary: no dysuria or polyuria.  Hematologic/lymphatic: no lymph node abnormalities, no easy bruising or easy bleeding.  Musculoskeletal: no muscle or joint pain.  Skin: No rash and no itching.  Neurological: no loss of consciousness, no seizure, no headache.  Behavioral/Psych: no depression or suicidal ideation.  Endocrine: no hot flashes.  Immunologic: negative.    ---------------------------------------------------------------------------------------------------------------------     Past Medical History    Past Medical History:   Diagnosis Date   • Coronary artery disease    • GERD (gastroesophageal reflux disease)    • Hypertension    • Intracranial hemorrhage (CMS/HCC)        Past Surgical History    Past Surgical History:   Procedure Laterality Date   • CHOLECYSTECTOMY         Family History    History reviewed. No pertinent family history.    Social History    Social History     Tobacco Use   • Smoking status: Never Smoker   • Smokeless tobacco: Never Used   Substance Use Topics   • Alcohol use: Not Currently   • Drug use: Never            Allergies    Patient has no known allergies.  ---------------------------------------------------------------------------------------------------------------------     Home Medications:    Prior to Admission Medications     Prescriptions Last Dose Informant Patient Reported? Taking?    furosemide (LASIX) 20 MG tablet Unknown Pharmacy Yes No    Take 20 mg by mouth Daily.    imipramine (TOFRANIL) 10 MG tablet Unknown Pharmacy Yes No    Take 10 mg by mouth Every Morning.    metoprolol succinate XL (TOPROL-XL) 50 MG 24 hr tablet Unknown Pharmacy Yes No    Take 50 mg by mouth 2 (Two) Times a Day.    potassium chloride (K-DUR) 10 MEQ CR tablet Unknown Pharmacy Yes No    Take 10 mEq by mouth Daily.    raNITIdine (ZANTAC) 300 MG tablet Unknown Pharmacy Yes No    Take 300 mg by mouth Every Night.    simvastatin (ZOCOR) 20 MG tablet Unknown Pharmacy Yes No    Take 20 mg by mouth Every Night.        ---------------------------------------------------------------------------------------------------------------------    Objective     Objective     Hospital Scheduled Meds:    aspirin 81 mg Oral Daily   atorvastatin 40 mg Oral Nightly   carvedilol 12.5 mg Oral BID With Meals   famotidine 40 mg Oral Daily   furosemide 20 mg Oral Daily   imipramine 10 mg Oral Nightly   magnesium oxide 400 mg Oral Daily   potassium chloride 10 mEq Oral Daily        ---------------------------------------------------------------------------------------------------------------------   Vital Signs:  Temp:  [98.9 °F (37.2 °C)-99.3 °F (37.4 °C)] 98.9 °F (37.2 °C)  Heart Rate:  [83-94] 94  Resp:  [18-20] 20  BP: (143-154)/(62-72) 143/62  No data found.  SpO2 Percentage    02/26/20 1632 02/26/20 1910   SpO2: 93% 94%     SpO2:  [93 %-94 %] 94 %  on   ;   Device (Oxygen Therapy): room air    Body mass index is 26.45 kg/m².  Wt Readings from Last 3 Encounters:   02/26/20 72 kg (158 lb 11.7 oz)      ---------------------------------------------------------------------------------------------------------------------     Physical Exam:    General Appearance: alert and pleasant but seems to be fairly confused and at times not following commands.    Head: normocephalic, without obvious abnormality and atraumatic    Eyes: lids and lashes normal, conjunctivae and sclerae normal, no icterus, no  pallor, corneas clear and PERRLA    Ears: ears appear intact with no abnormalities noted    Nose: nares normal, septum midline, mucosa normal and no drainage     Throat: no oral lesions, no thrush, oral mucosa moist and oopharynx normal    Neck: no adenopathy, supple, trachea midline, no thyromegaly, no carotid bruit  and no JVD    Back: no kyphosis present, no scoliosis present, no skin lesions, erythema, or  scars, no tenderness to percussion or palpation and range of motion normal    Lungs: clear to auscultation, respirations regular, respirations even and  respirations unlabored. No accessory muscle use.     Heart:: regular rhythm & normal rate, normal S1, S2, no murmur, no gallop, no  rub and no click.  Chest wall with no abnormalities observed. PMI nondisplaced    Abdomen: normal bowel sounds in all quadrants, no masses, no hepatomegaly,  no splenomegaly, soft non-tender, no guarding and no rebound tenderness    Extremities: no edema, no cyanosis, no redness, no tenderness, no clubbing    Musculoskeletal: joints with no effusion nor erythema nor warmth.  Pedal pulses palpable and equal bilaterally    Skin: no bleeding, bruising or rash and no lesions noted    Lymph Nodes: no palpable adenopathy    Neurologic: Alert and awake.  Mild aphasia with limited vocabulary and answers at this time.   Sensory intact in BLE and BUE.   Motor strength Generalized weakness      ---------------------------------------------------------------------------------------------------------------------             Results from last 7 days   Lab  Units 02/27/20  0657   WBC 10*3/mm3 6.26   HEMOGLOBIN g/dL 12.5   HEMATOCRIT % 40.2   MCV fL 89.3   MCHC g/dL 31.1*   PLATELETS 10*3/mm3 382         Invalid input(s): PROTCrCl cannot be calculated (No successful lab value found.).  No results found for: AMMONIA    No results found for: HGBA1C, POCGLU  No results found for: HGBA1C  No results found for: TSH, FREET4    No results found for: BLOODCX  No results found for: URINECX  No results found for: WOUNDCX  No results found for: STOOLCX  No results found for: RESPCX  Pain Management Panel     There is no flowsheet data to display.        I have personally reviewed the above laboratory results.   ---------------------------------------------------------------------------------------------------------------------  Imaging Results (Last 7 Days)     ** No results found for the last 168 hours. **        I have personally reviewed the above radiology results.   ---------------------------------------------------------------------------------------------------------------------      Assessment & Plan        Assessment/Plan       ASSESSMENT:    Multifocal CVA/left parietotemporal ICH   Small acute corical ischemic infarcts in the left parietal Lobe and right parieto-occipital region  Left parietotemporal hemorrhage  Encephalopathy  Acute hypoxia respiratory failure  HTN  Hypotension  Small bilateral pleural effusions  Dysphagia  Global aphasia     PLAN:    This patient is seen today by me for post admission physician evaluation to the inpatient rehabilitation facility.    Connie Arango is an 79 year old female with history of hypertension and hyperlipidemia who presented to [facility] ([facility]) as a direct transfer from and OS w/slurred speech and aphasia. Her LKN was the afternoon of 02/08/2020. The patient lives alone and last spoke with her siblings on 02/08 and was in her normal state of health. On 02/09 when her siblings called they found her  to have slurred speech and incorrect words. She was taken to OSH where a left parietotemporal ICH was demonstrated on imaging.  On arrival to [facility] her SBP was 198 mmHg and she was on a nicardipine gtt.   She was admitted to Neuro service patient was placed in 4 point restraints due to agitation and intubated for protection of airway and for patient safety, and arterial line was placed.  Prior to intubation the patient was agitated and repeatedly tried to crawl out of hospital bed.  The etiology of ICH was suspected to be secondary to HTN.  CT reveals “intraparenchymal hemorrhage is seen and left parietotemporal region with surrounding soft tissue edema. It measures 4.3 x 2.8 x 2.3 centimeters.”  On 02/10 labs included sodium 141, K+ 4.0, WBC 14.47, Hb: 13.6, HCT: 41.4, Plt: 235 PT: 12.8, INR: 1.0, and was ordered SCDS for DVT ppx at that time.  MRI head demonstrated no significant change in size of the left temporoparietal intraparenchymal hematoma, restricted diffusion within the brain parenchyma adjacent to the hematoma, particularly posteriorly, consistent with acute infarction, and additional small acute cortical ischemic infarcts in the left parietal lobe and right parieto-occipital region.  Patients ECHO EF 60-70% mild TR, RVSP 40-50.  CTA with possible small aneurysms at basilar tip and left ACOMM, right SCA severe stenosis, neurosurgery was consulted with no recs for surgical intervention.  Mechanism ruled likely cardioembolic with hemorrhagic conversion at left frontotemporal areas as multifocal CVAs evident on MRI are consistent with embolic etiology. Dysphagia team recommended tube feeds, and  Hal was placed.  Patient was extubated onto NC on .  On , patient became acutely hypotensive after getting PM meds. She had a low grade temp so she was given 1L IVF, pan cultured, started on broad spectrum antibiotics, and phenylephrine drip for a short time.  Patient was started on Vanc/Cefe/Flagyl  initially and then follow cultures and if no growth plan to discontinue antibiotics on day 3.  Later in the shift her hypotensive episode resolved and early the next morning she had increasing hypoxia and agitation.  She was restarted on a precedex drip for agitation and given Lasix.  Patient placed on subcutaneous Heparin for DVT prophylaxis and 81 mg ASA daily.  On 02/14, patient weaned off of HFNC and was doing well on room air.  She is alert but remains aphasic, antibiotics were discontinued as no fever, no growth on cultures, and clinical improvement.  For intermittent agitation, she was continued on Seroquel.  CXR showed small bilateral pleural effusions which are stable, moderate on the right, small-to-moderate on the left, Prominence of central vascular pedicle, and Slightly improved right lung aeration, in part due to redistribution of layering right sided pleural effusion.  On 02/13 was initial speech evaluation: patient alert, restless, bilateral upper extremity restraints in place. DHT and HFNC in place. Patient with significant communication deficits. Receptively, she followed 2 commands, was unable to reliably answer yes/no question (yes bias noted) unable to ID objects from a field of 2.  Expressively she was unable to complete automatic speech task or repeat.  Speech re-evaluated patient on 02/20 with diet recs puree with thin liquids, meds crushed in puree/pudding.  Feeding tube pulled and patient no longer requiring bilateral restraints.   BP improved.  Patient still noted with global aphasia.  Patient was noted to have poor po intake and was started on mirtazapine.  Labs on 02/23 included WBC 13.37, hgb 12.7, hct 39.4, Plt 415, creatinine 0.83, BUN 22, potassium 4.3, and sodium 140.      Patient continued to progress medically and PT/OT/speech evals completed which recommended acute inpatient rehab program post multifocal CVAs/left ICH for neuro and functional mobility, re-education with feeding  and thought process and mobility needs. Acute inpatient rehab referral ordered for continued medical monitoring and intervention with post-acute multifocal CVAs/left ICH, continued neuro monitoring for changes or comps, anticoagulant therapy regulation, pain management needs, bowel and bladder management, dysphagia monitoring and intervention along with supervised meals and aspiration precautions, skin monitoring and breakdown prevention along with management of ongoing comorbidities that require intervention for regulation.      The preadmission mini-FIM score as assessed by the referring facility is as follows: Eating 2, Grooming 3, Bathing 2, Dressing-Upper Body 1, Dressing-Lower Body 1, Toileting 1, Transfers 1, Locomotion 1, Bowel Management 1, Bladder Management 1, Comprehension 2, Expression 2, Social Interaction 3, Memory 1, and Problem Solving 1.     Estimated length of stay is 10-14 days.       Patient is going to require intensive inpatient physical therapy for therapeutic exercises, range of motion, endurance, gait training, safety, stairs training, strengthening for at least 1 to 2 hours a day for 5 to 6 days a week as well as occupational therapy for dressing, ADLs, feeding, home skills, safety and toileting techniques for 1 to 2 hours a day for 5 to 6 days a week, as well as speech and language pathology therapy for communication, expression, dysphasia, aspiration needs for 30 to 90 minutes a day for 5 to 6 days a week.    Patient's findings and recommendations were discussed with patient himself, referring facility, intake nurse and nursing staff.      Code Status:   Code Status and Medical Interventions:   Ordered at: 02/26/20 1948     Code Status:    CPR     Medical Interventions (Level of Support Prior to Arrest):    Full         Sean Coppola MD  02/27/20  7:33 AM          Electronically signed by Sean Coppola MD at 02/27/20 0738         Current Facility-Administered Medications      Medication Dose Route Frequency Provider Last Rate Last Dose   • acetaminophen (TYLENOL) tablet 650 mg  650 mg Oral Q4H PRN Sean Coppola MD       • aspirin chewable tablet 81 mg  81 mg Oral Daily Sean Coppola MD   81 mg at 03/05/20 1029   • atorvastatin (LIPITOR) tablet 40 mg  40 mg Oral Nightly Sean Coppola MD   40 mg at 03/04/20 2221   • bisacodyl (DULCOLAX) EC tablet 5 mg  5 mg Oral Daily PRN Sean Coppola MD   5 mg at 02/29/20 0547   • calcium carbonate (TUMS) chewable tablet 500 mg (200 mg elemental)  1 tablet Oral BID PRN Sean Coppola MD       • carvedilol (COREG) tablet 12.5 mg  12.5 mg Oral BID With Meals Sean Coppola MD   12.5 mg at 03/05/20 1029   • famotidine (PEPCID) tablet 40 mg  40 mg Oral Daily Sean Coppola MD   40 mg at 03/05/20 1029   • furosemide (LASIX) tablet 20 mg  20 mg Oral Daily Sean Coppola MD   20 mg at 03/05/20 1030   • imipramine (TOFRANIL) tablet 10 mg  10 mg Oral Nightly Sean Coppola MD   10 mg at 03/04/20 2221   • magnesium hydroxide (MILK OF MAGNESIA) suspension 2400 mg/10mL 10 mL  10 mL Oral Daily PRN Sean Coppola MD   10 mL at 03/04/20 0452   • magnesium oxide (MAG-OX) tablet 400 mg  400 mg Oral Daily Sean Coppola MD   400 mg at 03/05/20 1030   • ondansetron (ZOFRAN) tablet 4 mg  4 mg Oral Q6H PRN Sean Coppola MD       • polyethylene glycol 3350 powder (packet)  17 g Oral Daily PRN Sean Coppola MD   17 g at 03/03/20 1336   • potassium chloride (K-DUR,KLOR-CON) ER tablet 10 mEq  10 mEq Oral Daily Sean Coppola MD   10 mEq at 03/05/20 1029     Lab Results (last 72 hours)     ** No results found for the last 72 hours. **        Imaging Results (Most Recent)     Procedure Component Value Units Date/Time    FL Video Swallow Single Contrast [[id number]] Collected:  02/27/20 0952     Updated:  02/27/20 0954    Narrative:       EXAMINATION: FL VIDEO SWALLOW SINGLE-CONTRAST-       CLINICAL INDICATION:     dysphagia     TECHNIQUE: Modified barium swallow was performed utilizing video  fluoroscopy in conjunction with the Speech Pathology team. The patient  ingested multiple barium media including thin barium, nectar, and honey  liquid as well as barium pudding and a barium pudding coated cracker.      FLUOROSCOPY TIME: 1.88 minutes     COMPARISON: NONE      FINDINGS:   Premature loss is noted with vallecular pooling.  No episodes of aspiration or penetration identified with all tested  consistencies.          Impression:       1.  No aspiration.  2. Please see dysphasia notes for further details.     This report was finalized on 2020 9:52 AM by Dr. Westley Castillo MD.              Physician Progress Notes (most recent note)      Progress Notes filed by Provider, No Known at 20 [phone]        Occupational Therapy:    Physical Therapy:    Speech Language Pathology: Individual: 50 minutes.    Signed by: Alexus Yanes Speech therapist      Electronically signed by Interface, Transcription Incoming at 20 [phone]       Consult Notes (most recent note)    No notes of this type exist for this encounter.         Nutrition Notes (most recent note)    No notes exist for this encounter.            Physical Therapy Notes (most recent note)      Ellen Esparza, PT at 20 8312  Version 1 of 1         Inpatient Rehabilitation - Physical Therapy Treatment Note  [facility]     Patient Name: Connie Arango  : 1940  MRN: [id number]    Today's Date: 3/5/2020                 Admit Date: 2020      Visit Dx:    No diagnosis found.    Patient Active Problem List   Diagnosis   • ICH (intracerebral hemorrhage) (CMS/HCC)       Therapy Treatment    IRF Treatment Summary     Row Name 20 1531             Evaluation/Treatment Time and Intent    Subjective Information  no complaints  -AG      Existing Precautions/Restrictions  fall;seizures  -AG      Document Type  therapy note (daily note)   -AG      Mode of Treatment  physical therapy  -AG      Patient/Family Observations  pt. supine; alert and cooperative; expressive aphasia noted.   -AG      Recorded by [AG] Ellen Esparza, PT      Row Name 03/05/20 1531             Cognition/Psychosocial- PT/OT    Affect/Mental Status (Cognitive)  WFL  -AG      Additional Documentation  -- expressive aphasia  -AG      Recorded by [AG] Ellen Esparza, PT      Row Name 03/05/20 1531             Mobility    Extremity Weight-bearing Status  left lower extremity;right lower extremity  -AG      Left Lower Extremity (Weight-bearing Status)  full weight-bearing (FWB)  -AG      Right Lower Extremity (Weight-bearing Status)  full weight-bearing (FWB)  -AG      Recorded by [AG] Ellen Esparza, PT      Row Name 03/05/20 1531             Bed Mobility Assessment/Treatment    Bed Mobility, Safety Issues  decreased use of arms for pushing/pulling;decreased use of legs for bridging/pushing  -AG      Assistive Device (Bed Mobility)  bed rails  -AG      Recorded by [AG] Ellen Esparza, PT      Row Name 03/05/20 1531             Transfer Assessment/Treatment    Transfer Assessment/Treatment  sit-stand transfer;stand-sit transfer;stand pivot/stand step transfer  -AG      Recorded by [AG] Ellen Esparza, PT      Row Name 03/05/20 1531             Bed-Chair Transfer    Bed-Chair [place] (Transfers)  verbal cues;nonverbal cues (demo/gesture);contact guard  -AG      Assistive Device (Bed-Chair Transfers)  wheelchair  -AG      Recorded by [AG] Ellen Esparza, PT      Row Name 03/05/20 1531             Chair-Bed Transfer    Chair-Bed [place] (Transfers)  verbal cues;nonverbal cues (demo/gesture);contact guard  -AG      Assistive Device (Chair-Bed Transfers)  wheelchair  -AG      Recorded by [AG] Ellen Esparza, PT      Row Name 03/05/20 1531             Sit-Stand Transfer    Sit-Stand [place] (Transfers)  verbal cues;nonverbal cues (demo/gesture);contact guard  -AG       Assistive Device (Sit-Stand Transfers)  walker, front-wheeled  -AG      Recorded by [AG] Ellen Esparza, PT      Row Name 03/05/20 1531             Stand-Sit Transfer    Stand-Sit [place] (Transfers)  verbal cues;nonverbal cues (demo/gesture);contact guard  -AG      Assistive Device (Stand-Sit Transfers)  walker, front-wheeled  -AG      Recorded by [AG] Ellen Esparza, PT      Row Name 03/05/20 1531             Stand Pivot/Stand Step Transfer    Stand Pivot/Stand Step [place]  verbal cues;nonverbal cues (demo/gesture);contact guard  -AG      Assistive Device (Stand Pivot Stand Step Transfer)  walker, front-wheeled  -AG      Recorded by [AG] Ellen Esparza, PT      Row Name 03/05/20 1531             Gait/Stairs Assessment/Training    Gait/Stairs Assessment/Training  gait/ambulation independence;gait/ambulation assistive device;distance ambulated;gait pattern;gait deviations  -AG      [place] Level (Gait)  verbal cues;nonverbal cues (demo/gesture);contact guard  -AG      Assistive Device (Gait)  walker, front-wheeled  -AG      Distance in Feet (Gait)  300 x 2  -AG      Pattern (Gait)  step-through  -AG      Deviations/Abnormal Patterns (Gait)  dmitry decreased;stride length decreased  -AG      Bilateral Gait Deviations  forward flexed posture  -AG      Recorded by [AG] Ellen Esparza, PT      Row Name 03/05/20 1531             Safety Issues, Functional Mobility    Safety Issues Affecting Function (Mobility)  insight into deficits/self awareness;safety precaution awareness;safety precautions follow-through/compliance  -AG      Impairments Affecting Function (Mobility)  balance;coordination;endurance/activity tolerance;strength  -AG      Recorded by [AG] Ellen Esparza, PT      Row Name 03/05/20 1531             Vision Assessment/Intervention    Visual Impairment/Limitations  corrective lenses full time  -AG      Recorded by [AG] Ellen Esparza, PT      Row Name 03/05/20 1531             Pain Scale:  FACES Pre/Post-Treatment    Pain: FACES Scale, Pretreatment  0-->no hurt  -AG      Pain: FACES Scale, Post-Treatment  0-->no hurt  -AG      Recorded by [AG] Ellen Esparza, PT      Row Name 03/05/20 1531             Balance    Balance  static sitting balance;static standing balance;dynamic sitting balance;dynamic standing balance;standing balance activity  -AG      Additional Documentation  Balance (Row)  -AG      Recorded by [AG] Ellen Esparza, PT      Row Name 03/05/20 1531             Static Sitting Balance    Level of [place] (Unsupported Sitting, Static Balance)  supervision  -AG      Sitting Position (Unsupported Sitting, Static Balance)  sitting on edge of bed;sitting in wheelchair  -AG      Time Able to Maintain Position (Unsupported Sitting, Static Balance)  more than 5 minutes  -AG      Recorded by [AG] Ellen Esparza, PT      Row Name 03/05/20 1531             Dynamic Sitting Balance    Level of [place], Reaches Outside Midline (Sitting, Dynamic Balance)  contact guard assist;standby assist  -AG      Recorded by [AG] Ellen Esparza, PT      Row Name 03/05/20 1531             Static Standing Balance    Level of [place] (Supported Standing, Static Balance)  contact guard assist;standby assist  -AG      Assistive Device Utilized (Supported Standing, Static Balance)  walker, rolling  -AG      Recorded by [AG] Ellen Esparza, PT      Row Name 03/05/20 1531             Dynamic Standing Balance    Level of [place], Reaches Outside Midline (Standing, Dynamic Balance)  contact guard assist  -AG      Lower Extremity Device, Reaches Outside Midline (Standing, Dynamic Balance)  -- RW  -AG      Recorded by [AG] Ellen Esparza, PT      Row Name 03/05/20 1531             Standing Balance Activity    Activities Performed (Standing, Balance Training)  standing on foam roll  -AG      Support Needed for Balance (Standing, Balance Training)  uses at least one upper extremity for support;CGA  -AG       Recorded by [AG] Ellen Esparza, PT      Row Name 03/05/20 1531             Therapeutic Exercise    Therapeutic Exercise  seated, lower extremities;standing, lower extremities  -AG      Additional Documentation  Therapeutic Exercise (Row)  -AG      Recorded by [AG] Ellne Esparza PT      Row Name 03/05/20 1531             Lower Extremity Standing Therapeutic Exercise    Performed, Standing Lower Extremity (Therapeutic Exercise)  hip abduction/adduction;hip/knee flexion/extension;shallow squats;heel raises  -AG      Device, Standing Lower Extremity (Therapeutic Exercise)  parallel bars;other (see comments) RW  -AG      Exercise Type, Standing Lower Extremity (Therapeutic Exercise)  AROM (active range of motion)  -AG      Expected Outcomes, Standing Lower Extremity (Therapeutic Exercise)  improve functional tolerance, community activity;improve functional tolerance, household activity;improve functional tolerance, self-care activity;improve performance, gait skills;improve performance, transfer skills;strengthen normal movement patterns;strengthen, facilitate independent active range of motion  -AG      Sets/Reps Detail, Standing Lower Extremity (Therapeutic Exercise)  1x 20  -AG      Comment, Standing Lower Extremity (Therapeutic Exercise)  frequent verbal cues for task initiation and completion  -AG      Recorded by [AG] Ellen Esparza, PT      Row Name 03/05/20 1531             Lower Extremity Seated Therapeutic Exercise    Performed, Seated Lower Extremity (Therapeutic Exercise)  hip flexion/extension;hip abduction/adduction;knee flexion/extension;ankle dorsiflexion/plantarflexion;LAQ (long arc quad), knee extension  -AG      Device, Seated Lower Extremity (Therapeutic Exercise)  free weights, cuff;small ball  -AG      Exercise Type, Seated Lower Extremity (Therapeutic Exercise)  AROM (active range of motion)  -AG      Expected Outcomes, Seated Lower Extremity (Therapeutic Exercise)  improve functional tolerance,  community activity;improve functional tolerance, household activity;improve functional tolerance, self-care activity;improve performance, gait skills;improve performance, transfer skills;strengthen normal movement patterns;strengthen, facilitate independent active range of motion  -AG      Sets/Reps Detail, Seated Lower Extremity (Therapeutic Exercise)  2 x 20  -AG      Recorded by [AG] Ellen Esparza, PT      Row Name 03/05/20 1531             Positioning and Restraints    Pre-Treatment Position  in bed  -AG      Post Treatment Position  bed  -AG      In Bed  notified nsg;supine;call light within reach;encouraged to call for assist;side rails up x3 following PM session  -AG      Recorded by [AG] Ellen Esparza, PT      Row Name 03/05/20 1531             Daily Summary of Progress (PT)    Functional Goal Overall Progress: Physical Therapy  progressing toward functional goals as expected  -AG      Impairments Continuing to Limit Function: Physical Therapy  strength decreased;impaired balance;impaired communication;impaired functional activity tolerance  -AG      Recorded by [AG] Ellen Esparza, PT        User Key  (r) = Recorded By, (t) = Taken By, (c) = Cosigned By    Initials Name Effective Dates    AG Ellen Esparza, PT 04/03/18 -                  PT Recommendation and Plan        Daily Summary of Progress (PT)  Functional Goal Overall Progress: Physical Therapy: progressing toward functional goals as expected  Impairments Continuing to Limit Function: Physical Therapy: strength decreased, impaired balance, impaired communication, impaired functional activity tolerance               Time Calculation:     PT Charges     Row Name 03/05/20 1530 03/05/20 1527          Time Calculation    Start Time  1430  -AG  0920  -AG     Stop Time  1515  -AG  1005  -AG     Time Calculation (min)  45 min  -AG  45 min  -AG     PT Received On  --  03/05/20  -AG       User Key  (r) = Recorded By, (t) = Taken By, (c) = Cosigned By     Initials Name Provider Type     Ellen Esparza, PT Physical Therapist          Therapy Charges for Today     Code Description Service Date Service Provider Modifiers Qty    39549639543 HC PT NEUROMUSC RE EDUCATION EA 15 MIN 3/5/2020 Ellen Esparza, PT GP 6                   Ellen Esparza PT  3/5/2020         Electronically signed by Ellen Esparza PT at 20 1544          Occupational Therapy Notes (most recent note)      Suha Marinelli, OT at 20 1445          Inpatient Rehabilitation - Occupational Therapy Treatment Note     [facility]     Patient Name: Connie Arango  : 1940  MRN: [id number]    Today's Date: 3/4/2020                 Admit Date: 2020      Visit Dx:  No diagnosis found.    Patient Active Problem List   Diagnosis   • ICH (intracerebral hemorrhage) (CMS/HCC)         Therapy Treatment    IRF Treatment Summary     Row Name 20 1440             Evaluation/Treatment Time and Intent    Subjective Information  no complaints  -AB      Existing Precautions/Restrictions  fall;seizures;other (see comments) aphasia  -AB      Document Type  therapy note (daily note)  -AB      Mode of Treatment  occupational therapy  -AB      Recorded by [AB] Suha Marinelli OT      Row Name 20 1440             Cognition/Psychosocial- PT/OT    Affect/Mental Status (Cognitive)  -- aphasia  -AB      Follows Commands (Cognition)  verbal cues/prompting required;visual queue;physical/tactile prompts required;repetition of directions required;increased processing time needed  -AB      Personal Safety Interventions  fall prevention program maintained;gait belt;nonskid shoes/slippers when out of bed;supervised activity  -AB      Attention Deficit (Cognitive)  requires cues/redirection to task;distractible in noisy environment  -AB      Recorded by [AB] Suha Marinelli OT      Row Name 20 1440             Transfer Assessment/Treatment    Transfer Assessment/Treatment   toilet transfer  -AB      Recorded by [AB] Suha Marinelli OT      Row Name 03/04/20 1440             Toilet Transfer    [place] Level (Toilet Transfer)  contact guard;verbal cues;nonverbal cues (demo/gesture)  -AB      Assistive Device (Toilet Transfer)  wheelchair;grab bars/safety frame  -AB      Recorded by [AB] Suha Marinelli OT      Row Name 03/04/20 1440             Grooming Assessment/Treatment    Grooming [place] Level  minimum assist (75% patient effort);verbal cues;nonverbal cues (demo/gesture)  -AB      Grooming Position  sink side;supported sitting  -AB      Recorded by [AB] Suha Marinelli OT      Row Name 03/04/20 1440             Toileting Assessment/Treatment    Toileting [place] Level  minimum assist (75% patient effort);verbal cues;nonverbal cues (demo/gesture)  -AB      Assistive Device Use (Toileting)  grab bar/safety frame  -AB      Toileting Position  unsupported sitting  -AB      Recorded by [AB] Suha Marinelli OT      Row Name 03/04/20 1440             Upper Extremity Seated Therapeutic Exercise    Device, Seated Upper Extremity (Therapeutic Exercise)  restorator/arm bike [id number], 0 resistance  -AB      Exercise Type, Seated Upper Extremity (Therapeutic Exercise)  AROM (active range of motion) BUE CoordEx; G/FMC TherEx/Act; strengthening  -AB      Expected Outcomes, Seated Upper Extremity (Therapeutic Exercise)  improve functional tolerance, self-care activity;improve performance, BADLs;improve performance, transfer skills;improve performance, fine motor coordination skills;improve performance, gross motor coordination skills  -AB      Recorded by [AB] Suha Marinelli OT      Row Name 03/04/20 1440             Positioning and Restraints    Post Treatment Position  wheelchair  -AB      In Wheelchair  sitting;with SLP  -AB      Recorded by [AB] Suha Marinelli OT        User Key  (r) = Recorded By, (t) = Taken By, (c) =  Cosigned By    Initials Name Effective Dates    AB Suha Marinelli, OT 04/03/18 -                  OT Recommendation and Plan                 OT IRF GOALS     Row Name 02/27/20 1523             Bathing Goal 1 (OT-IRF)    [place] Level (Bathing Goal 1, OT-IRF)  moderate assist (50-74% patient effort)  -CJ      Time Frame (Bathing Goal 1, OT-IRF)  long term goal (LTG);by discharge  -CJ         LB Dressing Goal 1 (OT-IRF)    [place] (LB Dressing Goal 1, OT-IRF)  moderate assist (50-74% patient effort)  -CJ      Time Frame (LB Dressing Goal 1, OT-IRF)  short term goal (STG)  -CJ         LB Dressing Goal 2 (OT-IRF)    [place] (LB Dressing Goal 2, OT-IRF)  minimum assist (75% or more patient effort)  -CJ      Time Frame (LB Dressing Goal 2, OT-IRF)  long term goal (LTG);by discharge  -CJ         Toileting Goal 1 (OT-IRF)    [place] Level (Toileting Goal 1, OT-IRF)  maximum assist (25-49% patient effort)  -CJ      Time Frame (Toileting Goal 1, OT-IRF)  short term goal (STG)  -CJ         Toileting Goal 2 (OT-IRF)    [place] Level (Toileting Goal 2, OT-IRF)  moderate assist (50-74% patient effort)  -CJ      Time Frame (Toileting Goal 2, OT-IRF)  long term goal (LTG);by discharge  -CJ        User Key  (r) = Recorded By, (t) = Taken By, (c) = Cosigned By    Initials Name Provider Type    Daylin Beckman OT Occupational Therapist                     Time Calculation:     Time Calculation- OT     Row Name 03/04/20 1439             Time Calculation- OT    OT Start Time  0800  -AB      OT Stop Time  0915  -AB      OT Time Calculation (min)  75 min  -AB      Total Timed Code Minutes- OT  75 minute(s)  -AB      OT Non-Billable Time (min)  15 min  -AB        User Key  (r) = Recorded By, (t) = Taken By, (c) = Cosigned By    Initials Name Provider Type    Suha Vincent, OT Occupational Therapist          Therapy Charges for Today     Code Description Service Date Service Provider  "Modifiers Qty    43739341448 HC OT NEUROMUSC RE EDUCATION EA 15 MIN 3/4/2020 Suha Marinelli OT GO 2    18157003908 HC OT SELF CARE/MGMT/TRAIN EA 15 MIN 3/4/2020 Suha Marinelli OT GO 2    76320357855 HC OT THER PROC EA 15 MIN 3/4/2020 Suha Marinelli OT GO 1                   Suha Marinelli OT  3/4/2020    Electronically signed by Suha Marinelli OT at 20 1447          Speech Language Pathology Notes (most recent note)      Alexus Yanes, MS CCC-SLP at 20 1449          Inpatient Rehabilitation - Speech Language Pathology Treatment Note   [facility]     Patient Name: Connie Arango  : 1940  MRN: [id number]  Today's Date: 3/5/2020       Admit Date: 2020  Visit Dx:    No diagnosis found.  Patient Active Problem List   Diagnosis   • ICH (intracerebral hemorrhage) (CMS/[facility])        Therapy Treatment     Ms. Arango is seen this am in speech therapy office for formal s/l therapy to address deficits. She is positioned upright and centered in wheelchair w/ gait belt attached. She is a/a cooperative to participate. She is evidenced w/ improvement in table top tasks this am.    Long Term Goal:  1.Pt will improve receptive language skills to allow for maximal communication and safety in adls.   2.Pt will improve expressive language skills to allow for maximal communication and safety in adls.      Short Term Goals:  1. Pt will receptively identify common tangible objects from field of 2 w/ 50% acc and mod cues over 3 consecutive sessions.   *pt is able to identify \"sock, ball, mug\" from FO2 given max cues w/ 60% acc. Pt unable to receptively identify any other common tangible objects. Pt is able to match letters A-Z w/ 95% acc given mod cues.  2. Pt will receptively demonstrate object fnx w/ 60% acc and mod cues over 3 consecutive sessions.   *pt able to receptively demonstrate object fnx of \"marker, spoon, cup, and mug\" w/ 60% acc given mod-max cues to " "initiate fnx of objects. Significant perseveration during this task.  3. Pt will id body parts w/ 40% acc and mod cues over 3 consecutive sessions.   *not directly addressed this session.  4. Pt will follow simple one step directives 3/5 opp w/ mod-max cues over 3 consecutive sessions.   *pt able to follow simple one step directives related to table top tasks w/ 60% acc given mod-max cues. Pt often requires multiple re-directions during tasks to sustain attention.  5. Pt will verbally produce biographical information 3/5 opp w/ mod-max cues over 3 consecutive session.   *not directly addressed this session.  6. Pt will verbally respond to simple y/n questions w/ 75% acc and mod cues over 3 consecutive sessions.   *pt verbally responds to simple y/n questions w/ 65% acc related to adls, table top tasks. Pt often perseverates during this task.   7. Pt will confrontation name common tangible objects 2/5 opp w/ mod-max cues over 3 consecutive sessions.   *pt able to name \"brush, cup, spoon, ball, sock, mug, knife\" after max cues and models from SLP.  8. Pt will perform rote speech tasks 3/5 opp w/ mod cues over 3 consecutive sessions.   *pt able to name letters A-Z given mod-max cues w/ visual stimuli on table top. Perseveration noted w/ letters \"w, x\"; pt able to verbalize DEWEY and RASHAAD after visual table top cues and max modeling from SLP. Improvement in spontaneous speech this session as well as ability to participate in tasks.    *Additional goals to be added/modified as necessary.      Thank you-  Alexus Yanes M.S., CCC-SLP      EDUCATION  The patient has been educated in the following areas:   Cognitive Impairment Communication Impairment.    SLP Recommendation and Plan    Continue tx per poc.  Improvement in spontaneous speech this session as well as ability to participate in tasks.    Time Calculation:     Time Calculation- SLP     Time Calculation- SLP     Row Name 03/05/20 1446    SLP Start Time  1045  " -Recorded by: AMARIS    SLP Stop Time  1135  -Recorded by: AMARIS    SLP Time Calculation (min)  50 min  -Recorded by: AMARIS    SLP - Next Appointment  03/06/20  -Recorded by: AMARIS            User Key     Initials Name Provider Type    Alexus Lamar MS CCC-SLP Speech and Language Pathologist          Therapy Charges for Today     Code Description Service Date Service Provider Modifiers Qty    11926527614 HC ST TREATMENT SPEECH 3 3/4/2020 Alexus Yanes MS CCC-SLP GN 1    [id number] HC ST TREATMENT SPEECH 3 3/5/2020 Alexus Yanes, MS ELKINS-SLP GN 1                     MS DAY Luna  3/5/2020      Electronically signed by Alexus Yanes MS CCC-SLP at 03/05/20 1455       Respiratory Therapy Notes (most recent note)    No notes exist for this encounter.

## 2020-03-05 NOTE — PLAN OF CARE
Pt actively participated in formal s/l/cog tx this am. Continue tx per poc. Continues to make progress towards her goals.  Problem: Patient Care Overview  Goal: Plan of Care Review  Outcome: Ongoing (interventions implemented as appropriate)  Flowsheets (Taken 3/5/2020 1345 by Linsey Doan, RN)  IRF Plan of Care Review: progress ongoing, continue     Problem: Communication Impairment (IRF) (Adult)  Goal: Optimal/Safe Level of Communication [place]  Outcome: Ongoing (interventions implemented as appropriate)  Flowsheets (Taken 2/29/2020 1409 by Patience Yanes MA,CCC-SLP)  Optimal/Safe Level of Communication [place]: demonstrating adequate progress  Goal: Optimal Quality of Life in Relation to Communication  Outcome: Ongoing (interventions implemented as appropriate)  Flowsheets (Taken 2/29/2020 1409 by Patience Yanes MA,CCC-SLP)  Optimal Quality of Life in Relation to Communication: demonstrating adequate progress

## 2020-03-05 NOTE — PROGRESS NOTES
Occupational Therapy: Individual: 25 minutes.    Physical Therapy:    Speech Language Pathology:    Signed by: Suha Marinelli, Occupational Therapist

## 2020-03-05 NOTE — PROGRESS NOTES
Occupational Therapy:    Physical Therapy:    Speech Language Pathology: Individual: 50 minutes.    Signed by: Alexus Yanes Speech therapist

## 2020-03-06 LAB
ALBUMIN SERPL-MCNC: 3.16 G/DL (ref 3.5–5.2)
ALBUMIN/GLOB SERPL: 1.2 G/DL
ALP SERPL-CCNC: 73 U/L (ref 39–117)
ALT SERPL W P-5'-P-CCNC: 26 U/L (ref 1–33)
ANION GAP SERPL CALCULATED.3IONS-SCNC: 12.3 MMOL/L (ref 5–15)
AST SERPL-CCNC: 34 U/L (ref 1–32)
BASOPHILS # BLD AUTO: 0.01 10*3/MM3 (ref 0–0.2)
BASOPHILS NFR BLD AUTO: 0.2 % (ref 0–1.5)
BILIRUB SERPL-MCNC: 0.4 MG/DL (ref 0.2–1.2)
BUN BLD-MCNC: 11 MG/DL (ref 8–23)
BUN/CREAT SERPL: 14.9 (ref 7–25)
CALCIUM SPEC-SCNC: 9.2 MG/DL (ref 8.6–10.5)
CHLORIDE SERPL-SCNC: 106 MMOL/L (ref 98–107)
CO2 SERPL-SCNC: 23.7 MMOL/L (ref 22–29)
CREAT BLD-MCNC: 0.74 MG/DL (ref 0.57–1)
DEPRECATED RDW RBC AUTO: 45.4 FL (ref 37–54)
EOSINOPHIL # BLD AUTO: 0.08 10*3/MM3 (ref 0–0.4)
EOSINOPHIL NFR BLD AUTO: 1.7 % (ref 0.3–6.2)
ERYTHROCYTE [DISTWIDTH] IN BLOOD BY AUTOMATED COUNT: 14.1 % (ref 12.3–15.4)
GFR SERPL CREATININE-BSD FRML MDRD: 76 ML/MIN/1.73
GLOBULIN UR ELPH-MCNC: 2.5 GM/DL
GLUCOSE BLD-MCNC: 101 MG/DL (ref 65–99)
HCT VFR BLD AUTO: 35.6 % (ref 34–46.6)
HGB BLD-MCNC: 11.2 G/DL (ref 12–15.9)
IMM GRANULOCYTES # BLD AUTO: 0.01 10*3/MM3 (ref 0–0.05)
IMM GRANULOCYTES NFR BLD AUTO: 0.2 % (ref 0–0.5)
LYMPHOCYTES # BLD AUTO: 1.33 10*3/MM3 (ref 0.7–3.1)
LYMPHOCYTES NFR BLD AUTO: 27.7 % (ref 19.6–45.3)
MAGNESIUM SERPL-MCNC: 2.3 MG/DL (ref 1.6–2.4)
MCH RBC QN AUTO: 28 PG (ref 26.6–33)
MCHC RBC AUTO-ENTMCNC: 31.5 G/DL (ref 31.5–35.7)
MCV RBC AUTO: 89 FL (ref 79–97)
MONOCYTES # BLD AUTO: 0.8 10*3/MM3 (ref 0.1–0.9)
MONOCYTES NFR BLD AUTO: 16.7 % (ref 5–12)
NEUTROPHILS # BLD AUTO: 2.57 10*3/MM3 (ref 1.7–7)
NEUTROPHILS NFR BLD AUTO: 53.5 % (ref 42.7–76)
NRBC BLD AUTO-RTO: 0 /100 WBC (ref 0–0.2)
PLATELET # BLD AUTO: 193 10*3/MM3 (ref 140–450)
PMV BLD AUTO: 11.6 FL (ref 6–12)
POTASSIUM BLD-SCNC: 3.9 MMOL/L (ref 3.5–5.2)
PROT SERPL-MCNC: 5.7 G/DL (ref 6–8.5)
RBC # BLD AUTO: 4 10*6/MM3 (ref 3.77–5.28)
SODIUM BLD-SCNC: 142 MMOL/L (ref 136–145)
WBC NRBC COR # BLD: 4.8 10*3/MM3 (ref 3.4–10.8)

## 2020-03-06 PROCEDURE — 97535 SELF CARE MNGMENT TRAINING: CPT

## 2020-03-06 PROCEDURE — 97116 GAIT TRAINING THERAPY: CPT

## 2020-03-06 PROCEDURE — 97110 THERAPEUTIC EXERCISES: CPT

## 2020-03-06 PROCEDURE — 97530 THERAPEUTIC ACTIVITIES: CPT

## 2020-03-06 PROCEDURE — 80053 COMPREHEN METABOLIC PANEL: CPT | Performed by: INTERNAL MEDICINE

## 2020-03-06 PROCEDURE — 83735 ASSAY OF MAGNESIUM: CPT | Performed by: INTERNAL MEDICINE

## 2020-03-06 PROCEDURE — 92507 TX SP LANG VOICE COMM INDIV: CPT | Performed by: SPEECH-LANGUAGE PATHOLOGIST

## 2020-03-06 PROCEDURE — 85025 COMPLETE CBC W/AUTO DIFF WBC: CPT | Performed by: INTERNAL MEDICINE

## 2020-03-06 RX ADMIN — POTASSIUM CHLORIDE 10 MEQ: 750 TABLET, FILM COATED, EXTENDED RELEASE ORAL at 08:36

## 2020-03-06 RX ADMIN — CARVEDILOL 12.5 MG: 25 TABLET, FILM COATED ORAL at 17:09

## 2020-03-06 RX ADMIN — FUROSEMIDE 20 MG: 20 TABLET ORAL at 08:36

## 2020-03-06 RX ADMIN — CARVEDILOL 12.5 MG: 25 TABLET, FILM COATED ORAL at 08:36

## 2020-03-06 RX ADMIN — FAMOTIDINE 40 MG: 20 TABLET, FILM COATED ORAL at 08:36

## 2020-03-06 RX ADMIN — MAGNESIUM GLUCONATE 500 MG ORAL TABLET 400 MG: 500 TABLET ORAL at 10:01

## 2020-03-06 RX ADMIN — ASPIRIN 81 MG: 81 TABLET, CHEWABLE ORAL at 08:36

## 2020-03-06 RX ADMIN — ATORVASTATIN CALCIUM 40 MG: 40 TABLET, FILM COATED ORAL at 21:04

## 2020-03-06 RX ADMIN — IMIPRAMINE HYDROCHLORIDE 10 MG: 10 TABLET ORAL at 21:04

## 2020-03-06 NOTE — PROGRESS NOTES
PROGRESS NOTE         Patient Identification:  Name:  Connie Arango  Age:  79 y.o.  Sex:  female  :  1940  MRN:  [id number]  Visit Number:  [id number]  Primary Care Provider:  Luis Enrique Prieto MD         LOS: 9 days       ----------------------------------------------------------------------------------------------------------------------  Subjective       Chief Complaints:    Intracranial hemorrhage   Encephalopathy   debility      Interval History:       Vitals are stable with pulse ox 94% on room air no fever overnight labs as of this morning on 3/6/2020 with a creatinine of 0.74 potassium of 3.9 magnesium 2.3 normal white count and stable hemoglobin at 11.2.  Patient denies any chest pain shortness of breath nausea vomiting or diarrhea.    Patient participated with occupational therapy, speech therapy, and physical therapy on 3/5/20. Patient's mobility is at full weight-bearing on both sides. Bed-chair transfer is at contact guard with verbal and nonverbal cues and using a wheelchair. Stand-sit transfer is at contact guard with verbal and nonverbal cues using a front-wheeled walker. Stand pivot/stand step transfer is at contact guard with verbal and nonverbal cues using a front-wheeled walker. Patient ambulated 300 feet x2 at contact guard with verbal and nonverbal cues using a front-wheeled walker.        Review of Systems:    Constitutional: no fever, chills and night sweats.  Eyes: no eye drainage, itching or redness.  HEENT: no mouth sores, dysphagia or nose bleed.  Respiratory: no for shortness of breath, cough or production of sputum.  Cardiovascular: no chest pain, no palpitations, no orthopnea.  Gastrointestinal: no nausea, vomiting or diarrhea. No abdominal pain, hematemesis or rectal bleeding.  Genitourinary: no dysuria or polyuria.  Hematologic/lymphatic: no lymph node abnormalities, no easy bruising or easy bleeding.  Musculoskeletal: no muscle or joint pain.  Skin: No rash  and no itching.  Neurological: no loss of consciousness, no seizure, no headache.  Behavioral/Psych: no depression or suicidal ideation.  Endocrine: no hot flashes.  Immunologic: negative.  ----------------------------------------------------------------------------------------------------------------------      Objective       [facility] [place] Meds:    aspirin 81 mg Oral Daily   atorvastatin 40 mg Oral Nightly   carvedilol 12.5 mg Oral BID With Meals   famotidine 40 mg Oral Daily   furosemide 20 mg Oral Daily   imipramine 10 mg Oral Nightly   magnesium oxide 400 mg Oral Daily   potassium chloride 10 mEq Oral Daily        ----------------------------------------------------------------------------------------------------------------------    Vital Signs:  Temp:  [98.4 °F (36.9 °C)] 98.4 °F (36.9 °C)  Heart Rate:  [76] 76  Resp:  [18] 18  BP: (138)/(72) 138/72  No data found.  SpO2 Percentage    03/04/20 0705 03/04/20 1914 03/05/20 1911   SpO2: 95% 94% 94%     SpO2:  [94 %] 94 %  on   ;   Device (Oxygen Therapy): room air    Body mass index is 26.45 kg/m².  Wt Readings from Last 3 Encounters:   02/26/20 72 kg (158 lb 11.7 oz)        Intake/Output Summary (Last 24 hours) at 3/6/2020 0724  Last data filed at 3/6/2020 0513  Gross per 24 hour   Intake 960 ml   Output --   Net 960 ml     Diet Pureed; Thin  ----------------------------------------------------------------------------------------------------------------------      Physical Exam:     General Appearance: alert and pleasant.  Slightly confused this morning.    Head: normocephalic, without obvious abnormality and atraumatic     Eyes: lids and lashes normal, conjunctivae and sclerae normal, no icterus, no pallor, corneas clear and PERRLA     Ears: ears appear intact with no abnormalities noted     Nose: nares normal, septum midline, mucosa normal and no drainage                Throat: no oral lesions, no thrush, oral mucosa moist and oopharynx normal     Neck: no  adenopathy, supple, trachea midline, no thyromegaly, no carotid bruit and no JVD     Back: no kyphosis present, no scoliosis present, no skin lesions, erythema, or scars, no tenderness to percussion or palpation and range of motion normal     Lungs: clear to auscultation, respirations regular, respirations even and  respirations unlabored. No accessory muscle use.      Heart:: regular rhythm & normal rate, normal S1, S2, no murmur, no gallop, no rub and no click.  Chest wall with no abnormalities observed. PMI nondisplaced     Abdomen: normal bowel sounds in all quadrants, no masses, no hepatomegaly, no splenomegaly, soft non-tender, no guarding and no rebound tenderness     Extremities: no edema, no cyanosis, no redness, no tenderness, no clubbing     Musculoskeletal: joints with no effusion nor erythema nor warmth.  Pedal pulses palpable and equal bilaterally     Skin: no bleeding, bruising or rash and no lesions noted     Lymph Nodes: no palpable adenopathy     Neurologic: Alert and awake.  Mild aphasia.              Sensory intact in BLE and BUE.              Motor strength Generalized weakness  ----------------------------------------------------------------------------------------------------------------------            Results from last 7 days   Lab Units 03/06/20  0152   WBC 10*3/mm3 4.80   HEMOGLOBIN g/dL 11.2*   HEMATOCRIT % 35.6   MCV fL 89.0   MCHC g/dL 31.5   PLATELETS 10*3/mm3 193     Results from last 7 days   Lab Units 03/06/20  0152   SODIUM mmol/L 142   POTASSIUM mmol/L 3.9   MAGNESIUM mg/dL 2.3   CHLORIDE mmol/L 106   CO2 mmol/L 23.7   BUN mg/dL 11   CREATININE mg/dL 0.74   EGFR IF NONAFRICN AM mL/min/1.73 76   CALCIUM mg/dL 9.2   GLUCOSE mg/dL 101*   ALBUMIN g/dL 3.16*   BILIRUBIN mg/dL 0.4   ALK PHOS U/L 73   AST (SGOT) U/L 34*   ALT (SGPT) U/L 26   Estimated Creatinine Clearance: 55.7 mL/min (by C-G formula based on SCr of 0.74 mg/dL).  No results found for: AMMONIA    No results found for:  HGBA1C, POCGLU  No results found for: HGBA1C  No results found for: TSH, FREET4    No results found for: BLOODCX  No results found for: URINECX  No results found for: WOUNDCX  No results found for: STOOLCX  No results found for: RESPCX  Pain Management Panel     There is no flowsheet data to display.            ----------------------------------------------------------------------------------------------------------------------  Imaging Results (Last 24 Hours)     ** No results found for the last 24 hours. **          ----------------------------------------------------------------------------------------------------------------------    Assessment/Plan       Assessment/Plan     ASSESSMENT:    Multifocal CVA/left parietotemporal ICH   Small acute corical ischemic infarcts in the left parietal Lobe and right parieto-occipital region  Left parietotemporal hemorrhage  Encephalopathy  Acute hypoxia respiratory failure  HTN  Hypotension  Small bilateral pleural effusions  Dysphagia  Global aphasia     PLAN:     Vitals are stable with pulse ox 94% on room air no fever overnight labs as of this morning on 3/6/2020 with a creatinine of 0.74 potassium of 3.9 magnesium 2.3 normal white count and stable hemoglobin at 11.2.  Patient denies any chest pain shortness of breath nausea vomiting or diarrhea.    Patient participated with occupational therapy, speech therapy, and physical therapy on 3/5/20. Patient's mobility is at full weight-bearing on both sides. Bed-chair transfer is at contact guard with verbal and nonverbal cues and using a wheelchair. Stand-sit transfer is at contact guard with verbal and nonverbal cues using a front-wheeled walker. Stand pivot/stand step transfer is at contact guard with verbal and nonverbal cues using a front-wheeled walker. Patient ambulated 300 feet x2 at contact guard with verbal and nonverbal cues using a front-wheeled walker.        Code Status:   Code Status and Medical Interventions:      Ordered at: 02/26/20 1948     Code Status:    CPR     Medical Interventions (Level of Support Prior to Arrest):    Full       Sean Coppola MD  03/06/20  7:23 AM

## 2020-03-06 NOTE — PROGRESS NOTES
Occupational Therapy: Individual: 85 minutes.    Physical Therapy:    Speech Language Pathology:    Signed by: Daylin Rebolledo, Occupational Therapist

## 2020-03-06 NOTE — SIGNIFICANT NOTE
03/06/20 1402   Plan   Plan Spoke to Tracy at [facility] [phone] who states to send referral for review.  [facility] will review pt for admission and if she can be accepted and bed available then insurance would be contacted for PA.  Reviewed status of SNF placement with brother Rohan [phone] per request.

## 2020-03-06 NOTE — SIGNIFICANT NOTE
03/06/20 2760   Plan   Plan Received message from Ellen with [facility] who declines admissin due to distance pt is walking and possible need for long-term placement with cognitive deficits if unable to return home.  Ellen also anticipates insurance may not approve many days for SNF.

## 2020-03-06 NOTE — PROGRESS NOTES
Rehabilitation Nursing  Inpatient Rehabilitation Plan of Care Note    Plan of Care  Copy from Farooq    Pain Management (Active)  Current Status (2/26/2020 4:32:00 PM): Potential for pain  Weekly Goal: No pain this week  Discharge Goal: no pain    Safety    Potential for Injury (Active)  Current Status (2/26/2020 4:32:00 PM): At risk for injury  Weekly Goal: No injury this week  Discharge Goal: No injury    Signed by: Linsey Veras, Nurse

## 2020-03-06 NOTE — THERAPY TREATMENT NOTE
Inpatient Rehabilitation - Physical Therapy Treatment Note   Dominic     Patient Name: Connie Arango  : 1940  MRN: [id number]    Today's Date: 3/6/2020                 Admit Date: 2020      Visit Dx:    No diagnosis found.    Patient Active Problem List   Diagnosis   • ICH (intracerebral hemorrhage) (CMS/HCC)       Therapy Treatment    IRF Treatment Summary     Row Name 20 1537 20 1520 20 1433       Evaluation/Treatment Time and Intent    Subjective Information  no complaints  (Pended)   -  no complaints  -  no complaints agreeable to therapy  -    Existing Precautions/Restrictions  fall;seizures  (Pended)   -  fall;seizures  -  fall;seizures  -    Document Type  therapy note (daily note)  (Pended)   -  therapy note (daily note)  -  therapy note (daily note)  -    Mode of Treatment  physical therapy  (Pended)   -  physical therapy  -  occupational therapy  -    Patient/Family Observations  Patient required heavy cueing throughout; however good functional mobility and ambulation quality noted.  (Pended)   -  Pt and nursing in agreement for skilled PT.   -RG  --    Recorded by [JW] Roque Jackson PTA Student [RG] Prakash Haque PTA [CJ] Daylin Rebolledo OT    Row Name 20 1537 20 15220 1433       Cognition/Psychosocial- PT/OT    Affect/Mental Status (Cognitive)  WFL  (Pended)   -  WFL  -  --    Follows Commands (Cognition)  physical/tactile prompts required  (Pended)   -  physical/tactile prompts required  -  physical/tactile prompts required;repetition of directions required;verbal cues/prompting required;increased processing time needed  -    Personal Safety Interventions  --  supervised activity;gait belt;nonskid shoes/slippers when out of bed  -  --    Cognitive Function (Cognitive)  safety deficit;unable/difficult to assess  (Pended)   -  safety deficit;unable/difficult to assess  -  safety deficit  -    Attention  Deficit (Cognitive)  requires cues/redirection to task  (Pended)   -JW  requires cues/redirection to task  -RG  requires cues/redirection to task  -CJ    Safety Deficit (Cognitive)  --  safety precautions awareness;safety precautions follow-through/compliance  -RG  safety precautions awareness;awareness of need for assistance;impulsivity  -CJ    Recorded by [JW] Roque Jackson PTA Student [RG] Prakash Haque PTA [CJ] Daylin Rebolledo, OT    Row Name 03/06/20 1433             Communication Assessment/Intervention    Additional Documentation  -- aphasia  -CJ      Recorded by [CJ] Daylin Rebolledo, OT      Row Name 03/06/20 1537 03/06/20 1520          Mobility    Extremity Weight-bearing Status  left lower extremity;right lower extremity  (Pended)   -JW  left lower extremity;right lower extremity  -RG     Left Lower Extremity (Weight-bearing Status)  full weight-bearing (FWB)  (Pended)   -JW  full weight-bearing (FWB)  -RG     Right Lower Extremity (Weight-bearing Status)  full weight-bearing (FWB)  (Pended)   -  full weight-bearing (FWB)  -RG     Recorded by [JW] Roque Jackson PTA Student [RG] Prakash Haque PTA     Row Name 03/06/20 1537 03/06/20 1520          Bed Mobility Assessment/Treatment    Bed Mobility Assessment/Treatment  sit-supine  (Pended)   -  --     Sit-Supine [place] (Bed Mobility)  contact guard;verbal cues;nonverbal cues (demo/gesture)  (Pended)   -  --     Bed Mobility, Safety Issues  decreased use of arms for pushing/pulling;decreased use of legs for bridging/pushing  (Pended)   -  decreased use of arms for pushing/pulling;decreased use of legs for bridging/pushing  -     Assistive Device (Bed Mobility)  bed rails  (Pended)   -  bed rails  -RG     Recorded by [JW] Roque Jackson PTA Student [RG] Prakash Haque PTA     Row Name 03/06/20 1537 03/06/20 1520          Transfer Assessment/Treatment    Transfer Assessment/Treatment  sit-stand transfer;stand-sit transfer;stand  pivot/stand step transfer  (Pended)   -  sit-stand transfer;stand-sit transfer;stand pivot/stand step transfer  -RG     Recorded by [JW] Roque Jackson PTA Student [RG] Prakash Haque PTA     Row Name 03/06/20 1537 03/06/20 1520 03/06/20 1433       Bed-Chair Transfer    Bed-Chair [place] (Transfers)  verbal cues;nonverbal cues (demo/gesture);contact guard  (Pended)   -  verbal cues;nonverbal cues (demo/gesture);contact guard  -RG  contact guard;verbal cues  -CJ    Assistive Device (Bed-Chair Transfers)  wheelchair  (Pended)   -  wheelchair  -RG  wheelchair  -CJ    Recorded by [JW] Roque Jackson PTA Student [RG] Prakash Haque PTA [CJ] Daylin Rebolledo OT    Row Name 03/06/20 1537 03/06/20 1520          Chair-Bed Transfer    Chair-Bed [place] (Transfers)  verbal cues;nonverbal cues (demo/gesture);contact guard  (Pended)   -  verbal cues;nonverbal cues (demo/gesture);contact guard  -RG     Assistive Device (Chair-Bed Transfers)  wheelchair  (Pended)   -  wheelchair  -RG     Recorded by [JW] Roque Jackson PTA Student [RG] Prakash Haque PTA     Row Name 03/06/20 1537 03/06/20 1520          Sit-Stand Transfer    Sit-Stand [place] (Transfers)  verbal cues;nonverbal cues (demo/gesture);contact guard  (Pended)   -  verbal cues;nonverbal cues (demo/gesture);contact guard  -RG     Assistive Device (Sit-Stand Transfers)  walker, front-wheeled  (Pended)   -  walker, front-wheeled  -RG     Recorded by [JW] Roque Jackson PTA Student [RG] Prakash Haque PTA     Row Name 03/06/20 1537 03/06/20 1520          Stand-Sit Transfer    Stand-Sit [place] (Transfers)  verbal cues;nonverbal cues (demo/gesture);contact guard  (Pended)   -  verbal cues;nonverbal cues (demo/gesture);contact guard  -RG     Assistive Device (Stand-Sit Transfers)  walker, front-wheeled  (Pended)   -  walker, front-wheeled  -RG     Recorded by [JW] Roque Jackson PTA Student [RG] Prakash Haque, JD     Row Name  03/06/20 1537 03/06/20 1520          Stand Pivot/Stand Step Transfer    Stand Pivot/Stand Step [place]  verbal cues;nonverbal cues (demo/gesture);contact guard  (Pended)   -  verbal cues;nonverbal cues (demo/gesture);contact guard  -RG     Assistive Device (Stand Pivot Stand Step Transfer)  walker, front-wheeled  (Pended)   -  walker, front-wheeled  -RG     Recorded by [JW] Roque Jackson PTA Student [RG] Prakash Haque PTA     Row Name 03/06/20 1433             Toilet Transfer    [place] Level (Toilet Transfer)  contact guard;verbal cues  -      Assistive Device (Toilet Transfer)  grab bars/safety frame  -CJ      Recorded by [CJ] Daylin Rebolledo OT      Row Name 03/06/20 1537 03/06/20 1520          Gait/Stairs Assessment/Training    Gait/Stairs Assessment/Training  gait/ambulation independence;gait/ambulation assistive device;distance ambulated;gait pattern;gait deviations  (Pended)   -  gait/ambulation independence;gait/ambulation assistive device;distance ambulated;gait pattern;gait deviations  -     [place] Level (Gait)  verbal cues;nonverbal cues (demo/gesture)  (Pended)  SBA  -  verbal cues;nonverbal cues (demo/gesture);contact guard  -     Assistive Device (Gait)  walker, front-wheeled  (Pended)   -  walker, front-wheeled  -     Distance in Feet (Gait)  160  (Pended)   -  320'  -     Pattern (Gait)  step-through  (Pended)   -  step-through  -RG     Deviations/Abnormal Patterns (Gait)  dmitry decreased;stride length decreased  (Pended)   -  dmitry decreased;stride length decreased  -RG     Bilateral Gait Deviations  forward flexed posture  (Pended)   -  forward flexed posture  -RG     Comment (Gait/Stairs)  Standing rest breaks required  (Pended)   -  --     Recorded by [JW] Roque Jackson PTA Student [RG] Prakash Haque PTA     Row Name 03/06/20 1537 03/06/20 1520          Safety Issues, Functional Mobility    Impairments Affecting Function (Mobility)   balance;coordination;endurance/activity tolerance;strength  (Pended)   -  balance;coordination;endurance/activity tolerance;strength  -RG     Recorded by [JW] Roque Jackson PTA Student [RG] Prakash Haque, [facility]     Row Name 03/06/20 1433             Bathing Assessment/Treatment    Bathing [place] Level  minimum assist (75% patient effort);verbal cues;nonverbal cues (demo/gesture)  -CJ      Recorded by [CJ] Daylin Rebolledo, OT      Row Name 03/06/20 1433             Upper Body Dressing Assessment/Treatment    Upper Body Dressing Task  supervision;set up assistance;verbal cues  -CJ      Recorded by [CJ] Daylin Rebolledo, OT      Row Name 03/06/20 1433             Lower Body Dressing Assessment/Treatment    Lower Body Dressing [place] Level  minimum assist (75% patient effort);verbal cues;nonverbal cues (demo/gesture)  -CJ      Recorded by [CJ] Daylin Rebolledo, OT      Row Name 03/06/20 1433             Grooming Assessment/Treatment    Grooming [place] Level  set up;supervision;verbal cues  -CJ      Recorded by [CJ] Daylin Rebolledo, OT      Row Name 03/06/20 1433             Toileting Assessment/Treatment    Toileting [place] Level  minimum assist (75% patient effort);verbal cues  -      Assistive Device Use (Toileting)  grab bar/safety frame  -CJ      Recorded by [CJ] Daylin Rebolledo, OT      Row Name 03/06/20 1537 03/06/20 1520          Vision Assessment/Intervention    Visual Impairment/Limitations  corrective lenses full time  (Pended)   -  corrective lenses full time  -RG     Recorded by [JW] Roque Jackson PTA Student [RG] Prakash Haque, [facility]     Row Name 03/06/20 1537 03/06/20 1520          Pain Scale: FACES Pre/Post-Treatment    Pain: FACES Scale, Pretreatment  0-->no hurt  (Pended)   -  0-->no hurt  -RG     Pain: FACES Scale, Post-Treatment  0-->no hurt  (Pended)   -  0-->no hurt  -RG     Recorded by [JW] Roque Jackson PTA Student [RG] Prakash Haque, [facility]     Row Name  03/06/20 1537 03/06/20 1520          Balance    Balance  static sitting balance;static standing balance;dynamic sitting balance;dynamic standing balance;standing balance activity;dynamic balance activity  (Pended)   -  static sitting balance;static standing balance;dynamic sitting balance;dynamic standing balance;standing balance activity;dynamic balance activity  -RG     Recorded by [JW] Roque Jackson PTA Student [RG] Prakash Haque PTA     Row Name 03/06/20 1520             Static Sitting Balance    Level of [place] (Unsupported Sitting, Static Balance)  supervision  -RG      Sitting Position (Unsupported Sitting, Static Balance)  sitting on edge of bed;sitting in wheelchair  -RG      Time Able to Maintain Position (Unsupported Sitting, Static Balance)  more than 5 minutes  -RG      Recorded by [RG] Prakash Haque PTA      Row Name 03/06/20 1520             Static Standing Balance    Level of [place] (Supported Standing, Static Balance)  contact guard assist  -RG      Assistive Device Utilized (Supported Standing, Static Balance)  walker, rolling  -RG      Recorded by [RG] Prakash Haque PTA      Row Name 03/06/20 1520             Dynamic Standing Balance    Level of [place], Reaches Outside Midline (Standing, Dynamic Balance)  contact guard assist  -RG      Recorded by [RG] Prakash Haque PTA      Row Name 03/06/20 1433             Upper Extremity Seated Therapeutic Exercise    Exercise Type, Seated Upper Extremity (Therapeutic Exercise)  AROM (active range of motion) BUE ther ex/act, bilat coord ex, GMC, FMC  -      Expected Outcomes, Seated Upper Extremity (Therapeutic Exercise)  improve functional tolerance, self-care activity;improve performance, BADLs;improve performance, transfer skills;improve performance, gross motor coordination skills;improve performance, fine motor coordination skills  -CJ      Recorded by [CJ] Daylin Rebolledo OT      Row Name 03/06/20 1520             Lower  Extremity Standing Therapeutic Exercise    Performed, Standing Lower Extremity (Therapeutic Exercise)  hip flexion/extension;hip abduction/adduction;mini-squats;heel raises H-T walking on foam pad  -RG      Device, Standing Lower Extremity (Therapeutic Exercise)  parallel bars  -      Exercise Type, Standing Lower Extremity (Therapeutic Exercise)  AROM (active range of motion)  -      Expected Outcomes, Standing Lower Extremity (Therapeutic Exercise)  improve functional tolerance, community activity;improve functional tolerance, household activity;improve functional tolerance, self-care activity;improve performance, gait skills;improve performance, transfer skills  -      Sets/Reps Detail, Standing Lower Extremity (Therapeutic Exercise)  20  -RG      Comment, Standing Lower Extremity (Therapeutic Exercise)  tactile cues to stay on task   -RG      Recorded by [RG] Prakash Haque PTA      Row Name 03/06/20 1537             Lower Extremity Seated Therapeutic Exercise    Performed, Seated Lower Extremity (Therapeutic Exercise)  hip flexion/extension;hip abduction/adduction;knee flexion/extension;ankle dorsiflexion/plantarflexion;LAQ (long arc quad), knee extension  (Pended)   -      Device, Seated Lower Extremity (Therapeutic Exercise)  elastic bands/tubing;small ball  (Pended)  GTB  -      Exercise Type, Seated Lower Extremity (Therapeutic Exercise)  AROM (active range of motion);eccentric contraction;isotonic contraction, concentric  (Pended)   -      Expected Outcomes, Seated Lower Extremity (Therapeutic Exercise)  improve functional tolerance, community activity;improve functional tolerance, household activity;improve functional tolerance, self-care activity;improve performance, gait skills;strengthen normal movement patterns;strengthen, facilitate independent active range of motion  (Pended)   -      Sets/Reps Detail, Seated Lower Extremity (Therapeutic Exercise)  20  (Pended)   -      Comment,  Seated Lower Extremity (Therapeutic Exercise)  Verbal and tactile cues for proper technique  (Pended)   -JW      Recorded by [JW] Roque Jackson PTA Student      Row Name 03/06/20 1537 03/06/20 1520 03/06/20 1433       Positioning and Restraints    Pre-Treatment Position  sitting in chair/recliner  (Pended)   -JW  in bed  -RG  --    Post Treatment Position  --  bed  -RG  wheelchair  -CJ    In Bed  supine;call light within reach;encouraged to call for assist;exit alarm on  (Pended)   -JW  notified nsg;supine;call light within reach;encouraged to call for assist;exit alarm on;legs elevated  -RG  with SLP;with PT with SLP- first tx;  PT-second tx  -CJ    Recorded by [JW] Roque Jackson PTA Student [RG] Prakahs Haque PTA [CJ] Daylin Rebolledo, OT    Row Name 03/06/20 1537 03/06/20 1520          Daily Summary of Progress (PT)    Impairments Continuing to Limit Function: Physical Therapy  strength decreased;impaired balance;impaired communication;impaired functional activity tolerance  (Pended)   -  strength decreased;impaired balance;impaired communication;impaired functional activity tolerance  -RG     Recorded by [JW] Roque Jackson PTA Student [RG] Prakash Haque PTA       User Key  (r) = Recorded By, (t) = Taken By, (c) = Cosigned By    Initials Name Effective Dates    CJ Daylin Rebolledo, OT 04/03/18 -     RG Prakash Haque PTA 10/31/19 -     Roque Swift PTA Student 02/24/20 -                  PT Recommendation and Plan        Daily Summary of Progress (PT)  Impairments Continuing to Limit Function: Physical Therapy: (P) strength decreased, impaired balance, impaired communication, impaired functional activity tolerance               Time Calculation:     PT Charges     Row Name 03/06/20 1548 03/06/20 1527          Time Calculation    Start Time  1045  (Pended)   -DESHAWN  1330  -RG     Stop Time  1130  (Pended)   -JW  1415  -RG     Time Calculation (min)  45 min  (Pended)   -JW  45 min  -RG     PT Received  On  03/06/20  (Pended)   -DESHAWN  03/06/20  -RG     PT - Next Appointment  03/06/20  (Pended)   -DESHAWN  --     PT Goal Re-Cert Due Date  03/12/20  (Pended)   -DESHAWN  03/12/20  -RG        Time Calculation- PT    Total Timed Code Minutes- PT  45 minute(s)  (Pended)   -  45 minute(s)  -       User Key  (r) = Recorded By, (t) = Taken By, (c) = Cosigned By    Initials Name Provider Type    Prakash Broussard PTA Physical Therapy Assistant    Roque Swift PTA Student JD Student          Therapy Charges for Today     Code Description Service Date Service Provider Modifiers Qty    11557486868 HC PT THER PROC EA 15 MIN 3/6/2020 Roque Jackson PTA Student GP 2    23673730322 HC GAIT TRAINING EA 15 MIN 3/6/2020 Roque Jackson PTA Student GP 1                   JD Holt  3/6/2020

## 2020-03-06 NOTE — PROGRESS NOTES
Occupational Therapy:    Physical Therapy: Individual: 90 minutes.    Speech Language Pathology:    Signed by: Prakash Haque PTA

## 2020-03-06 NOTE — THERAPY TREATMENT NOTE
Inpatient Rehabilitation - Physical Therapy Treatment Note   [facility]     Patient Name: Connie Arango  : 1940  MRN: [id number]    Today's Date: 3/6/2020                 Admit Date: 2020      Visit Dx:    No diagnosis found.    Patient Active Problem List   Diagnosis   • ICH (intracerebral hemorrhage) (CMS/HCC)       Therapy Treatment    IRF Treatment Summary     Row Name 20 1520 20 1433          Evaluation/Treatment Time and Intent    Subjective Information  no complaints  -RG  no complaints agreeable to therapy  -CJ     Existing Precautions/Restrictions  fall;seizures  -RG  fall;seizures  -CJ     Document Type  therapy note (daily note)  -RG  therapy note (daily note)  -CJ     Mode of Treatment  physical therapy  -RG  occupational therapy  -CJ     Patient/Family Observations  Pt and nursing in agreement for skilled PT.   -RG  --     Recorded by [RG] Prakash Haque PTA [CJ] Daylin Rebolledo OT     Row Name 20 1520 20 1433          Cognition/Psychosocial- PT/OT    Affect/Mental Status (Cognitive)  WFL  -RG  --     Follows Commands (Cognition)  physical/tactile prompts required  -RG  physical/tactile prompts required;repetition of directions required;verbal cues/prompting required;increased processing time needed  -CJ     Personal Safety Interventions  supervised activity;gait belt;nonskid shoes/slippers when out of bed  -RG  --     Cognitive Function (Cognitive)  safety deficit;unable/difficult to assess  -RG  safety deficit  -CJ     Attention Deficit (Cognitive)  requires cues/redirection to task  -RG  requires cues/redirection to task  -CJ     Safety Deficit (Cognitive)  safety precautions awareness;safety precautions follow-through/compliance  -RG  safety precautions awareness;awareness of need for assistance;impulsivity  -CJ     Recorded by [RG] Prakash Haque PTA [CJ] Daylin Rebolledo, OT     Row Name 20 1433             Communication Assessment/Intervention     Additional Documentation  -- aphasia  -CJ      Recorded by [CJ] Daylin Rebolledo, OT      Row Name 03/06/20 1520             Mobility    Extremity Weight-bearing Status  left lower extremity;right lower extremity  -RG      Left Lower Extremity (Weight-bearing Status)  full weight-bearing (FWB)  -RG      Right Lower Extremity (Weight-bearing Status)  full weight-bearing (FWB)  -RG      Recorded by [RG] Prakash Haque PTA      Row Name 03/06/20 1520             Bed Mobility Assessment/Treatment    Bed Mobility, Safety Issues  decreased use of arms for pushing/pulling;decreased use of legs for bridging/pushing  -RG      Assistive Device (Bed Mobility)  bed rails  -RG      Recorded by [RG] Prakash Haque PTA      Row Name 03/06/20 1520             Transfer Assessment/Treatment    Transfer Assessment/Treatment  sit-stand transfer;stand-sit transfer;stand pivot/stand step transfer  -RG      Recorded by [RG] Prakash Haque PTA      Row Name 03/06/20 1520 03/06/20 1433          Bed-Chair Transfer    Bed-Chair [place] (Transfers)  verbal cues;nonverbal cues (demo/gesture);contact guard  -RG  contact guard;verbal cues  -CJ     Assistive Device (Bed-Chair Transfers)  wheelchair  -RG  wheelchair  -CJ     Recorded by [RG] Prakash Haque PTA [CJ] Daylin Rebolledo, OT     Row Name 03/06/20 1520             Chair-Bed Transfer    Chair-Bed [place] (Transfers)  verbal cues;nonverbal cues (demo/gesture);contact guard  -RG      Assistive Device (Chair-Bed Transfers)  wheelchair  -RG      Recorded by [RG] Prakash Haque PTA      Row Name 03/06/20 1520             Sit-Stand Transfer    Sit-Stand [place] (Transfers)  verbal cues;nonverbal cues (demo/gesture);contact guard  -RG      Assistive Device (Sit-Stand Transfers)  walker, front-wheeled  -RG      Recorded by [RG] Prakash Haque PTA      Row Name 03/06/20 1520             Stand-Sit Transfer    Stand-Sit [place] (Transfers)  verbal cues;nonverbal cues  (demo/gesture);contact guard  -RG      Assistive Device (Stand-Sit Transfers)  walker, front-wheeled  -RG      Recorded by [RG] Prakash Haque PTA      Row Name 03/06/20 1520             Stand Pivot/Stand Step Transfer    Stand Pivot/Stand Step [place]  verbal cues;nonverbal cues (demo/gesture);contact guard  -RG      Assistive Device (Stand Pivot Stand Step Transfer)  walker, front-wheeled  -RG      Recorded by [RG] Prakash Haque PTA      Row Name 03/06/20 1433             Toilet Transfer    [place] Level (Toilet Transfer)  contact guard;verbal cues  -CJ      Assistive Device (Toilet Transfer)  grab bars/safety frame  -CJ      Recorded by [CJ] Daylin Rebolledo OT      Row Name 03/06/20 1520             Gait/Stairs Assessment/Training    Gait/Stairs Assessment/Training  gait/ambulation independence;gait/ambulation assistive device;distance ambulated;gait pattern;gait deviations  -RG      [place] Level (Gait)  verbal cues;nonverbal cues (demo/gesture);contact guard  -RG      Assistive Device (Gait)  walker, front-wheeled  -RG      Distance in Feet (Gait)  320'  -RG      Pattern (Gait)  step-through  -RG      Deviations/Abnormal Patterns (Gait)  dmitry decreased;stride length decreased  -RG      Bilateral Gait Deviations  forward flexed posture  -RG      Recorded by [RG] Prakash Haque PTA      Row Name 03/06/20 1520             Safety Issues, Functional Mobility    Impairments Affecting Function (Mobility)  balance;coordination;endurance/activity tolerance;strength  -RG      Recorded by [RG] Prakash Haque PTA      Row Name 03/06/20 1433             Bathing Assessment/Treatment    Bathing [place] Level  minimum assist (75% patient effort);verbal cues;nonverbal cues (demo/gesture)  -CJ      Recorded by [CJ] Daylin Rebolledo, OT      Row Name 03/06/20 1433             Upper Body Dressing Assessment/Treatment    Upper Body Dressing Task  supervision;set up assistance;verbal cues  -       Recorded by [CJ] Daylin Rebolledo, OT      Row Name 03/06/20 1433             Lower Body Dressing Assessment/Treatment    Lower Body Dressing [place] Level  minimum assist (75% patient effort);verbal cues;nonverbal cues (demo/gesture)  -CJ      Recorded by [CJ] Daylin Rebolledo, OT      Row Name 03/06/20 1433             Grooming Assessment/Treatment    Grooming [place] Level  set up;supervision;verbal cues  -CJ      Recorded by [CJ] Daylin Rebolledo, OT      Row Name 03/06/20 1433             Toileting Assessment/Treatment    Toileting [place] Level  minimum assist (75% patient effort);verbal cues  -CJ      Assistive Device Use (Toileting)  grab bar/safety frame  -CJ      Recorded by [CJ] Daylin Rebolledo, OT      Row Name 03/06/20 1520             Vision Assessment/Intervention    Visual Impairment/Limitations  corrective lenses full time  -RG      Recorded by [RG] Prakash Haque, JD      Row Name 03/06/20 1520             Pain Scale: FACES Pre/Post-Treatment    Pain: FACES Scale, Pretreatment  0-->no hurt  -RG      Pain: FACES Scale, Post-Treatment  0-->no hurt  -RG      Recorded by [RG] Prakash Haque PTA      Row Name 03/06/20 1520             Balance    Balance  static sitting balance;static standing balance;dynamic sitting balance;dynamic standing balance;standing balance activity;dynamic balance activity  -RG      Recorded by [RG] Prakash Haque, JD      Row Name 03/06/20 1520             Static Sitting Balance    Level of [place] (Unsupported Sitting, Static Balance)  supervision  -RG      Sitting Position (Unsupported Sitting, Static Balance)  sitting on edge of bed;sitting in wheelchair  -RG      Time Able to Maintain Position (Unsupported Sitting, Static Balance)  more than 5 minutes  -RG      Recorded by [RG] Prakash Haque PTA      Row Name 03/06/20 1520             Static Standing Balance    Level of [place] (Supported Standing, Static Balance)  contact guard assist  -RG       Assistive Device Utilized (Supported Standing, Static Balance)  walker, rolling  -RG      Recorded by [RG] Prakash Haque PTA      Row Name 03/06/20 1520             Dynamic Standing Balance    Level of [place], Reaches Outside Midline (Standing, Dynamic Balance)  contact guard assist  -RG      Recorded by [RG] Prakash Haque PTA      Row Name 03/06/20 1433             Upper Extremity Seated Therapeutic Exercise    Exercise Type, Seated Upper Extremity (Therapeutic Exercise)  AROM (active range of motion) BUE ther ex/act, bilat coord ex, GMC, FMC  -CJ      Expected Outcomes, Seated Upper Extremity (Therapeutic Exercise)  improve functional tolerance, self-care activity;improve performance, BADLs;improve performance, transfer skills;improve performance, gross motor coordination skills;improve performance, fine motor coordination skills  -CJ      Recorded by [CJ] Daylin Rebolledo OT      Row Name 03/06/20 1520             Lower Extremity Standing Therapeutic Exercise    Performed, Standing Lower Extremity (Therapeutic Exercise)  hip flexion/extension;hip abduction/adduction;mini-squats;heel raises H-T walking on foam pad  -RG      Device, Standing Lower Extremity (Therapeutic Exercise)  parallel bars  -RG      Exercise Type, Standing Lower Extremity (Therapeutic Exercise)  AROM (active range of motion)  -RG      Expected Outcomes, Standing Lower Extremity (Therapeutic Exercise)  improve functional tolerance, community activity;improve functional tolerance, household activity;improve functional tolerance, self-care activity;improve performance, gait skills;improve performance, transfer skills  -RG      Sets/Reps Detail, Standing Lower Extremity (Therapeutic Exercise)  20  -RG      Comment, Standing Lower Extremity (Therapeutic Exercise)  tactile cues to stay on task   -RG      Recorded by [RG] Prakash Haque PTA      Row Name 03/06/20 1520 03/06/20 1433          Positioning and Restraints    Pre-Treatment  Position  in bed  -RG  --     Post Treatment Position  bed  -RG  wheelchair  -CJ     In Bed  notified nsg;supine;call light within reach;encouraged to call for assist;exit alarm on;legs elevated  -RG  with SLP;with PT with SLP- first tx;  PT-second tx  -CJ     Recorded by [RG] Prakash Haque PTA [CJ] Daylin Rebolledo, OT     Row Name 03/06/20 1520             Daily Summary of Progress (PT)    Impairments Continuing to Limit Function: Physical Therapy  strength decreased;impaired balance;impaired communication;impaired functional activity tolerance  -RG      Recorded by [RG] Prakash Haque PTA        User Key  (r) = Recorded By, (t) = Taken By, (c) = Cosigned By    Initials Name Effective Dates    CJ Daylin Rebolledo, OT 04/03/18 -     RG Prakash Haque PTA 10/31/19 -                  PT Recommendation and Plan        Daily Summary of Progress (PT)  Impairments Continuing to Limit Function: Physical Therapy: strength decreased, impaired balance, impaired communication, impaired functional activity tolerance               Time Calculation:     PT Charges     Row Name 03/06/20 1527             Time Calculation    Start Time  1330  -RG      Stop Time  1415  -RG      Time Calculation (min)  45 min  -RG      PT Received On  03/06/20  -RG      PT Goal Re-Cert Due Date  03/12/20  -RG         Time Calculation- PT    Total Timed Code Minutes- PT  45 minute(s)  -RG        User Key  (r) = Recorded By, (t) = Taken By, (c) = Cosigned By    Initials Name Provider Type    Prakash Broussard PTA Physical Therapy Assistant          Therapy Charges for Today     Code Description Service Date Service Provider Modifiers Qty    82557910275 HC GAIT TRAINING EA 15 MIN 3/6/2020 Prakash Haque PTA GP 1    90238325462 HC PT THERAPEUTIC ACT EA 15 MIN 3/6/2020 Prakash Haque PTA GP 2                   Prakash Haque PTA  3/6/2020

## 2020-03-06 NOTE — DISCHARGE PLACEMENT REQUEST
"Les Arango (79 y.o. Female)     Date of Birth Social Security Number Address Home Phone MRN    1940  [address] [place] [address] [phone] [id number]    [employer]abapt[employer] Marital Status          Unknown        Admission Date Admission Type Admitting Provider Attending Provider Department, Room/Bed    2/26/20 Elective Sean Coppola MD [place], Wajdi Samir, MD [facility], 105/2S    Discharge Date Discharge Disposition Discharge Destination                       Attending Provider:  Sean Coppola MD    Allergies:  No Known Allergies    Isolation:  None   Infection:  None   Code Status:  CPR    Ht:  165 cm (64.96\")   Wt:  72 kg (158 lb 11.7 oz)    Admission Cmt:  None   Principal Problem:  None                Active Insurance as of 2/26/2020     Primary Coverage     Payor Plan Insurance Group Employer/Plan Group    HUMANA MEDICARE REPLACEMENT HUMANA MEDICARE REPLACEMENT [id number]     Payor Plan Address Payor Plan Phone Number Payor Plan Fax Number Effective Dates    [address] [address] [phone]  1/1/2018 - None Entered    [facility] [id number]       Subscriber Name Subscriber Birth Date Member ID       LES ARANGO 1940 [id number]                 Emergency Contacts      (Rel.) Home Phone Work Phone Mobile Phone    Brennen Sargent (Son) [phone] -- --    JETHRO REYES (Sister) [phone] -- --    JERRI REYES (Brother) [phone] -- --            Emergency Contact Information     Name Relation Home Work Mobile    Brennen Sargent Son [phone]      JETHRO REYES Sister [phone]      JERRI REYES Brother [phone]            Insurance Information                HUMANA MEDICARE REPLACEMENT/HUMANA MEDICARE REPLACEMENT Phone: [phone]    Subscriber: Les Arango Subscriber#: [id number]    Group#: [id number] Precert#:              History & Physical      Sean Coppola MD at 02/27/20 0730                   HISTORY AND " PHYSICAL        Patient Identification:  Name:  Connie Arango  Age:  79 y.o.  Sex:  female  :  1940  MRN:  [id number]   Visit Number:  [id number]  Primary Care Physician:  Provider, No Known       Subjective     Subjective     Chief complaint:     Debility    History of presenting illness:     This patient is seen today by me for post admission physician evaluation to the inpatient rehabilitation facility.    Connie Arango is an 79 year old female with history of hypertension and hyperlipidemia who presented to [facility] ([facility]) as a direct transfer from and OSH w/slurred speech and aphasia. Her LKN was the afternoon of 2020. The patient lives alone and last spoke with her siblings on  and was in her normal state of health. On  when her siblings called they found her to have slurred speech and incorrect words. She was taken to OSH where a left parietotemporal ICH was demonstrated on imaging.  On arrival to [facility] her SBP was 198 mmHg and she was on a nicardipine gtt.   She was admitted to Neuro service patient was placed in 4 point restraints due to agitation and intubated for protection of airway and for patient safety, and arterial line was placed.  Prior to intubation the patient was agitated and repeatedly tried to crawl out of hospital bed.  The etiology of ICH was suspected to be secondary to HTN.  CT reveals “intraparenchymal hemorrhage is seen and left parietotemporal region with surrounding soft tissue edema. It measures 4.3 x 2.8 x 2.3 centimeters.”  On 02/10 labs included sodium 141, K+ 4.0, WBC 14.47, Hb: 13.6, HCT: 41.4, Plt: 235 PT: 12.8, INR: 1.0, and was ordered SCDS for DVT ppx at that time.  MRI head demonstrated no significant change in size of the left temporoparietal intraparenchymal hematoma, restricted diffusion within the brain parenchyma adjacent to the hematoma, particularly posteriorly, consistent with acute infarction, and additional small  acute cortical ischemic infarcts in the left parietal lobe and right parieto-occipital region.  Patients ECHO EF 60-70% mild TR, RVSP 40-50.  CTA with possible small aneurysms at basilar tip and left ACOMM, right SCA severe stenosis, neurosurgery was consulted with no recs for surgical intervention.  Mechanism ruled likely cardioembolic with hemorrhagic conversion at left frontotemporal areas as multifocal CVAs evident on MRI are consistent with embolic etiology. Dysphagia team recommended tube feeds, and  Hal was placed.  Patient was extubated onto HFNC on .  On , patient became acutely hypotensive after getting PM meds. She had a low grade temp so she was given 1L IVF, pan cultured, started on broad spectrum antibiotics, and phenylephrine drip for a short time.  Patient was started on Vanc/Cefe/Flagyl initially and then follow cultures and if no growth plan to discontinue antibiotics on day 3.  Later in the shift her hypotensive episode resolved and early the next morning she had increasing hypoxia and agitation.  She was restarted on a precedex drip for agitation and given Lasix.  Patient placed on subcutaneous Heparin for DVT prophylaxis and 81 mg ASA daily.  On , patient weaned off of HFNC and was doing well on room air.  She is alert but remains aphasic, antibiotics were discontinued as no fever, no growth on cultures, and clinical improvement.  For intermittent agitation, she was continued on Seroquel.  CXR showed small bilateral pleural effusions which are stable, moderate on the right, small-to-moderate on the left, Prominence of central vascular pedicle, and Slightly improved right lung aeration, in part due to redistribution of layering right sided pleural effusion.  On  was initial speech evaluation: patient alert, restless, bilateral upper extremity restraints in place. DHT and HFNC in place. Patient with significant communication deficits. Receptively, she followed 2 commands,  was unable to reliably answer yes/no question (yes bias noted) unable to ID objects from a field of 2.  Expressively she was unable to complete automatic speech task or repeat.  Speech re-evaluated patient on 02/20 with diet recs puree with thin liquids, meds crushed in puree/pudding.  Feeding tube pulled and patient no longer requiring bilateral restraints.   BP improved.  Patient still noted with global aphasia.  Patient was noted to have poor po intake and was started on mirtazapine.  Labs on 02/23 included WBC 13.37, hgb 12.7, hct 39.4, Plt 415, creatinine 0.83, BUN 22, potassium 4.3, and sodium 140.      Patient continued to progress medically and PT/OT/speech evals completed which recommended acute inpatient rehab program post multifocal CVAs/left ICH for neuro and functional mobility, re-education with feeding and thought process and mobility needs. Acute inpatient rehab referral ordered for continued medical monitoring and intervention with post-acute multifocal CVAs/left ICH, continued neuro monitoring for changes or comps, anticoagulant therapy regulation, pain management needs, bowel and bladder management, dysphagia monitoring and intervention along with supervised meals and aspiration precautions, skin monitoring and breakdown prevention along with management of ongoing comorbidities that require intervention for regulation.    The preadmission mini-FIM score as assessed by the referring facility is as follows: Eating 2, Grooming 3, Bathing 2, Dressing-Upper Body 1, Dressing-Lower Body 1, Toileting 1, Transfers 1, Locomotion 1, Bowel Management 1, Bladder Management 1, Comprehension 2, Expression 2, Social Interaction 3, Memory 1, and Problem Solving 1.     Estimated length of stay is 10-14 days.     Patient is going to require intensive inpatient physical therapy for therapeutic exercises, range of motion, endurance, gait training, safety, stairs training, strengthening for at least 1 to 2 hours a day  for 5 to 6 days a week as well as occupational therapy for dressing, ADLs, feeding, home skills, safety and toileting techniques for 1 to 2 hours a day for 5 to 6 days a week, as well as speech and language pathology therapy for communication, expression, dysphasia, aspiration needs for 30 to 90 minutes a day for 5 to 6 days a week.      ---------------------------------------------------------------------------------------------------------------------     Review Of Systems:    Constitutional: no fever, chills and night sweats.  Fairly confused.  Eyes: no eye drainage, itching or redness.  HEENT: no mouth sores, dysphagia or nose bleed.  Respiratory: no for shortness of breath, cough or production of sputum.  Cardiovascular: no chest pain, no palpitations, no orthopnea.  Gastrointestinal: no nausea, vomiting or diarrhea. No abdominal pain, hematemesis or rectal bleeding.  Genitourinary: no dysuria or polyuria.  Hematologic/lymphatic: no lymph node abnormalities, no easy bruising or easy bleeding.  Musculoskeletal: no muscle or joint pain.  Skin: No rash and no itching.  Neurological: no loss of consciousness, no seizure, no headache.  Behavioral/Psych: no depression or suicidal ideation.  Endocrine: no hot flashes.  Immunologic: negative.    ---------------------------------------------------------------------------------------------------------------------     Past Medical History    Past Medical History:   Diagnosis Date   • Coronary artery disease    • GERD (gastroesophageal reflux disease)    • Hypertension    • Intracranial hemorrhage (CMS/HCC)        Past Surgical History    Past Surgical History:   Procedure Laterality Date   • CHOLECYSTECTOMY         Family History    History reviewed. No pertinent family history.    Social History    Social History     Tobacco Use   • Smoking status: Never Smoker   • Smokeless tobacco: Never Used   Substance Use Topics   • Alcohol use: Not Currently   • Drug use: Never            Allergies    Patient has no known allergies.  ---------------------------------------------------------------------------------------------------------------------     Home Medications:    Prior to Admission Medications     Prescriptions Last Dose Informant Patient Reported? Taking?    furosemide (LASIX) 20 MG tablet Unknown Pharmacy Yes No    Take 20 mg by mouth Daily.    imipramine (TOFRANIL) 10 MG tablet Unknown Pharmacy Yes No    Take 10 mg by mouth Every Morning.    metoprolol succinate XL (TOPROL-XL) 50 MG 24 hr tablet Unknown Pharmacy Yes No    Take 50 mg by mouth 2 (Two) Times a Day.    potassium chloride (K-DUR) 10 MEQ CR tablet Unknown Pharmacy Yes No    Take 10 mEq by mouth Daily.    raNITIdine (ZANTAC) 300 MG tablet Unknown Pharmacy Yes No    Take 300 mg by mouth Every Night.    simvastatin (ZOCOR) 20 MG tablet Unknown Pharmacy Yes No    Take 20 mg by mouth Every Night.        ---------------------------------------------------------------------------------------------------------------------    Objective     Objective     Hospital Scheduled Meds:    aspirin 81 mg Oral Daily   atorvastatin 40 mg Oral Nightly   carvedilol 12.5 mg Oral BID With Meals   famotidine 40 mg Oral Daily   furosemide 20 mg Oral Daily   imipramine 10 mg Oral Nightly   magnesium oxide 400 mg Oral Daily   potassium chloride 10 mEq Oral Daily        ---------------------------------------------------------------------------------------------------------------------   Vital Signs:  Temp:  [98.9 °F (37.2 °C)-99.3 °F (37.4 °C)] 98.9 °F (37.2 °C)  Heart Rate:  [83-94] 94  Resp:  [18-20] 20  BP: (143-154)/(62-72) 143/62  No data found.  SpO2 Percentage    02/26/20 1632 02/26/20 1910   SpO2: 93% 94%     SpO2:  [93 %-94 %] 94 %  on   ;   Device (Oxygen Therapy): room air    Body mass index is 26.45 kg/m².  Wt Readings from Last 3 Encounters:   02/26/20 72 kg (158 lb 11.7 oz)      ---------------------------------------------------------------------------------------------------------------------     Physical Exam:    General Appearance: alert and pleasant but seems to be fairly confused and at times not following commands.    Head: normocephalic, without obvious abnormality and atraumatic    Eyes: lids and lashes normal, conjunctivae and sclerae normal, no icterus, no  pallor, corneas clear and PERRLA    Ears: ears appear intact with no abnormalities noted    Nose: nares normal, septum midline, mucosa normal and no drainage     Throat: no oral lesions, no thrush, oral mucosa moist and oopharynx normal    Neck: no adenopathy, supple, trachea midline, no thyromegaly, no carotid bruit  and no JVD    Back: no kyphosis present, no scoliosis present, no skin lesions, erythema, or  scars, no tenderness to percussion or palpation and range of motion normal    Lungs: clear to auscultation, respirations regular, respirations even and  respirations unlabored. No accessory muscle use.     Heart:: regular rhythm & normal rate, normal S1, S2, no murmur, no gallop, no  rub and no click.  Chest wall with no abnormalities observed. PMI nondisplaced    Abdomen: normal bowel sounds in all quadrants, no masses, no hepatomegaly,  no splenomegaly, soft non-tender, no guarding and no rebound tenderness    Extremities: no edema, no cyanosis, no redness, no tenderness, no clubbing    Musculoskeletal: joints with no effusion nor erythema nor warmth.  Pedal pulses palpable and equal bilaterally    Skin: no bleeding, bruising or rash and no lesions noted    Lymph Nodes: no palpable adenopathy    Neurologic: Alert and awake.  Mild aphasia with limited vocabulary and answers at this time.   Sensory intact in BLE and BUE.   Motor strength Generalized weakness      ---------------------------------------------------------------------------------------------------------------------             Results from last 7 days   Lab  Units 02/27/20  0657   WBC 10*3/mm3 6.26   HEMOGLOBIN g/dL 12.5   HEMATOCRIT % 40.2   MCV fL 89.3   MCHC g/dL 31.1*   PLATELETS 10*3/mm3 382         Invalid input(s): PROTCrCl cannot be calculated (No successful lab value found.).  No results found for: AMMONIA    No results found for: HGBA1C, POCGLU  No results found for: HGBA1C  No results found for: TSH, FREET4    No results found for: BLOODCX  No results found for: URINECX  No results found for: WOUNDCX  No results found for: STOOLCX  No results found for: RESPCX  Pain Management Panel     There is no flowsheet data to display.        I have personally reviewed the above laboratory results.   ---------------------------------------------------------------------------------------------------------------------  Imaging Results (Last 7 Days)     ** No results found for the last 168 hours. **        I have personally reviewed the above radiology results.   ---------------------------------------------------------------------------------------------------------------------      Assessment & Plan        Assessment/Plan       ASSESSMENT:    Multifocal CVA/left parietotemporal ICH   Small acute corical ischemic infarcts in the left parietal Lobe and right parieto-occipital region  Left parietotemporal hemorrhage  Encephalopathy  Acute hypoxia respiratory failure  HTN  Hypotension  Small bilateral pleural effusions  Dysphagia  Global aphasia     PLAN:    This patient is seen today by me for post admission physician evaluation to the inpatient rehabilitation facility.    Connie Arango is an 79 year old female with history of hypertension and hyperlipidemia who presented to [facility] ([facility]) as a direct transfer from and OS w/slurred speech and aphasia. Her LKN was the afternoon of 02/08/2020. The patient lives alone and last spoke with her siblings on 02/08 and was in her normal state of health. On 02/09 when her siblings called they found her  to have slurred speech and incorrect words. She was taken to OSH where a left parietotemporal ICH was demonstrated on imaging.  On arrival to [facility] her SBP was 198 mmHg and she was on a nicardipine gtt.   She was admitted to Neuro service patient was placed in 4 point restraints due to agitation and intubated for protection of airway and for patient safety, and arterial line was placed.  Prior to intubation the patient was agitated and repeatedly tried to crawl out of hospital bed.  The etiology of ICH was suspected to be secondary to HTN.  CT reveals “intraparenchymal hemorrhage is seen and left parietotemporal region with surrounding soft tissue edema. It measures 4.3 x 2.8 x 2.3 centimeters.”  On 02/10 labs included sodium 141, K+ 4.0, WBC 14.47, Hb: 13.6, HCT: 41.4, Plt: 235 PT: 12.8, INR: 1.0, and was ordered SCDS for DVT ppx at that time.  MRI head demonstrated no significant change in size of the left temporoparietal intraparenchymal hematoma, restricted diffusion within the brain parenchyma adjacent to the hematoma, particularly posteriorly, consistent with acute infarction, and additional small acute cortical ischemic infarcts in the left parietal lobe and right parieto-occipital region.  Patients ECHO EF 60-70% mild TR, RVSP 40-50.  CTA with possible small aneurysms at basilar tip and left ACOMM, right SCA severe stenosis, neurosurgery was consulted with no recs for surgical intervention.  Mechanism ruled likely cardioembolic with hemorrhagic conversion at left frontotemporal areas as multifocal CVAs evident on MRI are consistent with embolic etiology. Dysphagia team recommended tube feeds, and  Hal was placed.  Patient was extubated onto NC on .  On , patient became acutely hypotensive after getting PM meds. She had a low grade temp so she was given 1L IVF, pan cultured, started on broad spectrum antibiotics, and phenylephrine drip for a short time.  Patient was started on Vanc/Cefe/Flagyl  initially and then follow cultures and if no growth plan to discontinue antibiotics on day 3.  Later in the shift her hypotensive episode resolved and early the next morning she had increasing hypoxia and agitation.  She was restarted on a precedex drip for agitation and given Lasix.  Patient placed on subcutaneous Heparin for DVT prophylaxis and 81 mg ASA daily.  On 02/14, patient weaned off of HFNC and was doing well on room air.  She is alert but remains aphasic, antibiotics were discontinued as no fever, no growth on cultures, and clinical improvement.  For intermittent agitation, she was continued on Seroquel.  CXR showed small bilateral pleural effusions which are stable, moderate on the right, small-to-moderate on the left, Prominence of central vascular pedicle, and Slightly improved right lung aeration, in part due to redistribution of layering right sided pleural effusion.  On 02/13 was initial speech evaluation: patient alert, restless, bilateral upper extremity restraints in place. DHT and HFNC in place. Patient with significant communication deficits. Receptively, she followed 2 commands, was unable to reliably answer yes/no question (yes bias noted) unable to ID objects from a field of 2.  Expressively she was unable to complete automatic speech task or repeat.  Speech re-evaluated patient on 02/20 with diet recs puree with thin liquids, meds crushed in puree/pudding.  Feeding tube pulled and patient no longer requiring bilateral restraints.   BP improved.  Patient still noted with global aphasia.  Patient was noted to have poor po intake and was started on mirtazapine.  Labs on 02/23 included WBC 13.37, hgb 12.7, hct 39.4, Plt 415, creatinine 0.83, BUN 22, potassium 4.3, and sodium 140.      Patient continued to progress medically and PT/OT/speech evals completed which recommended acute inpatient rehab program post multifocal CVAs/left ICH for neuro and functional mobility, re-education with feeding  and thought process and mobility needs. Acute inpatient rehab referral ordered for continued medical monitoring and intervention with post-acute multifocal CVAs/left ICH, continued neuro monitoring for changes or comps, anticoagulant therapy regulation, pain management needs, bowel and bladder management, dysphagia monitoring and intervention along with supervised meals and aspiration precautions, skin monitoring and breakdown prevention along with management of ongoing comorbidities that require intervention for regulation.      The preadmission mini-FIM score as assessed by the referring facility is as follows: Eating 2, Grooming 3, Bathing 2, Dressing-Upper Body 1, Dressing-Lower Body 1, Toileting 1, Transfers 1, Locomotion 1, Bowel Management 1, Bladder Management 1, Comprehension 2, Expression 2, Social Interaction 3, Memory 1, and Problem Solving 1.     Estimated length of stay is 10-14 days.       Patient is going to require intensive inpatient physical therapy for therapeutic exercises, range of motion, endurance, gait training, safety, stairs training, strengthening for at least 1 to 2 hours a day for 5 to 6 days a week as well as occupational therapy for dressing, ADLs, feeding, home skills, safety and toileting techniques for 1 to 2 hours a day for 5 to 6 days a week, as well as speech and language pathology therapy for communication, expression, dysphasia, aspiration needs for 30 to 90 minutes a day for 5 to 6 days a week.    Patient's findings and recommendations were discussed with patient himself, referring facility, intake nurse and nursing staff.      Code Status:   Code Status and Medical Interventions:   Ordered at: 02/26/20 1948     Code Status:    CPR     Medical Interventions (Level of Support Prior to Arrest):    Full         Sean Coppola MD  02/27/20  7:33 AM          Electronically signed by Sean Coppola MD at 02/27/20 0738         Current Facility-Administered Medications      Medication Dose Route Frequency Provider Last Rate Last Dose   • acetaminophen (TYLENOL) tablet 650 mg  650 mg Oral Q4H PRN Sean Coppola MD       • aspirin chewable tablet 81 mg  81 mg Oral Daily Sean Coppola MD   81 mg at 03/06/20 0836   • atorvastatin (LIPITOR) tablet 40 mg  40 mg Oral Nightly Sean Coppola MD   40 mg at 03/05/20 2047   • bisacodyl (DULCOLAX) EC tablet 5 mg  5 mg Oral Daily PRN Sean Coppola MD   5 mg at 02/29/20 0547   • calcium carbonate (TUMS) chewable tablet 500 mg (200 mg elemental)  1 tablet Oral BID PRN Sean Coppola MD       • carvedilol (COREG) tablet 12.5 mg  12.5 mg Oral BID With Meals Sean Coppola MD   12.5 mg at 03/06/20 0836   • famotidine (PEPCID) tablet 40 mg  40 mg Oral Daily Sean Coppola MD   40 mg at 03/06/20 0836   • furosemide (LASIX) tablet 20 mg  20 mg Oral Daily Sean Coppola MD   20 mg at 03/06/20 0836   • imipramine (TOFRANIL) tablet 10 mg  10 mg Oral Nightly Sean Coppola MD   10 mg at 03/05/20 2047   • magnesium hydroxide (MILK OF MAGNESIA) suspension 2400 mg/10mL 10 mL  10 mL Oral Daily PRN Sean Coppola MD   10 mL at 03/04/20 0452   • magnesium oxide (MAG-OX) tablet 400 mg  400 mg Oral Daily Sean Coppola MD   400 mg at 03/06/20 1001   • ondansetron (ZOFRAN) tablet 4 mg  4 mg Oral Q6H PRN Sean Coppola MD       • polyethylene glycol 3350 powder (packet)  17 g Oral Daily PRN Sean Coppola MD   17 g at 03/03/20 1336   • potassium chloride (K-DUR,KLOR-CON) ER tablet 10 mEq  10 mEq Oral Daily Sean Coppola MD   10 mEq at 03/06/20 0836     Lab Results (last 72 hours)     Procedure Component Value Units Date/Time    Comprehensive Metabolic Panel [[id number]]  (Abnormal) Collected:  03/06/20 0152    Specimen:  Blood Updated:  03/06/20 0233     Glucose 101 mg/dL      BUN 11 mg/dL      Creatinine 0.74 mg/dL      Sodium 142 mmol/L      Potassium 3.9 mmol/L      Chloride 106  mmol/L      CO2 23.7 mmol/L      Calcium 9.2 mg/dL      Total Protein 5.7 g/dL      Albumin 3.16 g/dL      ALT (SGPT) 26 U/L      AST (SGOT) 34 U/L      Alkaline Phosphatase 73 U/L      Total Bilirubin 0.4 mg/dL      eGFR Non African Amer 76 mL/min/1.73      Globulin 2.5 gm/dL      A/G Ratio 1.2 g/dL      BUN/Creatinine Ratio 14.9     Anion Gap 12.3 mmol/L     Narrative:       GFR Normal >60  Chronic Kidney Disease <60  Kidney Failure <15      Magnesium [[id number]]  (Normal) Collected:  03/06/20 0152    Specimen:  Blood Updated:  03/06/20 0232     Magnesium 2.3 mg/dL     CBC & Differential [[id number]] Collected:  03/06/20 0152    Specimen:  Blood Updated:  03/06/20 0212    Narrative:       The following orders were created for panel order CBC & Differential.  Procedure                               Abnormality         Status                     ---------                               -----------         ------                     CBC Auto Differential[[id number]]        Abnormal            Final result                 Please view results for these tests on the individual orders.    CBC Auto Differential [[id number]]  (Abnormal) Collected:  03/06/20 0152    Specimen:  Blood Updated:  03/06/20 0212     WBC 4.80 10*3/mm3      RBC 4.00 10*6/mm3      Hemoglobin 11.2 g/dL      Hematocrit 35.6 %      MCV 89.0 fL      MCH 28.0 pg      MCHC 31.5 g/dL      RDW 14.1 %      RDW-SD 45.4 fl      MPV 11.6 fL      Platelets 193 10*3/mm3      Neutrophil % 53.5 %      Lymphocyte % 27.7 %      Monocyte % 16.7 %      Eosinophil % 1.7 %      Basophil % 0.2 %      Immature Grans % 0.2 %      Neutrophils, Absolute 2.57 10*3/mm3      Lymphocytes, Absolute 1.33 10*3/mm3      Monocytes, Absolute 0.80 10*3/mm3      Eosinophils, Absolute 0.08 10*3/mm3      Basophils, Absolute 0.01 10*3/mm3      Immature Grans, Absolute 0.01 10*3/mm3      nRBC 0.0 /100 WBC           Diet Orders (active) (From admission, onward)     Start     Ordered    02/26/20    Diet Pureed; Thin  Diet Effective Now      20                   Physician Progress Notes (last 48 hours) (Notes from 20 1415 through 20 1415)      Sean Coppola MD at 20 0645                     PROGRESS NOTE         Patient Identification:  Name:  Connie Arango  Age:  79 y.o.  Sex:  female  :  1940  MRN:  [id number]  Visit Number:  [id number]  Primary Care Provider:  Luis Enrique Prieto MD         LOS: 9 days       ----------------------------------------------------------------------------------------------------------------------  Subjective       Chief Complaints:    Intracranial hemorrhage   Encephalopathy   debility      Interval History:       Vitals are stable with pulse ox 94% on room air no fever overnight labs as of this morning on 3/6/2020 with a creatinine of 0.74 potassium of 3.9 magnesium 2.3 normal white count and stable hemoglobin at 11.2.  Patient denies any chest pain shortness of breath nausea vomiting or diarrhea.    Patient participated with occupational therapy, speech therapy, and physical therapy on 3/5/20. Patient's mobility is at full weight-bearing on both sides. Bed-chair transfer is at contact guard with verbal and nonverbal cues and using a wheelchair. Stand-sit transfer is at contact guard with verbal and nonverbal cues using a front-wheeled walker. Stand pivot/stand step transfer is at contact guard with verbal and nonverbal cues using a front-wheeled walker. Patient ambulated 300 feet x2 at contact guard with verbal and nonverbal cues using a front-wheeled walker.        Review of Systems:    Constitutional: no fever, chills and night sweats.  Eyes: no eye drainage, itching or redness.  HEENT: no mouth sores, dysphagia or nose bleed.  Respiratory: no for shortness of breath, cough or production of sputum.  Cardiovascular: no chest pain, no palpitations, no orthopnea.  Gastrointestinal: no nausea, vomiting or diarrhea. No abdominal  pain, hematemesis or rectal bleeding.  Genitourinary: no dysuria or polyuria.  Hematologic/lymphatic: no lymph node abnormalities, no easy bruising or easy bleeding.  Musculoskeletal: no muscle or joint pain.  Skin: No rash and no itching.  Neurological: no loss of consciousness, no seizure, no headache.  Behavioral/Psych: no depression or suicidal ideation.  Endocrine: no hot flashes.  Immunologic: negative.  ----------------------------------------------------------------------------------------------------------------------      Objective       [facility] Meds:    aspirin 81 mg Oral Daily   atorvastatin 40 mg Oral Nightly   carvedilol 12.5 mg Oral BID With Meals   famotidine 40 mg Oral Daily   furosemide 20 mg Oral Daily   imipramine 10 mg Oral Nightly   magnesium oxide 400 mg Oral Daily   potassium chloride 10 mEq Oral Daily        ----------------------------------------------------------------------------------------------------------------------    Vital Signs:  Temp:  [98.4 °F (36.9 °C)] 98.4 °F (36.9 °C)  Heart Rate:  [76] 76  Resp:  [18] 18  BP: (138)/(72) 138/72  No data found.  SpO2 Percentage    03/04/20 0705 03/04/20 1914 03/05/20 1911   SpO2: 95% 94% 94%     SpO2:  [94 %] 94 %  on   ;   Device (Oxygen Therapy): room air    Body mass index is 26.45 kg/m².  Wt Readings from Last 3 Encounters:   02/26/20 72 kg (158 lb 11.7 oz)        Intake/Output Summary (Last 24 hours) at 3/6/2020 0723  Last data filed at 3/6/2020 0513  Gross per 24 hour   Intake 960 ml   Output --   Net 960 ml     Diet Pureed; Thin  ----------------------------------------------------------------------------------------------------------------------      Physical Exam:     General Appearance: alert and pleasant.  Slightly confused this morning.    Head: normocephalic, without obvious abnormality and atraumatic     Eyes: lids and lashes normal, conjunctivae and sclerae normal, no icterus, no pallor, corneas clear and  PERRLA     Ears: ears appear intact with no abnormalities noted     Nose: nares normal, septum midline, mucosa normal and no drainage                Throat: no oral lesions, no thrush, oral mucosa moist and oopharynx normal     Neck: no adenopathy, supple, trachea midline, no thyromegaly, no carotid bruit and no JVD     Back: no kyphosis present, no scoliosis present, no skin lesions, erythema, or scars, no tenderness to percussion or palpation and range of motion normal     Lungs: clear to auscultation, respirations regular, respirations even and  respirations unlabored. No accessory muscle use.      Heart:: regular rhythm & normal rate, normal S1, S2, no murmur, no gallop, no rub and no click.  Chest wall with no abnormalities observed. PMI nondisplaced     Abdomen: normal bowel sounds in all quadrants, no masses, no hepatomegaly, no splenomegaly, soft non-tender, no guarding and no rebound tenderness     Extremities: no edema, no cyanosis, no redness, no tenderness, no clubbing     Musculoskeletal: joints with no effusion nor erythema nor warmth.  Pedal pulses palpable and equal bilaterally     Skin: no bleeding, bruising or rash and no lesions noted     Lymph Nodes: no palpable adenopathy     Neurologic: Alert and awake.  Mild aphasia.              Sensory intact in BLE and BUE.              Motor strength Generalized weakness  ----------------------------------------------------------------------------------------------------------------------            Results from last 7 days   Lab Units 03/06/20  0152   WBC 10*3/mm3 4.80   HEMOGLOBIN g/dL 11.2*   HEMATOCRIT % 35.6   MCV fL 89.0   MCHC g/dL 31.5   PLATELETS 10*3/mm3 193     Results from last 7 days   Lab Units 03/06/20  0152   SODIUM mmol/L 142   POTASSIUM mmol/L 3.9   MAGNESIUM mg/dL 2.3   CHLORIDE mmol/L 106   CO2 mmol/L 23.7   BUN mg/dL 11   CREATININE mg/dL 0.74   EGFR IF NONAFRICN AM mL/min/1.73 76   CALCIUM mg/dL 9.2   GLUCOSE mg/dL 101*   ALBUMIN g/dL  3.16*   BILIRUBIN mg/dL 0.4   ALK PHOS U/L 73   AST (SGOT) U/L 34*   ALT (SGPT) U/L 26   Estimated Creatinine Clearance: 55.7 mL/min (by C-G formula based on SCr of 0.74 mg/dL).  No results found for: AMMONIA    No results found for: HGBA1C, POCGLU  No results found for: HGBA1C  No results found for: TSH, FREET4    No results found for: BLOODCX  No results found for: URINECX  No results found for: WOUNDCX  No results found for: STOOLCX  No results found for: RESPCX  Pain Management Panel     There is no flowsheet data to display.            ----------------------------------------------------------------------------------------------------------------------  Imaging Results (Last 24 Hours)     ** No results found for the last 24 hours. **          ----------------------------------------------------------------------------------------------------------------------    Assessment/Plan       Assessment/Plan     ASSESSMENT:    Multifocal CVA/left parietotemporal ICH   Small acute corical ischemic infarcts in the left parietal Lobe and right parieto-occipital region  Left parietotemporal hemorrhage  Encephalopathy  Acute hypoxia respiratory failure  HTN  Hypotension  Small bilateral pleural effusions  Dysphagia  Global aphasia     PLAN:     Vitals are stable with pulse ox 94% on room air no fever overnight labs as of this morning on 3/6/2020 with a creatinine of 0.74 potassium of 3.9 magnesium 2.3 normal white count and stable hemoglobin at 11.2.  Patient denies any chest pain shortness of breath nausea vomiting or diarrhea.    Patient participated with occupational therapy, speech therapy, and physical therapy on 3/5/20. Patient's mobility is at full weight-bearing on both sides. Bed-chair transfer is at contact guard with verbal and nonverbal cues and using a wheelchair. Stand-sit transfer is at contact guard with verbal and nonverbal cues using a front-wheeled walker. Stand pivot/stand step transfer is at contact  guard with verbal and nonverbal cues using a front-wheeled walker. Patient ambulated 300 feet x2 at contact guard with verbal and nonverbal cues using a front-wheeled walker.        Code Status:   Code Status and Medical Interventions:   Ordered at: 20     Code Status:    CPR     Medical Interventions (Level of Support Prior to Arrest):    Full       Sean Coppola MD  20  7:23 AM          Electronically signed by Sean Coppola MD at 20 0725     Progress Notes filed by Provider, No Known at 20 5758        Occupational Therapy:    Physical Therapy:    Speech Language Pathology: Individual: 50 minutes.    Signed by: Alexus Yanes Speech therapist      Electronically signed by Interface, Transcription Incoming at 20 1583     Sean Coppola MD at 20 0530                     PROGRESS NOTE         Patient Identification:  Name:  Connie Arango  Age:  79 y.o.  Sex:  female  :  1940  MRN:  [id number]  Visit Number:  [id number]  Primary Care Provider:  Luis Enrique Prieto MD         LOS: 8 days       ----------------------------------------------------------------------------------------------------------------------  Subjective       Chief Complaints:    Intracranial hemorrhage   Encephalopathy   debility      Interval History:      Patient had an uneventful night and had some complaint of insomnia earlier through the night but then slept good otherwise.  I discussed with her the possibility of adjusting her sleeping medication regimen if needed.  Labs ordered for a.m. to include CBC CMP.    Patient participated with speech therapy, occupational therapy, and physical therapy on 3/4/20. Toilet transfer training is at contact guard with verbal and nonverbal cues and using a wheelchair, grab bars, and safety frame. Grooming assessment is at minimum assist with verbal and nonverbal cues. She uses the sink side and supported sitting. Toileting assessment is at  minimum assist with verbal and nonverbal cues using the grab bars and safety frame. Bathing goal is at moderate assist and projected to meed this goal by discharge.     Patient participated with speech therapy, occupational therapy, and physical therapy on 3/3/20. Bed-chair transfer is at contact guard with verbal and nonverbal cues using a wheelchair. Chair-bed transfer is at contact guard with verbal and nonverbal cues and using a wheelchair. Sit-stand transfer is at contact guard with verbal and nonverbal cues and using a front-wheeled walker. Stand sit is at contact guard with verbal and nonverbal cues and using a front-wheeled walker. Patient ambulated 320 feet in the AM and 320 feet in the PM at contact guard with verbal and nonverbal cues and using a front-wheeled walker.       Review of Systems:    Constitutional: no fever, chills and night sweats.  Eyes: no eye drainage, itching or redness.  HEENT: no mouth sores, dysphagia or nose bleed.  Respiratory: no for shortness of breath, cough or production of sputum.  Cardiovascular: no chest pain, no palpitations, no orthopnea.  Gastrointestinal: no nausea, vomiting or diarrhea. No abdominal pain, hematemesis or rectal bleeding.  Genitourinary: no dysuria or polyuria.  Hematologic/lymphatic: no lymph node abnormalities, no easy bruising or easy bleeding.  Musculoskeletal: no muscle or joint pain.  Skin: No rash and no itching.  Neurological: no loss of consciousness, no seizure, no headache.  Behavioral/Psych: no depression or suicidal ideation.  Endocrine: no hot flashes.  Immunologic: negative.  ----------------------------------------------------------------------------------------------------------------------      Objective       [facility] Meds:    aspirin 81 mg Oral Daily   atorvastatin 40 mg Oral Nightly   carvedilol 12.5 mg Oral BID With Meals   famotidine 40 mg Oral Daily   furosemide 20 mg Oral Daily   imipramine 10 mg Oral Nightly   magnesium  oxide 400 mg Oral Daily   potassium chloride 10 mEq Oral Daily        ----------------------------------------------------------------------------------------------------------------------    Vital Signs:  Temp:  [97.5 °F (36.4 °C)-98.7 °F (37.1 °C)] 98.7 °F (37.1 °C)  Heart Rate:  [63-75] 75  Resp:  [18-20] 20  BP: (110-122)/(56-65) 115/56  No data found.  SpO2 Percentage    03/03/20 1911 03/04/20 0705 03/04/20 1914   SpO2: 94% 95% 94%     SpO2:  [94 %-95 %] 94 %  on   ;   Device (Oxygen Therapy): room air    Body mass index is 26.45 kg/m².  Wt Readings from Last 3 Encounters:   02/26/20 72 kg (158 lb 11.7 oz)        Intake/Output Summary (Last 24 hours) at 3/5/2020 0607  Last data filed at 3/5/2020 0454  Gross per 24 hour   Intake 960 ml   Output --   Net 960 ml     Diet Pureed; Thin  ----------------------------------------------------------------------------------------------------------------------      Physical Exam:     General Appearance: alert and pleasant.  Aside from some insomnia seems to be doing great with no acute distress this morning    Head: normocephalic, without obvious abnormality and atraumatic     Eyes: lids and lashes normal, conjunctivae and sclerae normal, no icterus, no pallor, corneas clear and PERRLA     Ears: ears appear intact with no abnormalities noted     Nose: nares normal, septum midline, mucosa normal and no drainage                Throat: no oral lesions, no thrush, oral mucosa moist and oopharynx normal     Neck: no adenopathy, supple, trachea midline, no thyromegaly, no carotid bruit and no JVD     Back: no kyphosis present, no scoliosis present, no skin lesions, erythema, or scars, no tenderness to percussion or palpation and range of motion normal     Lungs: clear to auscultation, respirations regular, respirations even and  respirations unlabored. No accessory muscle use.      Heart:: regular rhythm & normal rate, normal S1, S2, no murmur, no gallop, no rub and no click.   Chest wall with no abnormalities observed. PMI nondisplaced     Abdomen: normal bowel sounds in all quadrants, no masses, no hepatomegaly, no splenomegaly, soft non-tender, no guarding and no rebound tenderness     Extremities: no edema, no cyanosis, no redness, no tenderness, no clubbing     Musculoskeletal: joints with no effusion nor erythema nor warmth.  Pedal pulses palpable and equal bilaterally     Skin: no bleeding, bruising or rash and no lesions noted     Lymph Nodes: no palpable adenopathy     Neurologic: Alert and awake.  Mild aphasia.              Sensory intact in BLE and BUE.              Motor strength Generalized weakness  ----------------------------------------------------------------------------------------------------------------------            Results from last 7 days   Lab Units 02/27/20  0657   WBC 10*3/mm3 6.26   HEMOGLOBIN g/dL 12.5   HEMATOCRIT % 40.2   MCV fL 89.3   MCHC g/dL 31.1*   PLATELETS 10*3/mm3 382     Results from last 7 days   Lab Units 02/27/20  0657   SODIUM mmol/L 140   POTASSIUM mmol/L 4.2   MAGNESIUM mg/dL 2.2   CHLORIDE mmol/L 104   CO2 mmol/L 24.5   BUN mg/dL 22   CREATININE mg/dL 0.85   EGFR IF NONAFRICN AM mL/min/1.73 64   CALCIUM mg/dL 9.8   GLUCOSE mg/dL 97   ALBUMIN g/dL 3.23*   BILIRUBIN mg/dL 0.6   ALK PHOS U/L 92   AST (SGOT) U/L 30   ALT (SGPT) U/L 32   Estimated Creatinine Clearance: 52.4 mL/min (by C-G formula based on SCr of 0.85 mg/dL).  No results found for: AMMONIA    No results found for: HGBA1C, POCGLU  No results found for: HGBA1C  No results found for: TSH, FREET4    No results found for: BLOODCX  No results found for: URINECX  No results found for: WOUNDCX  No results found for: STOOLCX  No results found for: RESPCX  Pain Management Panel     There is no flowsheet data to display.            ----------------------------------------------------------------------------------------------------------------------  Imaging Results (Last 24 Hours)     ** No  results found for the last 24 hours. **          ----------------------------------------------------------------------------------------------------------------------    Assessment/Plan       Assessment/Plan     ASSESSMENT:    Multifocal CVA/left parietotemporal ICH   Small acute corical ischemic infarcts in the left parietal Lobe and right parieto-occipital region  Left parietotemporal hemorrhage  Encephalopathy  Acute hypoxia respiratory failure  HTN  Hypotension  Small bilateral pleural effusions  Dysphagia  Global aphasia     PLAN:    Patient had an uneventful night and had some complaint of insomnia earlier through the night but then slept good otherwise.  I discussed with her the possibility of adjusting her sleeping medication regimen if needed.  Labs ordered for a.m. to include CBC CMP.    Patient participated with speech therapy, occupational therapy, and physical therapy on 3/4/20. Toilet transfer training is at contact guard with verbal and nonverbal cues and using a wheelchair, grab bars, and safety frame. Grooming assessment is at minimum assist with verbal and nonverbal cues. She uses the sink side and supported sitting. Toileting assessment is at minimum assist with verbal and nonverbal cues using the grab bars and safety frame. Bathing goal is at moderate assist and projected to meed this goal by discharge.     Patient participated with speech therapy, occupational therapy, and physical therapy on 3/3/20. Bed-chair transfer is at contact guard with verbal and nonverbal cues using a wheelchair. Chair-bed transfer is at contact guard with verbal and nonverbal cues and using a wheelchair. Sit-stand transfer is at contact guard with verbal and nonverbal cues and using a front-wheeled walker. Stand sit is at contact guard with verbal and nonverbal cues and using a front-wheeled walker. Patient ambulated 320 feet in the AM and 320 feet in the PM at contact guard with verbal and nonverbal cues and using a  front-wheeled walker.      Code Status:   Code Status and Medical Interventions:   Ordered at: 20 1948     Code Status:    CPR     Medical Interventions (Level of Support Prior to Arrest):    Full       Sean Coppola MD  20  6:07 AM          Electronically signed by Sean Coppola MD at 20 0609     Progress Notes filed by Provider, No Known at 20 1516        Occupational Therapy:    Physical Therapy: Individual: 100 minutes.    Speech Language Pathology:    Signed by: Prakash Haque PTA      Electronically signed by Interface, Transcription Incoming at 20 1516       Consult Notes (most recent note)    No notes of this type exist for this encounter.         Nutrition Notes (most recent note)    No notes exist for this encounter.            Physical Therapy Notes (most recent note)      Progress Notes signed by Ellen Esparza, PT at 20 1630   Version 1 of 1       Occupational Therapy:    Physical Therapy: Individual: 90 minutes.    Speech Language Pathology:    Signed by: Ellen Esparza, Physical Therapist      Electronically signed by Ellen Esparza, PT at 20 1630          Occupational Therapy Notes (most recent note)      Suha Marinelli, OT at 20 1701          Inpatient Rehabilitation - Occupational Therapy Progress Note    [facility]     Patient Name: Connie Arango  : 1940  MRN: [id number]    Today's Date: 3/5/2020                 Admit Date: 2020      Visit Dx:  No diagnosis found.    Patient Active Problem List   Diagnosis   • ICH (intracerebral hemorrhage) (CMS/[facility])         Therapy Treatment    IRF Treatment Summary     Row Name 20 1648 20 1531          Evaluation/Treatment Time and Intent    Subjective Information  no complaints  -AB  no complaints  -AG     Existing Precautions/Restrictions  fall;seizures;other (see comments) aphasia  -AB  fall;seizures  -AG     Document Type  progress note/recertification;therapy note  (daily note)  -AB  therapy note (daily note)  -AG     Mode of Treatment  occupational therapy  -AB  physical therapy  -AG     Patient/Family Observations  Pt. could only complete 25 mins OT tx this date d/t c/o of pain in R side/stomach w/ RN Linsey notified.   -AB  pt. supine; alert and cooperative; expressive aphasia noted.   -AG     Unable to Perform (Evaluation/Treatment)  -- pt. declined to complete tx session w/ NSG notified.  -AB  --     Recorded by [AB] Suha Marinelli, OT [AG] Ellen Esparza, PT     Row Name 03/05/20 1648 03/05/20 1531          Cognition/Psychosocial- PT/OT    Affect/Mental Status (Cognitive)  -- aphasia  -AB  WFL  -AG     Follows Commands (Cognition)  physical/tactile prompts required;verbal cues/prompting required;repetition of directions required;increased processing time needed  -AB  --     Personal Safety Interventions  fall prevention program maintained;gait belt;nonskid shoes/slippers when out of bed;supervised activity  -AB  --     Attention Deficit (Cognitive)  requires cues/redirection to task  -AB  --     Additional Documentation  --  -- expressive aphasia  -AG     Recorded by [AB] Suha Marinelli, OT [AG] Ellen Esparza, PT     Row Name 03/05/20 1531             Mobility    Extremity Weight-bearing Status  left lower extremity;right lower extremity  -AG      Left Lower Extremity (Weight-bearing Status)  full weight-bearing (FWB)  -AG      Right Lower Extremity (Weight-bearing Status)  full weight-bearing (FWB)  -AG      Recorded by [AG] Ellen Esparza, PT      Row Name 03/05/20 1531             Bed Mobility Assessment/Treatment    Bed Mobility, Safety Issues  decreased use of arms for pushing/pulling;decreased use of legs for bridging/pushing  -AG      Assistive Device (Bed Mobility)  bed rails  -AG      Recorded by [AG] Ellen Esparza, PT      Row Name 03/05/20 1531             Transfer Assessment/Treatment    Transfer Assessment/Treatment  sit-stand  transfer;stand-sit transfer;stand pivot/stand step transfer  -AG      Recorded by [AG] Ellen Esparza, PT      Row Name 03/05/20 1531             Bed-Chair Transfer    Bed-Chair [place] (Transfers)  verbal cues;nonverbal cues (demo/gesture);contact guard  -AG      Assistive Device (Bed-Chair Transfers)  wheelchair  -AG      Recorded by [AG] Ellen Esparza, PT      Row Name 03/05/20 1648 03/05/20 1531          Chair-Bed Transfer    Chair-Bed [place] (Transfers)  contact guard;verbal cues  -AB  verbal cues;nonverbal cues (demo/gesture);contact guard  -AG     Assistive Device (Chair-Bed Transfers)  wheelchair  -AB  wheelchair  -AG     Recorded by [AB] Suha Marinelli OT [AG] Ellen Esparza, PT     Row Name 03/05/20 1531             Sit-Stand Transfer    Sit-Stand [place] (Transfers)  verbal cues;nonverbal cues (demo/gesture);contact guard  -AG      Assistive Device (Sit-Stand Transfers)  walker, front-wheeled  -AG      Recorded by [AG] Ellen Esparza, PT      Row Name 03/05/20 1531             Stand-Sit Transfer    Stand-Sit [place] (Transfers)  verbal cues;nonverbal cues (demo/gesture);contact guard  -AG      Assistive Device (Stand-Sit Transfers)  walker, front-wheeled  -AG      Recorded by [AG] Ellen Esparza, PT      Row Name 03/05/20 1531             Stand Pivot/Stand Step Transfer    Stand Pivot/Stand Step [place]  verbal cues;nonverbal cues (demo/gesture);contact guard  -AG      Assistive Device (Stand Pivot Stand Step Transfer)  walker, front-wheeled  -AG      Recorded by [AG] Ellen Esparza, PT      Row Name 03/05/20 1531             Gait/Stairs Assessment/Training    Gait/Stairs Assessment/Training  gait/ambulation independence;gait/ambulation assistive device;distance ambulated;gait pattern;gait deviations  -AG      [place] Level (Gait)  verbal cues;nonverbal cues (demo/gesture);contact guard  -AG      Assistive Device (Gait)  walker, front-wheeled  -AG      Distance in  Feet (Gait)  300 x 2  -AG      Pattern (Gait)  step-through  -AG      Deviations/Abnormal Patterns (Gait)  dmitry decreased;stride length decreased  -AG      Bilateral Gait Deviations  forward flexed posture  -AG      Recorded by [AG] Ellen Esparza, PT      Row Name 03/05/20 1531             Safety Issues, Functional Mobility    Safety Issues Affecting Function (Mobility)  insight into deficits/self awareness;safety precaution awareness;safety precautions follow-through/compliance  -AG      Impairments Affecting Function (Mobility)  balance;coordination;endurance/activity tolerance;strength  -AG      Recorded by [AG] Ellen Esparza, PT      Row Name 03/05/20 1648             Bathing Assessment/Treatment    Comment (Bathing)  ModA  -AB      Recorded by [AB] Suha Marinelli, OT      Row Name 03/05/20 1648             Upper Body Dressing Assessment/Treatment    Comment (Upper Body Dressing)  ModA/Sb  -AB      Recorded by [AB] Suha Marinelli, OT      Row Name 03/05/20 1648             Lower Body Dressing Assessment/Treatment    Comment (Lower Body Dressing)  MaxA/ModA  -AB      Recorded by [AB] Suha Marinelli, OT      Row Name 03/05/20 1648             Grooming Assessment/Treatment    Comment (Grooming)  Sb  -AB      Recorded by [AB] Suha Marinelli, OT      Row Name 03/05/20 1648             Toileting Assessment/Treatment    Comment (Toileting)  Sb  -AB      Recorded by [AB] Suha Marinelli, OT      Row Name 03/05/20 1648             Self-Feeding Assessment/Treatment    Comment (Self-Feeding)  Set-Up  -AB      Recorded by [AB] Suha Marinelli OT      Row Name 03/05/20 1648 03/05/20 1531          Vision Assessment/Intervention    Visual Impairment/Limitations  corrective lenses full time  -AB  corrective lenses full time  -AG     Recorded by [AB] Suha Marinelli OT [AG] Ellen Esparza, PT     Row Name 03/05/20 1531             Pain Scale: FACES  Pre/Post-Treatment    Pain: FACES Scale, Pretreatment  0-->no hurt  -AG      Pain: FACES Scale, Post-Treatment  0-->no hurt  -AG      Recorded by [AG] Ellen Esparza, PT      Row Name 03/05/20 1531             Balance    Additional Documentation  Balance (Row)  -AG      Recorded by [AG] Ellen Esparza, PT      Row Name 03/05/20 1531             Static Sitting Balance    Level of [place] (Unsupported Sitting, Static Balance)  supervision  -AG      Sitting Position (Unsupported Sitting, Static Balance)  sitting on edge of bed;sitting in wheelchair  -AG      Time Able to Maintain Position (Unsupported Sitting, Static Balance)  more than 5 minutes  -AG      Recorded by [AG] Ellen Esparza, PT      Row Name 03/05/20 1531             Dynamic Sitting Balance    Level of [place], Reaches Outside Midline (Sitting, Dynamic Balance)  contact guard assist;standby assist  -AG      Recorded by [AG] Ellen Esparza, PT      Row Name 03/05/20 1531             Static Standing Balance    Level of [place] (Supported Standing, Static Balance)  contact guard assist;standby assist  -AG      Assistive Device Utilized (Supported Standing, Static Balance)  walker, rolling  -AG      Recorded by [AG] Ellen Esparza, PT      Row Name 03/05/20 1531             Dynamic Standing Balance    Level of [place], Reaches Outside Midline (Standing, Dynamic Balance)  contact guard assist  -AG      Lower Extremity Device, Reaches Outside Midline (Standing, Dynamic Balance)  -- RW  -AG      Recorded by [AG] Ellen Esparza, PT      Row Name 03/05/20 1531             Standing Balance Activity    Activities Performed (Standing, Balance Training)  standing on foam roll  -AG      Support Needed for Balance (Standing, Balance Training)  uses at least one upper extremity for support;CGA  -AG      Recorded by [AG] Ellen Esparza, PT      Row Name 03/05/20 1531             Therapeutic Exercise    Therapeutic Exercise  seated, lower  extremities;standing, lower extremities  -AG      Additional Documentation  Therapeutic Exercise (Row)  -AG      Recorded by [AG] Ellen Esparza, PT      Row Name 03/05/20 1648             Upper Extremity Seated Therapeutic Exercise    Device, Seated Upper Extremity (Therapeutic Exercise)  restorator/arm bike [id number], 0 resistance  -AB      Exercise Type, Seated Upper Extremity (Therapeutic Exercise)  AROM (active range of motion) BUE G/FMC TherEx/Act; strengthening  -AB      Expected Outcomes, Seated Upper Extremity (Therapeutic Exercise)  improve functional tolerance, self-care activity;improve performance, BADLs;improve performance, transfer skills;improve performance, fine motor coordination skills;improve performance, gross motor coordination skills  -AB      Recorded by [AB] Suha Marinelli OT      Row Name 03/05/20 1531             Lower Extremity Standing Therapeutic Exercise    Performed, Standing Lower Extremity (Therapeutic Exercise)  hip abduction/adduction;hip/knee flexion/extension;shallow squats;heel raises  -AG      Device, Standing Lower Extremity (Therapeutic Exercise)  parallel bars;other (see comments) RW  -AG      Exercise Type, Standing Lower Extremity (Therapeutic Exercise)  AROM (active range of motion)  -AG      Expected Outcomes, Standing Lower Extremity (Therapeutic Exercise)  improve functional tolerance, community activity;improve functional tolerance, household activity;improve functional tolerance, self-care activity;improve performance, gait skills;improve performance, transfer skills;strengthen normal movement patterns;strengthen, facilitate independent active range of motion  -AG      Sets/Reps Detail, Standing Lower Extremity (Therapeutic Exercise)  1x 20  -AG      Comment, Standing Lower Extremity (Therapeutic Exercise)  frequent verbal cues for task initiation and completion  -AG      Recorded by [AG] Ellen Esparza, PT      Row Name 03/05/20 1531             Lower  Extremity Seated Therapeutic Exercise    Performed, Seated Lower Extremity (Therapeutic Exercise)  hip flexion/extension;hip abduction/adduction;knee flexion/extension;ankle dorsiflexion/plantarflexion;LAQ (long arc quad), knee extension  -AG      Device, Seated Lower Extremity (Therapeutic Exercise)  free weights, cuff;small ball  -AG      Exercise Type, Seated Lower Extremity (Therapeutic Exercise)  AROM (active range of motion)  -AG      Expected Outcomes, Seated Lower Extremity (Therapeutic Exercise)  improve functional tolerance, community activity;improve functional tolerance, household activity;improve functional tolerance, self-care activity;improve performance, gait skills;improve performance, transfer skills;strengthen normal movement patterns;strengthen, facilitate independent active range of motion  -AG      Sets/Reps Detail, Seated Lower Extremity (Therapeutic Exercise)  2 x 20  -AG      Recorded by [AG] Ellen Esparza, PT      Row Name 03/05/20 1648 03/05/20 1531          Positioning and Restraints    Pre-Treatment Position  --  in bed  -AG     Post Treatment Position  bed  -AB  bed  -AG     In Bed  call light within reach;encouraged to call for assist;exit alarm on;side rails up x2;side lying right  -AB  notified nsg;supine;call light within reach;encouraged to call for assist;side rails up x3 following PM session  -AG     Recorded by [AB] Suha Marinelli, OT [AG] Ellen Esparza, PT     Row Name 03/05/20 1531             Daily Summary of Progress (PT)    Functional Goal Overall Progress: Physical Therapy  progressing toward functional goals as expected  -AG      Impairments Continuing to Limit Function: Physical Therapy  strength decreased;impaired balance;impaired communication;impaired functional activity tolerance  -AG      Recorded by [AG] Ellen Esparza, ALAN        User Key  (r) = Recorded By, (t) = Taken By, (c) = Cosigned By    Initials Name Effective Dates    Suha Vincent,  OT 04/03/18 -     Ellen Freedman, PT 04/03/18 -                  OT Recommendation and Plan                 OT IRF GOALS     Row Name 03/05/20 1700 03/05/20 1659 02/27/20 1523       Bathing Goal 1 (OT-IRF)    [place] Level (Bathing Goal 1, OT-IRF)  --  --  moderate assist (50-74% patient effort)  -    Time Frame (Bathing Goal 1, OT-IRF)  --  --  long term goal (LTG);by discharge  -       LB Dressing Goal 1 (OT-IRF)    [place] (LB Dressing Goal 1, OT-IRF)  --  --  moderate assist (50-74% patient effort)  -    Time Frame (LB Dressing Goal 1, OT-IRF)  --  --  short term goal (STG)  -    Progress/Outcomes (LB Dressing Goal 1, OT-IRF)  --  goal ongoing  -AB  --       LB Dressing Goal 2 (OT-IRF)    [place] (LB Dressing Goal 2, OT-IRF)  --  --  minimum assist (75% or more patient effort)  -    Time Frame (LB Dressing Goal 2, OT-IRF)  --  --  long term goal (LTG);by discharge  -       Toileting Goal 1 (OT-IRF)    [place] Level (Toileting Goal 1, OT-IRF)  minimum assist (75% or more patient effort);contact guard assist  -AB  --  maximum assist (25-49% patient effort)  -    Time Frame (Toileting Goal 1, OT-IRF)  short term goal (STG);5 - 7 days  -AB  --  short term goal (STG)  -CJ    Progress/Outcomes (Toileting Goal 1, OT-IRF)  --  goal met  -AB  --       Toileting Goal 2 (OT-IRF)    [place] Level (Toileting Goal 2, OT-IRF)  --  --  moderate assist (50-74% patient effort)  -    Time Frame (Toileting Goal 2, OT-IRF)  --  --  long term goal (LTG);by discharge  -      User Key  (r) = Recorded By, (t) = Taken By, (c) = Cosigned By    Initials Name Provider Type    Suha Vincent, OT Occupational Therapist    Daylin Beckman, OT Occupational Therapist                     Time Calculation:     Time Calculation- OT     Row Name 03/05/20 2940             Time Calculation- OT    OT Start Time  1240  -AB      OT Stop Time  1305  -AB      OT Time Calculation (min)  25 min   -AB      Total Timed Code Minutes- OT  25 minute(s)  -AB      OT Non-Billable Time (min)  15 min  -AB        User Key  (r) = Recorded By, (t) = Taken By, (c) = Cosigned By    Initials Name Provider Type    AB Suha Marinelli OT Occupational Therapist          Therapy Charges for Today     Code Description Service Date Service Provider Modifiers Qty    73731210118 HC OT NEUROMUSC RE EDUCATION EA 15 MIN 3/4/2020 Suha Marinelli OT GO 2    76276503451 HC OT SELF CARE/MGMT/TRAIN EA 15 MIN 3/4/2020 Suha Marinelli, OT GO 2    16843347307 HC OT THER PROC EA 15 MIN 3/4/2020 Suha Marinelli OT GO 1    13323832159 HC OT NEUROMUSC RE EDUCATION EA 15 MIN 3/5/2020 Suha Marinelli OT GO 1    85701974410 HC OT SELF CARE/MGMT/TRAIN EA 15 MIN 3/5/2020 Suha Marinelli OT GO 1                   Suha Marinelli OT  3/5/2020    Electronically signed by Suha Marinelli OT at 20 1703          Speech Language Pathology Notes (most recent note)      Alexus Yanes, MS CCC-SLP at 20 1449          Inpatient Rehabilitation - Speech Language Pathology Treatment Note  [facility]     Patient Name: Connie Arango  : 1940  MRN: [id number]  Today's Date: 3/5/2020       Admit Date: 2020  Visit Dx:    No diagnosis found.  Patient Active Problem List   Diagnosis   • ICH (intracerebral hemorrhage) (CMS/[facility])        Therapy Treatment     Ms. Arango is seen this am in speech therapy office for formal s/l therapy to address deficits. She is positioned upright and centered in wheelchair w/ gait belt attached. She is a/a cooperative to participate. She is evidenced w/ improvement in table top tasks this am.    Long Term Goal:  1.Pt will improve receptive language skills to allow for maximal communication and safety in adls.   2.Pt will improve expressive language skills to allow for maximal communication and safety in adls.      Short Term Goals:  1. Pt will  "receptively identify common tangible objects from field of 2 w/ 50% acc and mod cues over 3 consecutive sessions.   *pt is able to identify \"sock, ball, mug\" from FO2 given max cues w/ 60% acc. Pt unable to receptively identify any other common tangible objects. Pt is able to match letters A-Z w/ 95% acc given mod cues.  2. Pt will receptively demonstrate object fnx w/ 60% acc and mod cues over 3 consecutive sessions.   *pt able to receptively demonstrate object fnx of \"marker, spoon, cup, and mug\" w/ 60% acc given mod-max cues to initiate fnx of objects. Significant perseveration during this task.  3. Pt will id body parts w/ 40% acc and mod cues over 3 consecutive sessions.   *not directly addressed this session.  4. Pt will follow simple one step directives 3/5 opp w/ mod-max cues over 3 consecutive sessions.   *pt able to follow simple one step directives related to table top tasks w/ 60% acc given mod-max cues. Pt often requires multiple re-directions during tasks to sustain attention.  5. Pt will verbally produce biographical information 3/5 opp w/ mod-max cues over 3 consecutive session.   *not directly addressed this session.  6. Pt will verbally respond to simple y/n questions w/ 75% acc and mod cues over 3 consecutive sessions.   *pt verbally responds to simple y/n questions w/ 65% acc related to adls, table top tasks. Pt often perseverates during this task.   7. Pt will confrontation name common tangible objects 2/5 opp w/ mod-max cues over 3 consecutive sessions.   *pt able to name \"brush, cup, spoon, ball, sock, mug, knife\" after max cues and models from SLP.  8. Pt will perform rote speech tasks 3/5 opp w/ mod cues over 3 consecutive sessions.   *pt able to name letters A-Z given mod-max cues w/ visual stimuli on table top. Perseveration noted w/ letters \"w, x\"; pt able to verbalize DEWEY and RASHAAD after visual table top cues and max modeling from SLP. Improvement in spontaneous speech this session as well " as ability to participate in tasks.    *Additional goals to be added/modified as necessary.      Thank you-  Alexus Yanes M.S., CCC-SLP      EDUCATION  The patient has been educated in the following areas:   Cognitive Impairment Communication Impairment.    SLP Recommendation and Plan    Continue tx per poc.  Improvement in spontaneous speech this session as well as ability to participate in tasks.    Time Calculation:     Time Calculation- SLP     Time Calculation- SLP     Row Name 03/05/20 1449    SLP Start Time  1045  -Recorded by: AMARIS    SLP Stop Time  1135  -Recorded by: AMARIS ROCK Time Calculation (min)  50 min  -Recorded by: AMARIS    SLP - Next Appointment  03/06/20  -Recorded by: AMARIS            User Key     Initials Name Provider Type    Alexus Lamar MS CCC-SLP Speech and Language Pathologist          Therapy Charges for Today     Code Description Service Date Service Provider Modifiers Qty    62481847939 HC ST TREATMENT SPEECH 3 3/4/2020 Alexus Yanes MS CCC-SLP GN 1    [id number] HC ST TREATMENT SPEECH 3 3/5/2020 Alexus Yanes MS CCC-SLP GN 1                     Alexus Yanes MS CCC-SLP  3/5/2020      Electronically signed by Alexus Yanes MS CCC-SLP at 03/05/20 1453       Respiratory Therapy Notes (most recent note)    No notes exist for this encounter.

## 2020-03-06 NOTE — THERAPY TREATMENT NOTE
"Inpatient Rehabilitation - Speech Language Pathology Treatment Note  [facility]     Patient Name: Connie Arango  : 1940  MRN: [id number]  Today's Date: 3/6/2020       Admit Date: 2020  Visit Dx:    No diagnosis found.  Patient Active Problem List   Diagnosis   • ICH (intracerebral hemorrhage) (CMS/[facility])        Therapy Treatment     Ms. Arango is seen this am in speech therapy office for formal s/l therapy to address deficits. She is positioned upright and centered in wheelchair w/ gait belt attached. She is a/a cooperative to participate. She is evidenced w/ improvement in table top tasks this am.    Long Term Goal:  1.Pt will improve receptive language skills to allow for maximal communication and safety in adls.   2.Pt will improve expressive language skills to allow for maximal communication and safety in adls.      Short Term Goals:  1. Pt will receptively identify common tangible objects from field of 2 w/ 50% acc and mod cues over 3 consecutive sessions.   *pt is able to identify \"sock, ball, mug, spoon\" from FO2 given max cues w/ 60% acc. Pt unable to receptively identify any other common tangible objects. Pt is able to match letters A-Z w/ 95% acc given mod cues.  2. Pt will receptively demonstrate object fnx w/ 60% acc and mod cues over 3 consecutive sessions.   *pt able to receptively demonstrate object fnx of \"marker, spoon, cup, and mug\" w/ 65% acc given mod-max cues to initiate fnx of objects. Significant perseveration during this task.  3. Pt will id body parts w/ 40% acc and mod cues over 3 consecutive sessions.   *not directly addressed this session.  4. Pt will follow simple one step directives 3/5 opp w/ mod-max cues over 3 consecutive sessions.   *pt able to follow simple one step directives related to table top tasks w/ 75% acc given mod-max cues. Pt often requires multiple re-directions during tasks to sustain attention.  5. Pt will verbally produce biographical information 3/5 opp " "w/ mod-max cues over 3 consecutive session.   *pt verbally produces first and last name given mod cues w/ written name on table top. She is also able to verbalize her son's name and her  given mod-max cues.  6. Pt will verbally respond to simple y/n questions w/ 75% acc and mod cues over 3 consecutive sessions.   *pt verbally responds to simple y/n questions w/ 65% acc related to adls, table top tasks. Pt often perseverates during this task.   7. Pt will confrontation name common tangible objects 2/5 opp w/ mod-max cues over 3 consecutive sessions.   *pt able to name \"brush, cup, spoon, ball, sock, mug, knife, etc\" after mod-max cues and models from SLP.  8. Pt will perform rote speech tasks 3/5 opp w/ mod cues over 3 consecutive sessions.   *pt able to name letters A-Z given mod-max cues w/ visual stimuli on table top. Perseveration noted w/ letters \"w, x\"; pt able to verbalize DEWEY and RASHAAD after visual table top cues and max modeling from SLP. Improvement in spontaneous speech this session as well as ability to participate in tasks.    *Additional goals to be added/modified as necessary.      Thank you-  Alexus Yanes M.S., CCC-SLP      EDUCATION  The patient has been educated in the following areas:   Cognitive Impairment Communication Impairment.    SLP Recommendation and Plan    Continue tx per poc.  Improvement in spontaneous speech this session as well as ability to participate in tasks.    Time Calculation:     Time Calculation- SLP     Time Calculation- SLP     Row Name 20 1526    SLP Start Time  0915  -Recorded by: AMARIS    SLP Stop Time  1000  -Recorded by: AMARIS    SLP Time Calculation (min)  45 min  -Recorded by: AMARIS    SLP - Next Appointment  20  -Recorded by: AMARIS            User Key     Initials Name Provider Type    Alexus Lamar MS CCC-SLP Speech and Language Pathologist          Therapy Charges for Today     Code Description Service Date Service Provider Modifiers Qty    " [id number]  ST TREATMENT SPEECH 3 3/5/2020 Alexus Yanes, MS CCC-SLP GN 1    [id number] HC ST TREATMENT SPEECH 3 3/6/2020 Alexus Yanes, MS CCC-SLP GN 1                     Alexus Yanes, MS SEVERO-SLP  3/6/2020

## 2020-03-06 NOTE — THERAPY TREATMENT NOTE
Inpatient Rehabilitation - Occupational Therapy Treatment Note     Dominic     Patient Name: Connie Arango  : 1940  MRN: [id number]    Today's Date: 3/6/2020                 Admit Date: 2020      Visit Dx:  No diagnosis found.    Patient Active Problem List   Diagnosis   • ICH (intracerebral hemorrhage) (CMS/HCC)         Therapy Treatment    IRF Treatment Summary     Row Name 20 1433             Evaluation/Treatment Time and Intent    Subjective Information  no complaints agreeable to therapy  -CJ      Existing Precautions/Restrictions  fall;seizures  -CJ      Document Type  therapy note (daily note)  -CJ      Mode of Treatment  occupational therapy  -CJ      Recorded by [CJ] Daylin Rebolledo, OT      Row Name 20 1433             Cognition/Psychosocial- PT/OT    Follows Commands (Cognition)  physical/tactile prompts required;repetition of directions required;verbal cues/prompting required;increased processing time needed  -CJ      Cognitive Function (Cognitive)  safety deficit  -CJ      Attention Deficit (Cognitive)  requires cues/redirection to task  -CJ      Safety Deficit (Cognitive)  safety precautions awareness;awareness of need for assistance;impulsivity  -CJ      Recorded by [CJ] Daylin Rebolledo, OT      Row Name 20 1433             Communication Assessment/Intervention    Additional Documentation  --  (Pended)  aphasia  -CJ      Recorded by [CJ] Daylin Rebolledo, OT      Row Name 20 1433             Bed-Chair Transfer    Bed-Chair Winnie (Transfers)  contact guard;verbal cues  -CJ      Assistive Device (Bed-Chair Transfers)  wheelchair  -CJ      Recorded by [CJ] Daylin Rebolledo OT      Row Name 20 1433             Toilet Transfer    [place]innie Level (Toilet Transfer)  contact guard;verbal cues  -CJ      Assistive Device (Toilet Transfer)  grab bars/safety frame  -CJ      Recorded by [CJ] Daylin Rebolledo, OT      Row Name 20 1433              Bathing Assessment/Treatment    Bathing [place] Level  minimum assist (75% patient effort);verbal cues;nonverbal cues (demo/gesture)  -CJ      Recorded by [CJ] Daylin Rebolledo, OT      Row Name 03/06/20 1433             Upper Body Dressing Assessment/Treatment    Upper Body Dressing Task  supervision;set up assistance;verbal cues  -CJ      Recorded by [] Daylin Rebolledo, OT      Row Name 03/06/20 1433             Lower Body Dressing Assessment/Treatment    Lower Body Dressing [place] Level  minimum assist (75% patient effort);verbal cues;nonverbal cues (demo/gesture)  -CJ      Recorded by [CJ] Daylin Rebolledo, OT      Row Name 03/06/20 1433             Grooming Assessment/Treatment    Grooming [place] Level  set up;supervision;verbal cues  -CJ      Recorded by [] Daylin Rebolledo, OT      Row Name 03/06/20 1433             Toileting Assessment/Treatment    Toileting [place] Level  minimum assist (75% patient effort);verbal cues  -CJ      Assistive Device Use (Toileting)  grab bar/safety frame  -CJ      Recorded by [CJ] Daylin Rebolledo, OT      Row Name 03/06/20 1433             Upper Extremity Seated Therapeutic Exercise    Exercise Type, Seated Upper Extremity (Therapeutic Exercise)  AROM (active range of motion) BUE ther ex/act, bilat coord ex, GMC, FMC -      Expected Outcomes, Seated Upper Extremity (Therapeutic Exercise)  improve functional tolerance, self-care activity;improve performance, BADLs;improve performance, transfer skills;improve performance, gross motor coordination skills;improve performance, fine motor coordination skills  -CJ      Recorded by [CJ] Daylin Rebolledo, OT      Row Name 03/06/20 1433             Positioning and Restraints    Post Treatment Position  wheelchair  -CJ      In Bed  with SLP;with PT with SLP- first tx;  PT-second tx  -CJ      Recorded by [CJ] Daylin Rebolledo OT        User Key  (r) = Recorded By, (t) = Taken By, (c) = Cosigned By    Initials  Name Effective Dates    CJ Daylin Rebolledo, OT 04/03/18 -                  OT Recommendation and Plan    Anticipated Equipment Needs At Discharge (OT Eval): (TBD)  Planned Therapy Interventions (OT Eval): activity tolerance training, BADL retraining, neuromuscular control/coordination retraining, occupation/activity based interventions, patient/caregiver education/training, ROM/therapeutic exercise, strengthening exercise, transfer/mobility retraining            OT IRF GOALS     Row Name 03/05/20 1700 03/05/20 1659 02/27/20 1523       Bathing Goal 1 (OT-IRF)    [place] Level (Bathing Goal 1, OT-IRF)  --  --  moderate assist (50-74% patient effort)  -    Time Frame (Bathing Goal 1, OT-IRF)  --  --  long term goal (LTG);by discharge  -       LB Dressing Goal 1 (OT-IRF)    [place] (LB Dressing Goal 1, OT-IRF)  --  --  moderate assist (50-74% patient effort)  -    Time Frame (LB Dressing Goal 1, OT-IRF)  --  --  short term goal (STG)  -    Progress/Outcomes (LB Dressing Goal 1, OT-IRF)  --  goal ongoing  -AB  --       LB Dressing Goal 2 (OT-IRF)    [place] (LB Dressing Goal 2, OT-IRF)  --  --  minimum assist (75% or more patient effort)  -    Time Frame (LB Dressing Goal 2, OT-IRF)  --  --  long term goal (LTG);by discharge  -       Toileting Goal 1 (OT-IRF)    [place] Level (Toileting Goal 1, OT-IRF)  minimum assist (75% or more patient effort);contact guard assist  -AB  --  maximum assist (25-49% patient effort)  -    Time Frame (Toileting Goal 1, OT-IRF)  short term goal (STG);5 - 7 days  -AB  --  short term goal (STG)  -CJ    Progress/Outcomes (Toileting Goal 1, OT-IRF)  --  goal met  -AB  --       Toileting Goal 2 (OT-IRF)    [place] Level (Toileting Goal 2, OT-IRF)  --  --  moderate assist (50-74% patient effort)  -    Time Frame (Toileting Goal 2, OT-IRF)  --  --  long term goal (LTG);by discharge  -      User Key  (r) = Recorded By, (t) = Taken By, (c) = Cosigned By     Initials Name Provider Type    Suha Vincent, OT Occupational Therapist     Daylin Rebloledo OT Occupational Therapist                     Time Calculation:     Time Calculation- OT     Row Name 03/06/20 1439 03/06/20 0800          Time Calculation- OT    OT Start Time  1025  -  0800  -     OT Stop Time  1035  -  0915  -     OT Time Calculation (min)  10 min  -  75 min  -     Total Timed Code Minutes- OT  10 minute(s)  -  75 minute(s)  -     OT Non-Billable Time (min)  --  15 min  -       User Key  (r) = Recorded By, (t) = Taken By, (c) = Cosigned By    Initials Name Provider Type     Daylin Rebolledo OT Occupational Therapist          Therapy Charges for Today     Code Description Service Date Service Provider Modifiers Qty    62262584762 HC OT SELF CARE/MGMT/TRAIN EA 15 MIN 3/6/2020 Daylin Rebolledo OT GO 3    19900351544 HC OT THERAPEUTIC ACT EA 15 MIN 3/6/2020 Daylin Rebolledo OT GO 3                   Daylin Rebolledo OT  3/6/2020

## 2020-03-06 NOTE — SIGNIFICANT NOTE
03/06/20 1416   Plan   Plan Referral sent to [facility] via [employer] via in-basket.

## 2020-03-06 NOTE — PROGRESS NOTES
Rehabilitation Nursing  Inpatient Rehabilitation Plan of Care Note    Plan of Care  Pain    Pain Management (Active)  Current Status (2/26/2020 4:32:00 PM): Potential for pain  Weekly Goal: No pain this week  Discharge Goal: no pain    Safety    Potential for Injury (Active)  Current Status (2/26/2020 4:32:00 PM): At risk for injury  Weekly Goal: No injury this week  Discharge Goal: No injuryC    Signed by: Linsey Doan Nurse

## 2020-03-06 NOTE — PLAN OF CARE
Pt actively participated in formal s/l/cog tx this am. Pt continues to make progress towards goals, able to verbalize increased functional communication to other therapists in rehab unit. Continue tx per poc.  Problem: Patient Care Overview  Goal: Plan of Care Review  Outcome: Ongoing (interventions implemented as appropriate)     Problem: Communication Impairment (IRF) (Adult)  Goal: Optimal/Safe Level of Communication [place]  Flowsheets (Taken 2/29/2020 1409 by Patience Yanes MA,CCC-SLP)  Optimal/Safe Level of Communication [place]: demonstrating adequate progress  Goal: Optimal Quality of Life in Relation to Communication  Flowsheets (Taken 2/29/2020 1409 by Patience Yanes MA,CCC-SLP)  Optimal Quality of Life in Relation to Communication: demonstrating adequate progress

## 2020-03-06 NOTE — SIGNIFICANT NOTE
03/06/20 1603   Plan   Plan Spoke to Tracy at [facility] [phone] who is still reviewing pt for admission.  NH to follow-up with SS on Monday 3-9-20.

## 2020-03-07 RX ADMIN — BISACODYL 5 MG: 5 TABLET, COATED ORAL at 06:05

## 2020-03-07 RX ADMIN — CARVEDILOL 12.5 MG: 25 TABLET, FILM COATED ORAL at 08:24

## 2020-03-07 RX ADMIN — ASPIRIN 81 MG: 81 TABLET, CHEWABLE ORAL at 08:25

## 2020-03-07 RX ADMIN — POTASSIUM CHLORIDE 10 MEQ: 750 TABLET, FILM COATED, EXTENDED RELEASE ORAL at 08:24

## 2020-03-07 RX ADMIN — IMIPRAMINE HYDROCHLORIDE 10 MG: 10 TABLET ORAL at 21:00

## 2020-03-07 RX ADMIN — FAMOTIDINE 40 MG: 20 TABLET, FILM COATED ORAL at 08:24

## 2020-03-07 RX ADMIN — ATORVASTATIN CALCIUM 40 MG: 40 TABLET, FILM COATED ORAL at 21:00

## 2020-03-07 RX ADMIN — MAGNESIUM GLUCONATE 500 MG ORAL TABLET 400 MG: 500 TABLET ORAL at 08:24

## 2020-03-07 RX ADMIN — CARVEDILOL 12.5 MG: 25 TABLET, FILM COATED ORAL at 17:10

## 2020-03-07 RX ADMIN — FUROSEMIDE 20 MG: 20 TABLET ORAL at 08:24

## 2020-03-08 RX ADMIN — ATORVASTATIN CALCIUM 40 MG: 40 TABLET, FILM COATED ORAL at 21:31

## 2020-03-08 RX ADMIN — IMIPRAMINE HYDROCHLORIDE 10 MG: 10 TABLET ORAL at 21:31

## 2020-03-08 RX ADMIN — FUROSEMIDE 20 MG: 20 TABLET ORAL at 09:35

## 2020-03-08 RX ADMIN — CARVEDILOL 12.5 MG: 25 TABLET, FILM COATED ORAL at 17:51

## 2020-03-08 RX ADMIN — MAGNESIUM GLUCONATE 500 MG ORAL TABLET 400 MG: 500 TABLET ORAL at 12:23

## 2020-03-08 RX ADMIN — ASPIRIN 81 MG: 81 TABLET, CHEWABLE ORAL at 09:34

## 2020-03-08 RX ADMIN — FAMOTIDINE 40 MG: 20 TABLET, FILM COATED ORAL at 09:34

## 2020-03-08 RX ADMIN — POTASSIUM CHLORIDE 10 MEQ: 750 TABLET, FILM COATED, EXTENDED RELEASE ORAL at 09:34

## 2020-03-08 RX ADMIN — CARVEDILOL 12.5 MG: 25 TABLET, FILM COATED ORAL at 09:34

## 2020-03-08 NOTE — PROGRESS NOTES
PROGRESS NOTE         Patient Identification:  Name:  Connie Arango  Age:  79 y.o.  Sex:  female  :  1940  MRN:  [id number]  Visit Number:  [id number]  Primary Care Provider:  Luis Enrique Prieto MD         LOS: 12 days       ----------------------------------------------------------------------------------------------------------------------  Subjective       Chief Complaints:    Intracranial hemorrhage   Encephalopathy   debility      Interval History:      Vitals are stable with no fever overnight patient seems to be very comfortable and pleasant without any complaints.  Denies any chest pain shortness of breath nausea vomiting or diarrhea.  She is participating with therapy without any difficulty.    Patient participated with speech therapy, occupational therapy, physical therapy on 3/6/20. Patient mobility is at full weight-bearing on both sides. Bed mobility is at contact guard with verbal and nonverbal cues using bed rails. Patient has decreased use of arms for pushing/pulling and decreased use of legs for bridging/pushing. Bed-chair transfer is at contact guard with verbal and nonverbal cues using a wheelchair. Sit-stand transfer is at contact guard with verbal and nonverbal cues using a front-wheeled walker. Toilet transfer training is at contact guard with verbal cues using grab bars and safety frame. Patient ambulated 160 feet and 320 feet is at stand by assist/contact guard assist with verbal and nonverbal cues using a front-wheeled walker.       Review of Systems:    Constitutional: no fever, chills and night sweats.  Eyes: no eye drainage, itching or redness.  HEENT: no mouth sores, dysphagia or nose bleed.  Respiratory: no for shortness of breath, cough or production of sputum.  Cardiovascular: no chest pain, no palpitations, no orthopnea.  Gastrointestinal: no nausea, vomiting or diarrhea. No abdominal pain, hematemesis or rectal bleeding.  Genitourinary: no dysuria or  polyuria.  Hematologic/lymphatic: no lymph node abnormalities, no easy bruising or easy bleeding.  Musculoskeletal: no muscle or joint pain.  Skin: No rash and no itching.  Neurological: no loss of consciousness, no seizure, no headache.  Behavioral/Psych: no depression or suicidal ideation.  Endocrine: no hot flashes.  Immunologic: negative.  ----------------------------------------------------------------------------------------------------------------------      Objective       [facility] Meds:    aspirin 81 mg Oral Daily   atorvastatin 40 mg Oral Nightly   carvedilol 12.5 mg Oral BID With Meals   famotidine 40 mg Oral Daily   furosemide 20 mg Oral Daily   imipramine 10 mg Oral Nightly   magnesium oxide 400 mg Oral Daily   potassium chloride 10 mEq Oral Daily        ----------------------------------------------------------------------------------------------------------------------    Vital Signs:  Temp:  [98.1 °F (36.7 °C)-98.5 °F (36.9 °C)] 98.1 °F (36.7 °C)  Heart Rate:  [61-85] 67  Resp:  [16-20] 16  BP: (103-127)/(50-69) 118/57  No data found.  SpO2 Percentage    03/08/20 0933 03/08/20 1906 03/09/20 0705   SpO2: 99% 96% 94%     SpO2:  [94 %-99 %] 94 %  on   ;   Device (Oxygen Therapy): room air    Body mass index is 26.45 kg/m².  Wt Readings from Last 3 Encounters:   02/26/20 72 kg (158 lb 11.7 oz)        Intake/Output Summary (Last 24 hours) at 3/9/2020 0917  Last data filed at 3/9/2020 0503  Gross per 24 hour   Intake 960 ml   Output --   Net 960 ml     Diet Pureed; Thin  ----------------------------------------------------------------------------------------------------------------------      Physical Exam:     General Appearance: alert and pleasant.  Up in a chair.    Head: normocephalic, without obvious abnormality and atraumatic     Eyes: lids and lashes normal, conjunctivae and sclerae normal, no icterus, no pallor, corneas clear and PERRLA     Ears: ears appear intact with no abnormalities  noted     Nose: nares normal, septum midline, mucosa normal and no drainage                Throat: no oral lesions, no thrush, oral mucosa moist and oopharynx normal     Neck: no adenopathy, supple, trachea midline, no thyromegaly, no carotid bruit and no JVD     Back: no kyphosis present, no scoliosis present, no skin lesions, erythema, or scars, no tenderness to percussion or palpation and range of motion normal     Lungs: clear to auscultation, respirations regular, respirations even and  respirations unlabored. No accessory muscle use.      Heart:: regular rhythm & normal rate, normal S1, S2, no murmur, no gallop, no rub and no click.  Chest wall with no abnormalities observed. PMI nondisplaced     Abdomen: normal bowel sounds in all quadrants, no masses, no hepatomegaly, no splenomegaly, soft non-tender, no guarding and no rebound tenderness     Extremities: no edema, no cyanosis, no redness, no tenderness, no clubbing     Musculoskeletal: joints with no effusion nor erythema nor warmth.  Pedal pulses palpable and equal bilaterally     Skin: no bleeding, bruising or rash and no lesions noted     Lymph Nodes: no palpable adenopathy     Neurologic: Alert and awake.  Mild aphasia.              Sensory intact in BLE and BUE.              Motor strength Generalized weakness  ----------------------------------------------------------------------------------------------------------------------            Results from last 7 days   Lab Units 03/06/20  0152   WBC 10*3/mm3 4.80   HEMOGLOBIN g/dL 11.2*   HEMATOCRIT % 35.6   MCV fL 89.0   MCHC g/dL 31.5   PLATELETS 10*3/mm3 193     Results from last 7 days   Lab Units 03/06/20  0152   SODIUM mmol/L 142   POTASSIUM mmol/L 3.9   MAGNESIUM mg/dL 2.3   CHLORIDE mmol/L 106   CO2 mmol/L 23.7   BUN mg/dL 11   CREATININE mg/dL 0.74   EGFR IF NONAFRICN AM mL/min/1.73 76   CALCIUM mg/dL 9.2   GLUCOSE mg/dL 101*   ALBUMIN g/dL 3.16*   BILIRUBIN mg/dL 0.4   ALK PHOS U/L 73   AST  (SGOT) U/L 34*   ALT (SGPT) U/L 26   Estimated Creatinine Clearance: 55.7 mL/min (by C-G formula based on SCr of 0.74 mg/dL).  No results found for: AMMONIA    No results found for: HGBA1C, POCGLU  No results found for: HGBA1C  No results found for: TSH, FREET4    No results found for: BLOODCX  No results found for: URINECX  No results found for: WOUNDCX  No results found for: STOOLCX  No results found for: RESPCX  Pain Management Panel     There is no flowsheet data to display.            ----------------------------------------------------------------------------------------------------------------------  Imaging Results (Last 24 Hours)     ** No results found for the last 24 hours. **          ----------------------------------------------------------------------------------------------------------------------    Assessment/Plan       Assessment/Plan     ASSESSMENT:    Multifocal CVA/left parietotemporal ICH   Small acute corical ischemic infarcts in the left parietal Lobe and right parieto-occipital region  Left parietotemporal hemorrhage  Encephalopathy  Acute hypoxia respiratory failure  HTN  Hypotension  Small bilateral pleural effusions  Dysphagia  Global aphasia     PLAN:    Vitals are stable with no fever overnight patient seems to be very comfortable and pleasant without any complaints.  Denies any chest pain shortness of breath nausea vomiting or diarrhea.  She is participating with therapy without any difficulty.    Patient participated with speech therapy, occupational therapy, physical therapy on 3/6/20. Patient mobility is at full weight-bearing on both sides. Bed mobility is at contact guard with verbal and nonverbal cues using bed rails. Patient has decreased use of arms for pushing/pulling and decreased use of legs for bridging/pushing. Bed-chair transfer is at contact guard with verbal and nonverbal cues using a wheelchair. Sit-stand transfer is at contact guard with verbal and nonverbal cues  using a front-wheeled walker. Toilet transfer training is at contact guard with verbal cues using grab bars and safety frame. Patient ambulated 160 feet and 320 feet is at stand by assist/contact guard assist with verbal and nonverbal cues using a front-wheeled walker.     Patient participated with occupational therapy, speech therapy, and physical therapy on 3/5/20. Patient's mobility is at full weight-bearing on both sides. Bed-chair transfer is at contact guard with verbal and nonverbal cues and using a wheelchair. Stand-sit transfer is at contact guard with verbal and nonverbal cues using a front-wheeled walker. Stand pivot/stand step transfer is at contact guard with verbal and nonverbal cues using a front-wheeled walker. Patient ambulated 300 feet x2 at contact guard with verbal and nonverbal cues using a front-wheeled walker.        Code Status:   Code Status and Medical Interventions:   Ordered at: 02/26/20 1948     Code Status:    CPR     Medical Interventions (Level of Support Prior to Arrest):    Full         Sean Coppola MD  03/09/20  9:17 AM

## 2020-03-09 PROCEDURE — 97535 SELF CARE MNGMENT TRAINING: CPT

## 2020-03-09 PROCEDURE — 97110 THERAPEUTIC EXERCISES: CPT

## 2020-03-09 PROCEDURE — 92507 TX SP LANG VOICE COMM INDIV: CPT | Performed by: SPEECH-LANGUAGE PATHOLOGIST

## 2020-03-09 PROCEDURE — 97530 THERAPEUTIC ACTIVITIES: CPT

## 2020-03-09 PROCEDURE — 97116 GAIT TRAINING THERAPY: CPT

## 2020-03-09 RX ADMIN — FUROSEMIDE 20 MG: 20 TABLET ORAL at 08:21

## 2020-03-09 RX ADMIN — FAMOTIDINE 40 MG: 20 TABLET, FILM COATED ORAL at 08:20

## 2020-03-09 RX ADMIN — CARVEDILOL 12.5 MG: 25 TABLET, FILM COATED ORAL at 17:25

## 2020-03-09 RX ADMIN — ASPIRIN 81 MG: 81 TABLET, CHEWABLE ORAL at 08:20

## 2020-03-09 RX ADMIN — CARVEDILOL 12.5 MG: 25 TABLET, FILM COATED ORAL at 08:21

## 2020-03-09 RX ADMIN — POTASSIUM CHLORIDE 10 MEQ: 750 TABLET, FILM COATED, EXTENDED RELEASE ORAL at 08:21

## 2020-03-09 RX ADMIN — ATORVASTATIN CALCIUM 40 MG: 40 TABLET, FILM COATED ORAL at 20:19

## 2020-03-09 RX ADMIN — MAGNESIUM GLUCONATE 500 MG ORAL TABLET 400 MG: 500 TABLET ORAL at 11:50

## 2020-03-09 RX ADMIN — IMIPRAMINE HYDROCHLORIDE 10 MG: 10 TABLET ORAL at 20:19

## 2020-03-09 NOTE — SIGNIFICANT NOTE
03/09/20 1050   Plan   Plan Spoke to pt about short-term NH placement for continued PT, OT, SLP and nursing care.    Informed her [facility] was reviewing her for admission and she was agreeable.

## 2020-03-09 NOTE — SIGNIFICANT NOTE
03/09/20 1030   Plan   Plan Received call from Zeina at [facility] who says they are reviewing pt and interested, therefore, will call back again about admission.

## 2020-03-09 NOTE — PROGRESS NOTES
Inpatient Rehabilitation Plan of Care Note    Plan of Care  Mobility    [PT] Bed/Chair/Wheelchair(Active)  Current Status(03/09/2020): CGA  Weekly Goal(03/16/2020): Sup  Discharge Goal: Sup    [PT] Walk(Active)  Current Status(03/09/2020): amb 360' RW SBA  Weekly Goal(03/16/2020): amb 150' RW CGA  Discharge Goal: amb 300' RW Sup    Signed by: Heidi Ingram, Physical Therapist

## 2020-03-09 NOTE — PROGRESS NOTES
Inpatient Rehabilitation Functional Measures Assessment and Plan of Care    Plan of Care      Functional Measures  KRISTEN Eating:  Eating Score = 5. Patient is supervision/set-up for eating,  requiring: No assistive devices were required.  KRISTEN Grooming:  KRISTEN Bathing:  KRISTEN Upper Body Dressing:  KRISTEN Lower Body Dressing:  KRISTEN Toileting:    KRISTEN Bladder Management  Level of Assistance:  Frequency/Number of Accidents this Shift:    KRISTEN Bowel Management  Level of Assistance:  Frequency/Number of Accidents this Shift:    KRISTEN Bed/Chair/Wheelchair Transfer:  KRISTEN Toilet Transfer:  KRISTEN Tub/Shower Transfer:    Previously Documented Mode of Locomotion at Discharge:  KRISTEN Expected Mode of Locomotion at Discharge:  KRISTEN Walk/Wheelchair:  KRISTEN Stairs:    KRISTEN Comprehension:  Auditory comprehension is the usual mode. Patient does not  comprehend complex/abstract information in their primary language without  assistance from a helper. Comprehension Score = 3, Moderate Prompting. Patient  comprehends basic daily needs or ideas 50-74% of the time. Patient requires  moderate/some prompting. No assistive devices were required.  KRISTEN Expression:  Vocal expression is the usual mode. Patient does not express  complex/abstract information in their primary language without a helper.  Expression Score = 3, Moderate Prompting. Patient expresses basic daily needs or  ideas 50-74% of the time. Patient requires moderate/some prompting. No assistive  devices were required.  KRISTEN Social Interaction:  Social Interaction Score = 5, Supervision.  Patient may  require encouragement to initiate participation. Patient requires directing only  under stressful or unfamiliar conditions, but less than 10% of the time, for the  following behavior(s):  KRISTEN Problem Solving:  Patient does not make appropriate decisions in order to  solve complex problems without assistance from a helper. Problem Solving Score =  3, Moderate Direction. Patient makes appropriate decisions in  order to solve  routine problems 50-74% of the time. Patient requires moderate/some direction  for the following behavior(s):  KRISTEN Memory:  Memory Score = 3, Moderate Prompting. Patient recognizes and  remembers 50-74% of the time. Patient requires moderate/some prompting  for  memory for the following:    Therapy Mode Minutes  Occupational Therapy:  Physical Therapy:  Speech Language Pathology: Individual: 45 minutes.    Discharge Functional Goals:    Signed by: Alexus Yanes Speech therapist

## 2020-03-09 NOTE — PROGRESS NOTES
PROGRESS NOTE         Patient Identification:  Name:  Connie Arango  Age:  79 y.o.  Sex:  female  :  1940  MRN:  [id number]  Visit Number:  [id number]  Primary Care Provider:  Luis Enrique Prieto MD         LOS: 13 days       ----------------------------------------------------------------------------------------------------------------------  Subjective       Chief Complaints:    Intracranial hemorrhage   Encephalopathy   debility      Interval History:      Patient participated with speech therapy, physical therapy, and occupational therapy on 3/9/20. Her mobility is at full weight-bearing on both sides. Speech therapy updated the plan of care to refect the following notations: Patient actively participated in formal speech language treatment on this date and she continues to make progress towards her goals.    Problem: Communication Impairment (IRF) (Adult)  Goal: Optimal/Safe Level of Communication [place]  Outcome: Ongoing (interventions implemented as appropriate)  Optimal/Safe Level of Communication [place]: demonstrating adequate progress  Goal: Optimal Quality of Life in Relation to Communication  Outcome: Ongoing (interventions implemented as appropriate)    Vitals are stable with no fever overnight patient seems to be very comfortable and pleasant without any complaints.  Denies any chest pain shortness of breath nausea vomiting or diarrhea.  She is participating with therapy without any difficulty.    Patient participated with speech therapy, occupational therapy, physical therapy on 3/6/20. Patient mobility is at full weight-bearing on both sides. Bed mobility is at contact guard with verbal and nonverbal cues using bed rails. Patient has decreased use of arms for pushing/pulling and decreased use of legs for bridging/pushing. Bed-chair transfer is at contact guard with verbal and nonverbal cues using a wheelchair. Sit-stand transfer is at contact guard with verbal and  nonverbal cues using a front-wheeled walker. Toilet transfer training is at contact guard with verbal cues using grab bars and safety frame. Patient ambulated 160 feet and 320 feet is at stand by assist/contact guard assist with verbal and nonverbal cues using a front-wheeled walker.       Review of Systems:    Constitutional: no fever, chills and night sweats.  Eyes: no eye drainage, itching or redness.  HEENT: no mouth sores, dysphagia or nose bleed.  Respiratory: no for shortness of breath, cough or production of sputum.  Cardiovascular: no chest pain, no palpitations, no orthopnea.  Gastrointestinal: no nausea, vomiting or diarrhea. No abdominal pain, hematemesis or rectal bleeding.  Genitourinary: no dysuria or polyuria.  Hematologic/lymphatic: no lymph node abnormalities, no easy bruising or easy bleeding.  Musculoskeletal: no muscle or joint pain.  Skin: No rash and no itching.  Neurological: no loss of consciousness, no seizure, no headache.  Behavioral/Psych: no depression or suicidal ideation.  Endocrine: no hot flashes.  Immunologic: negative.  ----------------------------------------------------------------------------------------------------------------------      Objective       Current [facility] Meds:    aspirin 81 mg Oral Daily   atorvastatin 40 mg Oral Nightly   carvedilol 12.5 mg Oral BID With Meals   famotidine 40 mg Oral Daily   furosemide 20 mg Oral Daily   imipramine 10 mg Oral Nightly   magnesium oxide 400 mg Oral Daily   potassium chloride 10 mEq Oral Daily        ----------------------------------------------------------------------------------------------------------------------    Vital Signs:  Temp:  [98.1 °F (36.7 °C)-98.5 °F (36.9 °C)] 98.5 °F (36.9 °C)  Heart Rate:  [61-82] 82  Resp:  [16-20] 20  BP: (103-142)/(50-64) 118/56  No data found.  SpO2 Percentage    03/08/20 1906 03/09/20 0705 03/09/20 1905   SpO2: 96% 94% 96%     SpO2:  [94 %-96 %] 96 %  on   ;   Device (Oxygen Therapy):  room air    Body mass index is 26.45 kg/m².  Wt Readings from Last 3 Encounters:   02/26/20 72 kg (158 lb 11.7 oz)        Intake/Output Summary (Last 24 hours) at 3/10/2020 0701  Last data filed at 3/10/2020 0501  Gross per 24 hour   Intake 1440 ml   Output --   Net 1440 ml     Diet Pureed; Thin  ----------------------------------------------------------------------------------------------------------------------      Physical Exam:     General Appearance: alert and pleasant.  Resting.    Head: normocephalic, without obvious abnormality and atraumatic     Eyes: lids and lashes normal, conjunctivae and sclerae normal, no icterus, no pallor, corneas clear and PERRLA     Ears: ears appear intact with no abnormalities noted     Nose: nares normal, septum midline, mucosa normal and no drainage                Throat: no oral lesions, no thrush, oral mucosa moist and oopharynx normal     Neck: no adenopathy, supple, trachea midline, no thyromegaly, no carotid bruit and no JVD     Back: no kyphosis present, no scoliosis present, no skin lesions, erythema, or scars, no tenderness to percussion or palpation and range of motion normal     Lungs: clear to auscultation, respirations regular, respirations even and  respirations unlabored. No accessory muscle use.      Heart:: regular rhythm & normal rate, normal S1, S2, no murmur, no gallop, no rub and no click.  Chest wall with no abnormalities observed. PMI nondisplaced     Abdomen: normal bowel sounds in all quadrants, no masses, no hepatomegaly, no splenomegaly, soft non-tender, no guarding and no rebound tenderness     Extremities: no edema, no cyanosis, no redness, no tenderness, no clubbing     Musculoskeletal: joints with no effusion nor erythema nor warmth.  Pedal pulses palpable and equal bilaterally     Skin: no bleeding, bruising or rash and no lesions noted     Lymph Nodes: no palpable adenopathy     Neurologic: Alert and awake.  Mild aphasia.              Sensory  intact in BLE and BUE.              Motor strength Generalized weakness  ----------------------------------------------------------------------------------------------------------------------            Results from last 7 days   Lab Units 03/06/20  0152   WBC 10*3/mm3 4.80   HEMOGLOBIN g/dL 11.2*   HEMATOCRIT % 35.6   MCV fL 89.0   MCHC g/dL 31.5   PLATELETS 10*3/mm3 193     Results from last 7 days   Lab Units 03/06/20  0152   SODIUM mmol/L 142   POTASSIUM mmol/L 3.9   MAGNESIUM mg/dL 2.3   CHLORIDE mmol/L 106   CO2 mmol/L 23.7   BUN mg/dL 11   CREATININE mg/dL 0.74   EGFR IF NONAFRICN AM mL/min/1.73 76   CALCIUM mg/dL 9.2   GLUCOSE mg/dL 101*   ALBUMIN g/dL 3.16*   BILIRUBIN mg/dL 0.4   ALK PHOS U/L 73   AST (SGOT) U/L 34*   ALT (SGPT) U/L 26   Estimated Creatinine Clearance: 55.7 mL/min (by C-G formula based on SCr of 0.74 mg/dL).  No results found for: AMMONIA    No results found for: HGBA1C, POCGLU  No results found for: HGBA1C  No results found for: TSH, FREET4    No results found for: BLOODCX  No results found for: URINECX  No results found for: WOUNDCX  No results found for: STOOLCX  No results found for: RESPCX  Pain Management Panel     There is no flowsheet data to display.            ----------------------------------------------------------------------------------------------------------------------  Imaging Results (Last 24 Hours)     ** No results found for the last 24 hours. **          ----------------------------------------------------------------------------------------------------------------------    Assessment/Plan       Assessment/Plan     ASSESSMENT:    Multifocal CVA/left parietotemporal ICH   Small acute corical ischemic infarcts in the left parietal Lobe and right parieto-occipital region  Left parietotemporal hemorrhage  Encephalopathy  Acute hypoxia respiratory failure  HTN  Hypotension  Small bilateral pleural effusions  Dysphagia  Global aphasia     PLAN:    Patient participated with  speech therapy, physical therapy, and occupational therapy on 3/9/20. Her mobility is at full weight-bearing on both sides. Speech therapy updated the plan of care to refect the following notations: Patient actively participated in formal speech language treatment on this date and she continues to make progress towards her goals.    Problem: Communication Impairment (IRF) (Adult)  Goal: Optimal/Safe Level of Communication [place]  Outcome: Ongoing (interventions implemented as appropriate)  Optimal/Safe Level of Communication [place]: demonstrating adequate progress  Goal: Optimal Quality of Life in Relation to Communication  Outcome: Ongoing (interventions implemented as appropriate)    Patient participated with speech therapy, occupational therapy, physical therapy on 3/6/20. Patient mobility is at full weight-bearing on both sides. Bed mobility is at contact guard with verbal and nonverbal cues using bed rails. Patient has decreased use of arms for pushing/pulling and decreased use of legs for bridging/pushing. Bed-chair transfer is at contact guard with verbal and nonverbal cues using a wheelchair. Sit-stand transfer is at contact guard with verbal and nonverbal cues using a front-wheeled walker. Toilet transfer training is at contact guard with verbal cues using grab bars and safety frame. Patient ambulated 160 feet and 320 feet is at stand by assist/contact guard assist with verbal and nonverbal cues using a front-wheeled walker.     Patient participated with occupational therapy, speech therapy, and physical therapy on 3/5/20. Patient's mobility is at full weight-bearing on both sides. Bed-chair transfer is at contact guard with verbal and nonverbal cues and using a wheelchair. Stand-sit transfer is at contact guard with verbal and nonverbal cues using a front-wheeled walker. Stand pivot/stand step transfer is at contact guard with verbal and nonverbal cues using a front-wheeled walker. Patient  ambulated 300 feet x2 at contact guard with verbal and nonverbal cues using a front-wheeled walker.        Code Status:   Code Status and Medical Interventions:   Ordered at: 02/26/20 1948     Code Status:    CPR     Medical Interventions (Level of Support Prior to Arrest):    Full         Sean Coppola MD  03/10/20  7:01 AM

## 2020-03-09 NOTE — PROGRESS NOTES
Inpatient Rehabilitation Functional Measures Assessment and Plan of Care    Plan of Care   Self Care    Dressing (Lower) (Active)  Current Status (3/9/2020 10:45:00 AM): Sb  Weekly Goal: CGA  Discharge Goal: CGA    Toileting (Active)  Current Status (3/9/2020 10:45:00 AM): Sb  Weekly Goal: CGA  Discharge Goal: CGA    Functional Measures  KRISTEN Eating:  KRISTEN Grooming:  KRISTEN Bathing:  KRISTEN Upper Body Dressing:  KRISTEN Lower Body Dressing:  KRISTEN Toileting:    KRISTEN Bladder Management  Level of Assistance:  Frequency/Number of Accidents this Shift:    KRISTEN Bowel Management  Level of Assistance:  Frequency/Number of Accidents this Shift:    KRISTEN Bed/Chair/Wheelchair Transfer:  KRISTEN Toilet Transfer:  KRISTEN Tub/Shower Transfer:    Previously Documented Mode of Locomotion at Discharge:  KRISTEN Expected Mode of Locomotion at Discharge:  KRISTEN Walk/Wheelchair:  KRISTEN Stairs:    KRISTEN Comprehension:  KRISTEN Expression:  KRISTEN Social Interaction:  KRISTEN Problem Solving:  KRISTEN Memory:    Therapy Mode Minutes  Occupational Therapy: Individual: 100 minutes.  Physical Therapy:  Speech Language Pathology:    Discharge Functional Goals:    Signed by: Daylin Rebolledo Occupational Therapist

## 2020-03-09 NOTE — THERAPY TREATMENT NOTE
Inpatient Rehabilitation - Occupational Therapy Treatment Note     [place]     Patient Name: Connie Arango  : 1940  MRN: [id number]    Today's Date: 3/9/2020                 Admit Date: 2020      Visit Dx:  No diagnosis found.    Patient Active Problem List   Diagnosis   • ICH (intracerebral hemorrhage) (CMS/HCC)         Therapy Treatment    IRF Treatment Summary     Row Name 20 1505             Evaluation/Treatment Time and Intent    Subjective Information  no complaints agreeable to therapy  -CJ      Existing Precautions/Restrictions  fall;seizures  -CJ      Document Type  therapy note (daily note)  -CJ      Mode of Treatment  occupational therapy  -CJ      Recorded by [CJ] Daylin Rebolledo OT      Row Name 20 1505             Cognition/Psychosocial- PT/OT    Follows Commands (Cognition)  physical/tactile prompts required;repetition of directions required;verbal cues/prompting required  -CJ      Cognitive Function (Cognitive)  safety deficit  -CJ      Attention Deficit (Cognitive)  requires cues/redirection to task  -CJ      Safety Deficit (Cognitive)  safety precautions awareness;safety precautions follow-through/compliance;impulsivity  -CJ      Recorded by [CJ] Daylin Rebolledo, OT      Row Name 20 1505             Communication Assessment/Intervention    Additional Documentation  -- aphasia  -CJ      Recorded by [CJ] Daylin Rebolledo, OT      Row Name 20 1505             Bed-Chair Transfer    Bed-Chair [place] (Transfers)  contact guard;verbal cues  -CJ      Assistive Device (Bed-Chair Transfers)  wheelchair  -CJ      Recorded by [CJ] Daylin Rebolledo OT      Row Name 20 1505             Toilet Transfer    [place] Level (Toilet Transfer)  contact guard;verbal cues  -CJ      Assistive Device (Toilet Transfer)  grab bars/safety frame  -CJ      Recorded by [CJ] Daylin Rebolledo OT      Row Name 20 1505             Grooming Assessment/Treatment     Grooming [place] Level  set up;supervision;verbal cues  -CJ      Recorded by [CJ] Daylin Rebolledo OT      Row Name 03/09/20 1505             Toileting Assessment/Treatment    Toileting [place] Level  minimum assist (75% patient effort);verbal cues  -CJ      Assistive Device Use (Toileting)  grab bar/safety frame  -CJ      Recorded by [CJ] Daylin Rebolledo OT      Row Name 03/09/20 1505             Upper Extremity Seated Therapeutic Exercise    Device, Seated Upper Extremity (Therapeutic Exercise)  -- dowel ex-wrist rolls X 2;  theraputty  -CJ      Exercise Type, Seated Upper Extremity (Therapeutic Exercise)  AROM (active range of motion) BUE ther ex/act, bilat coord ex, GMC/FMC  -CJ      Expected Outcomes, Seated Upper Extremity (Therapeutic Exercise)  improve functional tolerance, self-care activity;improve performance, BADLs;improve performance, transfer skills;improve performance, gross motor coordination skills;improve performance, fine motor coordination skills  -CJ      Recorded by [CJ] Daylin Rebolledo OT      Row Name 03/09/20 1503             Positioning and Restraints    Post Treatment Position  wheelchair  -CJ      In Wheelchair  sitting;call light within reach;encouraged to call for assist;notified nsg;with PT in room- post am tx;  with PT - pm tx  -CJ      Recorded by [CJ] Daylin Rebolledo OT        User Key  (r) = Recorded By, (t) = Taken By, (c) = Cosigned By    Initials Name Effective Dates     Daylin Rebolledo OT 04/03/18 -                  OT Recommendation and Plan    Anticipated Equipment Needs At Discharge (OT Eval): (TBD)  Planned Therapy Interventions (OT Eval): activity tolerance training, BADL retraining, neuromuscular control/coordination retraining, occupation/activity based interventions, patient/caregiver education/training, ROM/therapeutic exercise, strengthening exercise, transfer/mobility retraining            OT IRF GOALS     Row Name 03/05/20 1700 03/05/20 4777  02/27/20 1523       Bathing Goal 1 (OT-IRF)    [place] Level (Bathing Goal 1, OT-IRF)  --  --  moderate assist (50-74% patient effort)  -CJ    Time Frame (Bathing Goal 1, OT-IRF)  --  --  long term goal (LTG);by discharge  -       LB Dressing Goal 1 (OT-IRF)    [place] (LB Dressing Goal 1, OT-IRF)  --  --  moderate assist (50-74% patient effort)  -CJ    Time Frame (LB Dressing Goal 1, OT-IRF)  --  --  short term goal (STG)  -CJ    Progress/Outcomes (LB Dressing Goal 1, OT-IRF)  --  goal ongoing  -AB  --       LB Dressing Goal 2 (OT-IRF)    [place] (LB Dressing Goal 2, OT-IRF)  --  --  minimum assist (75% or more patient effort)  -CJ    Time Frame (LB Dressing Goal 2, OT-IRF)  --  --  long term goal (LTG);by discharge  -       Toileting Goal 1 (OT-IRF)    [place] Level (Toileting Goal 1, OT-IRF)  minimum assist (75% or more patient effort);contact guard assist  -AB  --  maximum assist (25-49% patient effort)  -CJ    Time Frame (Toileting Goal 1, OT-IRF)  short term goal (STG);5 - 7 days  -AB  --  short term goal (STG)  -CJ    Progress/Outcomes (Toileting Goal 1, OT-IRF)  --  goal met  -AB  --       Toileting Goal 2 (OT-IRF)    [place] Level (Toileting Goal 2, OT-IRF)  --  --  moderate assist (50-74% patient effort)  -CJ    Time Frame (Toileting Goal 2, OT-IRF)  --  --  long term goal (LTG);by discharge  -      User Key  (r) = Recorded By, (t) = Taken By, (c) = Cosigned By    Initials Name Provider Type    Suha Vincent, OT Occupational Therapist    Daylin Beckman, OT Occupational Therapist                     Time Calculation:     Time Calculation- OT     Row Name 03/09/20 1518 03/09/20 1055          Time Calculation- OT    OT Start Time  1250  -  1055  -     OT Stop Time  1330  -CJ  1155  -     OT Time Calculation (min)  40 min  -CJ  60 min  -     Total Timed Code Minutes- OT  40 minute(s)  -CJ  60 minute(s)  -     OT Non-Billable Time (min)  --  15 min  -CJ        User Key  (r) = Recorded By, (t) = Taken By, (c) = Cosigned By    Initials Name Provider Type     Daylin Rebolledo, OT Occupational Therapist          Therapy Charges for Today     Code Description Service Date Service Provider Modifiers Qty    10986208745 HC OT SELF CARE/MGMT/TRAIN EA 15 MIN 3/9/2020 Daylin Rebolledo OT GO 2    [id number]  OT THER PROC EA 15 MIN 3/9/2020 Daylin Rebolledo OT GO 2    [id number]  OT THERAPEUTIC ACT EA 15 MIN 3/9/2020 Daylin Rebolledo OT GO 3                   Daylin Rebolledo OT  3/9/2020

## 2020-03-09 NOTE — THERAPY TREATMENT NOTE
"Inpatient Rehabilitation - Speech Language Pathology Treatment Note  [facility]     Patient Name: Connie Arango  : 1940  MRN: [id number]  Today's Date: 3/9/2020       Admit Date: 2020  Visit Dx:    No diagnosis found.  Patient Active Problem List   Diagnosis   • ICH (intracerebral hemorrhage) (CMS/[facility])        Therapy Treatment     Ms. Arango is seen this am in speech therapy office for formal s/l therapy to address deficits. She is positioned upright and centered in wheelchair w/ gait belt attached. She is a/a cooperative to participate. She is evidenced w/ improvement in table top tasks this am.    Long Term Goal:  1.Pt will improve receptive language skills to allow for maximal communication and safety in adls.   2.Pt will improve expressive language skills to allow for maximal communication and safety in adls.      Short Term Goals:  1. Pt will receptively identify common tangible objects from field of 2 w/ 50% acc and mod cues over 3 consecutive sessions.   *pt is able to identify \"sock, ball, mug, spoon, fork, hammer, comb, knife, cup, plate\" from FO2 given min-mod cues w/ 90% acc; significant decrease in accuracy when choosing form FO3. Pt unable to receptively identify any other common tangible objects. Pt is able to match letters A-Z w/ 95% acc given mod cues.  2. Pt will receptively demonstrate object fnx w/ 60% acc and mod cues over 3 consecutive sessions.   *pt able to receptively demonstrate object fnx of \"marker, spoon, cup, and mug\" w/ 75% acc given mod-max cues to initiate fnx of objects. Significant perseveration during this task.  3. Pt will id body parts w/ 40% acc and mod cues over 3 consecutive sessions.   *not directly addressed this session.  4. Pt will follow simple one step directives 3/5 opp w/ mod-max cues over 3 consecutive sessions.   *pt able to follow simple one step directives related to table top tasks w/ 75% acc given mod-max cues. Pt often requires multiple " "re-directions during tasks to sustain attention.  5. Pt will verbally produce biographical information 3/5 opp w/ mod-max cues over 3 consecutive session.   *pt verbally produces first and last name given mod cues w/ written name on table top. She is also able to verbalize her son's name and her  given mod-max cues.  6. Pt will verbally respond to simple y/n questions w/ 75% acc and mod cues over 3 consecutive sessions.   *pt verbally responds to simple y/n questions w/ 65% acc related to adls, table top tasks. Pt often perseverates during this task.   7. Pt will confrontation name common tangible objects 2/5 opp w/ mod-max cues over 3 consecutive sessions.   *pt able to name \"brush, cup, spoon, ball, sock, mug, knife, etc\" after mod-max cues and models from SLP.  8. Pt will perform rote speech tasks 3/5 opp w/ mod cues over 3 consecutive sessions.   *pt able to name letters A-Z given mod-max cues w/ visual stimuli on table top. Perseveration noted w/ letters \"w,y,z,u, x\"; pt able to verbalize DEWEY and RASHAAD after visual table top cues and max modeling from SLP. Improvement in spontaneous speech this session as well as ability to participate in tasks.    *Additional goals to be added/modified as necessary.      Thank you-  KITTY GomesS., CCC-SLP      EDUCATION  The patient has been educated in the following areas:   Cognitive Impairment Communication Impairment.    SLP Recommendation and Plan    Continue tx per poc.  Improvement in spontaneous speech this session as well as ability to participate in tasks.    Time Calculation:     Time Calculation- SLP     Time Calculation- SLP     Row Name 20 1334    SLP Start Time  0830  -Recorded by: AMARIS    SLP Stop Time  915  -Recorded by: AMARIS    SLP Time Calculation (min)  45 min  -Recorded by: AMARIS    SLP - Next Appointment  03/10/20  -Recorded by: AMARIS            User Key     Initials Name Provider Type    Alexus Lamar MS CCC-SLP Speech and Language " Pathologist          Therapy Charges for Today     Code Description Service Date Service Provider Modifiers Qty    18689711760  ST TREATMENT SPEECH 3 3/9/2020 Alexus Yanes, MS CCC-SLP GN 1                     Alexus Yanes MS CCC-SLP  3/9/2020

## 2020-03-09 NOTE — PLAN OF CARE
Pt actively participated in formal s/l/cog tx this am. Continues to make progress towards her goals. Continue tx per poc.   Problem: Patient Care Overview  Goal: Plan of Care Review  Outcome: Ongoing (interventions implemented as appropriate)     Problem: Communication Impairment (IRF) (Adult)  Goal: Optimal/Safe Level of Communication [place]  Outcome: Ongoing (interventions implemented as appropriate)  Flowsheets (Taken 2/29/2020 1409 by Patience Yanes MA,CCC-SLP)  Optimal/Safe Level of Communication [place]: demonstrating adequate progress  Goal: Optimal Quality of Life in Relation to Communication  Outcome: Ongoing (interventions implemented as appropriate)  Flowsheets (Taken 2/29/2020 1409 by Patience Yanes MA,CCC-SLP)  Optimal Quality of Life in Relation to Communication: demonstrating adequate progress

## 2020-03-09 NOTE — THERAPY TREATMENT NOTE
Inpatient Rehabilitation - Physical Therapy Treatment Note   [place]     Patient Name: Connie Arango  : 1940  MRN: [id number]    Today's Date: 3/9/2020                 Admit Date: 2020      Visit Dx:    No diagnosis found.    Patient Active Problem List   Diagnosis   • ICH (intracerebral hemorrhage) (CMS/HCC)       Therapy Treatment    IRF Treatment Summary     Row Name 20 1528 20 1505          Evaluation/Treatment Time and Intent    Subjective Information  no complaints  -RG  no complaints agreeable to therapy  -CJ     Existing Precautions/Restrictions  fall;seizures  -RG  fall;seizures  -CJ     Document Type  therapy note (daily note)  -RG  therapy note (daily note)  -CJ     Mode of Treatment  physical therapy  -RG  occupational therapy  -CJ     Patient/Family Observations  Pt and nursing in agreement for skilled PT on this date.  Pt required verbal and tactile cues for task initiation.   -RG  --     Recorded by [RG] Prakash Haque PTA [CJ] Daylin Rebolledo, OT     Row Name 20 1528 20 1505          Cognition/Psychosocial- PT/OT    Affect/Mental Status (Cognitive)  WFL  -RG  --     Follows Commands (Cognition)  physical/tactile prompts required  -RG  physical/tactile prompts required;repetition of directions required;verbal cues/prompting required  -CJ     Personal Safety Interventions  gait belt;nonskid shoes/slippers when out of bed;supervised activity  -RG  --     Cognitive Function (Cognitive)  safety deficit;unable/difficult to assess  -RG  safety deficit  -CJ     Attention Deficit (Cognitive)  requires cues/redirection to task  -RG  requires cues/redirection to task  -CJ     Safety Deficit (Cognitive)  safety precautions awareness;safety precautions follow-through/compliance;insight into deficits/self awareness  -RG  safety precautions awareness;safety precautions follow-through/compliance;impulsivity  -CJ     Recorded by [RG] Prakash Haque PTA [CJ] Daylin Rebolledo,  OT     Row Name 03/09/20 1505             Communication Assessment/Intervention    Additional Documentation  -- aphasia  -CJ      Recorded by [CJ] Daylin Rebolledo, OT      Row Name 03/09/20 1528             Mobility    Extremity Weight-bearing Status  left lower extremity;right lower extremity  -RG      Left Lower Extremity (Weight-bearing Status)  full weight-bearing (FWB)  -RG      Right Lower Extremity (Weight-bearing Status)  full weight-bearing (FWB)  -RG      Recorded by [RG] Prakash Haque PTA      Row Name 03/09/20 1528             Bed Mobility Assessment/Treatment    Bed Mobility Assessment/Treatment  sit-supine  -RG      Sit-Supine [place] (Bed Mobility)  contact guard;verbal cues;nonverbal cues (demo/gesture)  -RG      Bed Mobility, Safety Issues  decreased use of arms for pushing/pulling;decreased use of legs for bridging/pushing  -RG      Assistive Device (Bed Mobility)  bed rails  -RG      Recorded by [RG] Prakash Haque PTA      Row Name 03/09/20 1528             Transfer Assessment/Treatment    Transfer Assessment/Treatment  sit-stand transfer;stand-sit transfer;stand pivot/stand step transfer  -RG      Recorded by [RG] Prakash Haque PTA      Row Name 03/09/20 1528 03/09/20 1505          Bed-Chair Transfer    Bed-Chair [place] (Transfers)  verbal cues;nonverbal cues (demo/gesture);contact guard  -RG  contact guard;verbal cues  -CJ     Assistive Device (Bed-Chair Transfers)  wheelchair  -RG  wheelchair  -CJ     Recorded by [RG] Prakash Haque PTA [CJ] Daylin Rebolledo, OT     Row Name 03/09/20 1528             Chair-Bed Transfer    Chair-Bed [place] (Transfers)  verbal cues;nonverbal cues (demo/gesture);contact guard  -RG      Assistive Device (Chair-Bed Transfers)  wheelchair  -RG      Recorded by [RG] Prakash Haque PTA      Row Name 03/09/20 1528             Sit-Stand Transfer    Sit-Stand [place] (Transfers)  verbal cues;nonverbal cues (demo/gesture);contact guard  -RG       Assistive Device (Sit-Stand Transfers)  walker, front-wheeled  -RG      Recorded by [RG] Prakash Haque PTA      Row Name 03/09/20 1528             Stand-Sit Transfer    Stand-Sit [place] (Transfers)  verbal cues;nonverbal cues (demo/gesture);contact guard  -RG      Assistive Device (Stand-Sit Transfers)  walker, front-wheeled  -RG      Recorded by [RG] Prakash Haque PTA      Row Name 03/09/20 1528             Stand Pivot/Stand Step Transfer    Stand Pivot/Stand Step [place]  verbal cues;nonverbal cues (demo/gesture);contact guard  -RG      Assistive Device (Stand Pivot Stand Step Transfer)  walker, front-wheeled  -RG      Recorded by [RG] Prakash Haque PTA      Row Name 03/09/20 1505             Toilet Transfer    [place] Level (Toilet Transfer)  contact guard;verbal cues  -CJ      Assistive Device (Toilet Transfer)  grab bars/safety frame  -CJ      Recorded by [CJ] Daylin Rebolledo OT      Row Name 03/09/20 1528             Gait/Stairs Assessment/Training    Gait/Stairs Assessment/Training  gait/ambulation independence;gait/ambulation assistive device;distance ambulated;gait pattern;gait deviations  -RG      [place] Level (Gait)  verbal cues;nonverbal cues (demo/gesture) SBA  -RG      Assistive Device (Gait)  walker, front-wheeled  -RG      Distance in Feet (Gait)  360' in am and 320' in pm  -RG      Pattern (Gait)  step-through  -RG      Deviations/Abnormal Patterns (Gait)  dmitry decreased;stride length decreased  -RG      Bilateral Gait Deviations  forward flexed posture  -RG      Recorded by [RG] Prakash Haque PTA      Row Name 03/09/20 1528             Safety Issues, Functional Mobility    Impairments Affecting Function (Mobility)  balance;coordination;endurance/activity tolerance;strength  -RG      Recorded by [RG] Prakash Haque PTA      Row Name 03/09/20 1505             Grooming Assessment/Treatment    Grooming [place] Level  set up;supervision;verbal cues  -CJ       Recorded by [CJ] Daylin Rebolledo, OT      Row Name 03/09/20 1505             Toileting Assessment/Treatment    Toileting [place] Level  minimum assist (75% patient effort);verbal cues  -CJ      Assistive Device Use (Toileting)  grab bar/safety frame  -CJ      Recorded by [CJ] Daylin Rebolledo, OT      Row Name 03/09/20 1528             Vision Assessment/Intervention    Visual Impairment/Limitations  corrective lenses full time  -RG      Recorded by [RG] Prakash Haque PTA      Row Name 03/09/20 1528             Pain Scale: FACES Pre/Post-Treatment    Pain: FACES Scale, Pretreatment  0-->no hurt  -RG      Pain: FACES Scale, Post-Treatment  0-->no hurt  -RG      Recorded by [RG] Prakash Haque PTA      Row Name 03/09/20 1528             Balance    Balance  static sitting balance;static standing balance;dynamic sitting balance;dynamic standing balance;standing balance activity;dynamic balance activity  -RG      Recorded by [RG] Prakash Haque PTA      Row Name 03/09/20 1505             Upper Extremity Seated Therapeutic Exercise    Device, Seated Upper Extremity (Therapeutic Exercise)  -- dowel ex-wrist rolls X 2;  theraputty  -      Exercise Type, Seated Upper Extremity (Therapeutic Exercise)  AROM (active range of motion) BUE ther ex/act, bilat coord ex, GMC/FMC  -      Expected Outcomes, Seated Upper Extremity (Therapeutic Exercise)  improve functional tolerance, self-care activity;improve performance, BADLs;improve performance, transfer skills;improve performance, gross motor coordination skills;improve performance, fine motor coordination skills  -CJ      Recorded by [CJ] Daylin Rebolledo, OT      Row Name 03/09/20 1528             Lower Extremity Standing Therapeutic Exercise    Performed, Standing Lower Extremity (Therapeutic Exercise)  hip flexion/extension;heel raises;hip abduction/adduction H-T walk on foam beam  -RG      Device, Standing Lower Extremity (Therapeutic Exercise)  parallel bars  -RG       Exercise Type, Standing Lower Extremity (Therapeutic Exercise)  AROM (active range of motion)  -RG      Expected Outcomes, Standing Lower Extremity (Therapeutic Exercise)  improve functional tolerance, community activity;improve functional tolerance, household activity;improve functional tolerance, self-care activity;improve performance, gait skills;improve performance, transfer skills  -RG      Sets/Reps Detail, Standing Lower Extremity (Therapeutic Exercise)  20  -RG      Comment, Standing Lower Extremity (Therapeutic Exercise)  tactile cues for task initiation  -RG      Recorded by [RG] Prakash Haque PTA      Row Name 03/09/20 1528             Lower Extremity Seated Therapeutic Exercise    Performed, Seated Lower Extremity (Therapeutic Exercise)  hip flexion/extension;hip abduction/adduction;knee flexion/extension;LAQ (long arc quad), knee extension;ankle dorsiflexion/plantarflexion  -RG      Device, Seated Lower Extremity (Therapeutic Exercise)  elastic bands/tubing;free weights, cuff;small ball  -RG      Exercise Type, Seated Lower Extremity (Therapeutic Exercise)  AROM (active range of motion)  -RG      Expected Outcomes, Seated Lower Extremity (Therapeutic Exercise)  improve functional tolerance, community activity;improve functional tolerance, household activity;improve functional tolerance, self-care activity;improve performance, gait skills;improve performance, transfer skills  -RG      Sets/Reps Detail, Seated Lower Extremity (Therapeutic Exercise)  20  -RG      Comment, Seated Lower Extremity (Therapeutic Exercise)  Cues for task initiation.   -RG      Recorded by [RG] Prakash Haque PTA      Row Name 03/09/20 1528             Lower Extremity Supine Therapeutic Exercise    Performed, Supine Lower Extremity (Therapeutic Exercise)  hip abduction/adduction;SAQ (short arc quad) over bolster;SLR (straight leg raise);ankle pumps;heel slides;hip external/internal rotation  -RG      Device, Supine Lower  Extremity (Therapeutic Exercise)  foam roll  -RG      Exercise Type, Supine Lower Extremity (Therapeutic Exercise)  AROM (active range of motion)  -RG      Expected Outcomes, Supine Lower Extremity (Therapeutic Exercise)  improve functional tolerance, community activity;improve functional tolerance, household activity;improve functional tolerance, self-care activity;improve performance, gait skills;improve performance, transfer skills  -RG      Recorded by [RG] Prakash Haque PTA      Row Name 03/09/20 1528 03/09/20 1505          Positioning and Restraints    Pre-Treatment Position  sitting in chair/recliner  -RG  --     Post Treatment Position  --  wheelchair  -CJ     In Bed  notified nsg;supine;call light within reach;encouraged to call for assist;legs elevated  -RG  --     In Wheelchair  --  sitting;call light within reach;encouraged to call for assist;notified nsg;with PT in room- post am tx;  with PT - pm tx  -CJ     Recorded by [RG] Prakash Haque PTA [CJ] Daylin Rebolledo OT     Row Name 03/09/20 1528             Daily Summary of Progress (PT)    Impairments Continuing to Limit Function: Physical Therapy  strength decreased;impaired balance;impaired communication;impaired functional activity tolerance  -RG      Recorded by [RG] Prakash Haque PTA        User Key  (r) = Recorded By, (t) = Taken By, (c) = Cosigned By    Initials Name Effective Dates    CJ Daylin Rebolledo OT 04/03/18 -     RG Prakash Haque PTA 10/31/19 -                  PT Recommendation and Plan        Daily Summary of Progress (PT)  Impairments Continuing to Limit Function: Physical Therapy: strength decreased, impaired balance, impaired communication, impaired functional activity tolerance               Time Calculation:     PT Charges     Row Name 03/09/20 1537 03/09/20 1536          Time Calculation    Start Time  1330  -RG  0915  -RG     Stop Time  1405  -RG  1010  -RG     Time Calculation (min)  35 min  -RG  55 min  -RG     PT  Received On  --  03/09/20  -RG     PT Goal Re-Cert Due Date  --  03/12/20  -RG        Time Calculation- PT    Total Timed Code Minutes- PT  35 minute(s)  -RG  55 minute(s)  -RG       User Key  (r) = Recorded By, (t) = Taken By, (c) = Cosigned By    Initials Name Provider Type    Prakash Broussard PTA Physical Therapy Assistant          Therapy Charges for Today     Code Description Service Date Service Provider Modifiers Qty    14799105933 HC GAIT TRAINING EA 15 MIN 3/9/2020 Prakash Haque PTA GP 3    01917128113 HC PT THERAPEUTIC ACT EA 15 MIN 3/9/2020 Prakash Haque PTA GP 1    59911357576 HC PT THER PROC EA 15 MIN 3/9/2020 Prakash Haque PTA GP 2                   Prakash Haque PTA  3/9/2020

## 2020-03-09 NOTE — SIGNIFICANT NOTE
03/09/20 0904   Plan   Plan Spoke to Zeina at [facility] [phone] who states she is unable to retrieve referral from [facility] and requests [facility] fax referral this am.  Faxed face sheet, H&P, PT/OT/SLP notes, x-ray, labs, medication list/active orders, and MD progress notes to [phone].  Zeina to contact [facility] about admission.

## 2020-03-10 PROCEDURE — 97116 GAIT TRAINING THERAPY: CPT

## 2020-03-10 PROCEDURE — 97110 THERAPEUTIC EXERCISES: CPT

## 2020-03-10 PROCEDURE — 92507 TX SP LANG VOICE COMM INDIV: CPT | Performed by: SPEECH-LANGUAGE PATHOLOGIST

## 2020-03-10 PROCEDURE — 97535 SELF CARE MNGMENT TRAINING: CPT

## 2020-03-10 PROCEDURE — 97530 THERAPEUTIC ACTIVITIES: CPT

## 2020-03-10 RX ADMIN — FAMOTIDINE 40 MG: 20 TABLET, FILM COATED ORAL at 08:53

## 2020-03-10 RX ADMIN — POTASSIUM CHLORIDE 10 MEQ: 750 TABLET, FILM COATED, EXTENDED RELEASE ORAL at 08:53

## 2020-03-10 RX ADMIN — IMIPRAMINE HYDROCHLORIDE 10 MG: 10 TABLET ORAL at 21:53

## 2020-03-10 RX ADMIN — ATORVASTATIN CALCIUM 40 MG: 40 TABLET, FILM COATED ORAL at 21:53

## 2020-03-10 RX ADMIN — CARVEDILOL 12.5 MG: 25 TABLET, FILM COATED ORAL at 08:53

## 2020-03-10 RX ADMIN — CARVEDILOL 12.5 MG: 25 TABLET, FILM COATED ORAL at 17:16

## 2020-03-10 RX ADMIN — MAGNESIUM GLUCONATE 500 MG ORAL TABLET 400 MG: 500 TABLET ORAL at 12:05

## 2020-03-10 RX ADMIN — FUROSEMIDE 20 MG: 20 TABLET ORAL at 08:53

## 2020-03-10 RX ADMIN — ASPIRIN 81 MG: 81 TABLET, CHEWABLE ORAL at 08:53

## 2020-03-10 NOTE — THERAPY TREATMENT NOTE
Inpatient Rehabilitation - Physical Therapy Treatment Note   [place]     Patient Name: Connie Arango  : 1940  MRN: [id number]    Today's Date: 3/10/2020                 Admit Date: 2020      Visit Dx:    No diagnosis found.    Patient Active Problem List   Diagnosis   • ICH (intracerebral hemorrhage) (CMS/HCC)       Therapy Treatment    IRF Treatment Summary     Row Name 03/10/20 151             Evaluation/Treatment Time and Intent    Subjective Information  no complaints  -RG      Existing Precautions/Restrictions  fall;seizures  -RG      Document Type  therapy note (daily note)  -RG      Mode of Treatment  physical therapy  -RG      Patient/Family Observations  Pt and nursing in agreement for skilled PT on this date.   -RG      Recorded by [RG] Prakash Haque PTA      Row Name 03/10/20 151             Cognition/Psychosocial- PT/OT    Affect/Mental Status (Cognitive)  WFL  -RG      Follows Commands (Cognition)  physical/tactile prompts required  -RG      Personal Safety Interventions  gait belt;nonskid shoes/slippers when out of bed;supervised activity  -RG      Cognitive Function (Cognitive)  safety deficit;unable/difficult to assess  -RG      Attention Deficit (Cognitive)  requires cues/redirection to task  -RG      Recorded by [RG] Prakash Haque PTA      Row Name 03/10/20 151             Mobility    Extremity Weight-bearing Status  left lower extremity;right lower extremity  -RG      Left Lower Extremity (Weight-bearing Status)  full weight-bearing (FWB)  -RG      Right Lower Extremity (Weight-bearing Status)  full weight-bearing (FWB)  -RG      Recorded by [RG] Prakash Haque PTA      Row Name 03/10/20 151             Bed Mobility Assessment/Treatment    Bed Mobility Assessment/Treatment  sit-supine  -RG      Sit-Supine [place] (Bed Mobility)  contact guard;verbal cues;nonverbal cues (demo/gesture)  -RG      Bed Mobility, Safety Issues  decreased use of arms for  pushing/pulling;decreased use of legs for bridging/pushing  -RG      Assistive Device (Bed Mobility)  bed rails  -RG      Recorded by [RG] Prakash Haque PTA      Row Name 03/10/20 1516             Transfer Assessment/Treatment    Transfer Assessment/Treatment  sit-stand transfer;stand-sit transfer;stand pivot/stand step transfer  -RG      Recorded by [RG] Prakash Haque PTA      Row Name 03/10/20 1516             Bed-Chair Transfer    Bed-Chair [place] (Transfers)  verbal cues;nonverbal cues (demo/gesture);stand by assist  -RG      Assistive Device (Bed-Chair Transfers)  wheelchair  -RG      Recorded by [RG] Prakash Haque PTA      Row Name 03/10/20 1516             Chair-Bed Transfer    Chair-Bed [place] (Transfers)  verbal cues;nonverbal cues (demo/gesture);stand by assist  -RG      Assistive Device (Chair-Bed Transfers)  wheelchair  -RG      Recorded by [RG] Prakash Haque PTA      Row Name 03/10/20 1516             Sit-Stand Transfer    Sit-Stand [place] (Transfers)  verbal cues;nonverbal cues (demo/gesture);stand by assist  -RG      Assistive Device (Sit-Stand Transfers)  walker, front-wheeled  -RG      Recorded by [RG] Prakash Haque PTA      Row Name 03/10/20 1516             Stand-Sit Transfer    Stand-Sit [place] (Transfers)  verbal cues;nonverbal cues (demo/gesture);stand by assist  -RG      Assistive Device (Stand-Sit Transfers)  walker, front-wheeled  -RG      Recorded by [RG] Prakash Haque PTA      Row Name 03/10/20 1516             Stand Pivot/Stand Step Transfer    Stand Pivot/Stand Step [place]  verbal cues;nonverbal cues (demo/gesture);stand by assist  -RG      Assistive Device (Stand Pivot Stand Step Transfer)  walker, front-wheeled  -RG      Recorded by [RG] Prakash Haque PTA      Row Name 03/10/20 1516             Gait/Stairs Assessment/Training    Gait/Stairs Assessment/Training  gait/ambulation independence;gait/ambulation assistive device;distance  ambulated;gait pattern;gait deviations  -RG      [place] Level (Gait)  verbal cues;nonverbal cues (demo/gesture) SBA  -RG      Assistive Device (Gait)  walker, front-wheeled  -RG      Distance in Feet (Gait)  320' in am and 320' in pm  -RG      Pattern (Gait)  step-through  -RG      Deviations/Abnormal Patterns (Gait)  dmitry decreased;stride length decreased  -RG      Bilateral Gait Deviations  forward flexed posture  -RG      Recorded by [RG] Prakash Haque PTA      Row Name 03/10/20 151             Safety Issues, Functional Mobility    Impairments Affecting Function (Mobility)  balance;coordination;endurance/activity tolerance;strength  -RG      Recorded by [RG] Prakash Haque PTA      Row Name 03/10/20 151             Vision Assessment/Intervention    Visual Impairment/Limitations  corrective lenses full time  -RG      Recorded by [RG] Prakash Haque PTA      Row Name 03/10/20 151             Pain Scale: FACES Pre/Post-Treatment    Pain: FACES Scale, Pretreatment  0-->no hurt  -RG      Pain: FACES Scale, Post-Treatment  0-->no hurt  -RG      Recorded by [RG] Prakash Haque PTA      Row Name 03/10/20 151             Balance    Balance  static sitting balance;static standing balance;dynamic sitting balance;dynamic standing balance;standing balance activity;dynamic balance activity  -RG      Recorded by [RG] Prakash Haque PTA      Row Name 03/10/20 151             Dynamic Sitting Balance    Level of [place], Reaches Outside Midline (Sitting, Dynamic Balance)  supervision  -RG      Comment, Reaches Outside Midline (Sitting, Dynamic Balance)  balloon tap  -RG      Recorded by [RG] Prakash Haque PTA      Row Name 03/10/20 151             Static Standing Balance    Level of [place] (Supported Standing, Static Balance)  minimal assist, 75% patient effort;contact guard assist  -RG      Time Able to Maintain Position (Supported Standing, Static Balance)  30 to 45 seconds  -RG      Assistive  Device Utilized (Supported Standing, Static Balance)  parallel bars  -RG      Comment (Supported Standing, Static Balance)  static standing on foam pad  -RG      Recorded by [RG] Prakash Haque PTA      Row Name 03/10/20 1516             Dynamic Standing Balance    Level of [place], Reaches Outside Midline (Standing, Dynamic Balance)  contact guard assist  -RG      Comment, Resists Mild Perturbations (Sitting, Dynamic Balance)  LE exercises in //  -RG      Recorded by [RG] Prakash Haque PTA      Row Name 03/10/20 1516             Standing Balance Activity    Activities Performed (Standing, Balance Training)  standing on foam roll  -RG      Support Needed for Balance (Standing, Balance Training)  uses at least one upper extremity for support  -RG      Comment (Standing, Balance Training)  H-T, side to side walking on foam beam  -RG      Recorded by [RG] Prakash Haque PTA      Row Name 03/10/20 1516             Lower Extremity Standing Therapeutic Exercise    Performed, Standing Lower Extremity (Therapeutic Exercise)  hip flexion/extension;hip abduction/adduction;mini-squats;heel raises  -RG      Device, Standing Lower Extremity (Therapeutic Exercise)  parallel bars  -RG      Exercise Type, Standing Lower Extremity (Therapeutic Exercise)  AROM (active range of motion)  -RG      Expected Outcomes, Standing Lower Extremity (Therapeutic Exercise)  improve functional tolerance, community activity;improve functional tolerance, household activity;improve functional tolerance, self-care activity;improve performance, gait skills;improve performance, transfer skills  -RG      Sets/Reps Detail, Standing Lower Extremity (Therapeutic Exercise)  20  -RG      Comment, Standing Lower Extremity (Therapeutic Exercise)  tactile cues for task initiation  -RG      Recorded by [RG] Prakash Haque PTA      Row Name 03/10/20 1516             Lower Extremity Seated Therapeutic Exercise    Performed, Seated Lower Extremity  (Therapeutic Exercise)  hip flexion/extension;hip abduction/adduction;hip external/internal rotation;knee flexion/extension;LAQ (long arc quad), knee extension;ankle dorsiflexion/plantarflexion  -RG      Device, Seated Lower Extremity (Therapeutic Exercise)  elastic bands/tubing;free weights, cuff;small ball  -RG      Exercise Type, Seated Lower Extremity (Therapeutic Exercise)  AROM (active range of motion)  -RG      Expected Outcomes, Seated Lower Extremity (Therapeutic Exercise)  improve functional tolerance, community activity;improve functional tolerance, household activity;improve functional tolerance, self-care activity;improve performance, gait skills;improve performance, transfer skills  -RG      Sets/Reps Detail, Seated Lower Extremity (Therapeutic Exercise)  20  -RG      Comment, Seated Lower Extremity (Therapeutic Exercise)  Cues for task initiation  -RG      Recorded by [RG] Prakash Haque PTA      Row Name 03/10/20 1516             Positioning and Restraints    Pre-Treatment Position  sitting in chair/recliner  -RG      Post Treatment Position  bed  -RG      In Bed  notified nsg;supine;call light within reach;encouraged to call for assist;side rails up x3;legs elevated  -RG      Recorded by [RG] Prakash Haque PTA      Row Name 03/10/20 1516             Daily Summary of Progress (PT)    Impairments Continuing to Limit Function: Physical Therapy  strength decreased;impaired balance;impaired communication;impaired functional activity tolerance  -RG      Recorded by [RG] Prakash Haque PTA        User Key  (r) = Recorded By, (t) = Taken By, (c) = Cosigned By    Initials Name Effective Dates    RG Prakash Haque PTA 10/31/19 -                  PT Recommendation and Plan        Daily Summary of Progress (PT)  Impairments Continuing to Limit Function: Physical Therapy: strength decreased, impaired balance, impaired communication, impaired functional activity tolerance               Time Calculation:      PT Charges     Row Name 03/10/20 1527 03/10/20 1525          Time Calculation    Start Time  1330  -RG  0920  -RG     Stop Time  1415  -RG  1005  -RG     Time Calculation (min)  45 min  -RG  45 min  -RG     PT Received On  --  03/10/20  -RG     PT Goal Re-Cert Due Date  --  03/12/20  -RG        Time Calculation- PT    Total Timed Code Minutes- PT  45 minute(s)  -RG  45 minute(s)  -RG       User Key  (r) = Recorded By, (t) = Taken By, (c) = Cosigned By    Initials Name Provider Type    Prakash Broussard PTA Physical Therapy Assistant          Therapy Charges for Today     Code Description Service Date Service Provider Modifiers Qty    16243871644 HC GAIT TRAINING EA 15 MIN 3/9/2020 Prakash Haque, JD GP 3    98778617426 HC PT THERAPEUTIC ACT EA 15 MIN 3/9/2020 Prakash Haque, JD GP 1    22958707470 HC PT THER PROC EA 15 MIN 3/9/2020 Prakash Haque, JD GP 2    14394468316 HC GAIT TRAINING EA 15 MIN 3/10/2020 Prakash Haque PTA GP 2    97238482350 HC PT THERAPEUTIC ACT EA 15 MIN 3/10/2020 Prakash Haque, JD GP 1    82187891647 HC PT THER PROC EA 15 MIN 3/10/2020 Prakash Haque PTA GP 3                   Prakash Haque PTA  3/10/2020

## 2020-03-10 NOTE — PROGRESS NOTES
Case Management  Inpatient Rehabilitation Team Conference    Conference Date/Time: 3/10/2020 8:12:32 AM    Team Conference Attendees:  MD Erendira Ferguson, EVELYNE Estrada, MALCOLM,   MALCOLM Peterson, PT  Daylin Rebolledo, OT  PRANAV Owusu    Demographics            Age: 79Y            Gender: Female    Admission Date: 2/26/2020 4:32:00 PM  Rehabilitation Diagnosis:  multifocal CVAs/left ICH  Comorbidities: J96.01 Acute respiratory failure with hypoxia  G93.40 Encephalopathy, unspecified  J90 Pleural effusion, not elsewhere classified  I95.9 Hypotension, unspecified  I10 Essential (primary) hypertension  E78.5 Hyperlipidemia, unspecified  I25.10 Atherosclerotic heart disease of native coronary artery without angina  pectoris  K21.9 Gastro-esophageal reflux disease without esophagitis  R53.81 Other malaise  R47.01 Aphasia  R13.10 Dysphagia, unspecified      Plan of Care  Anticipated Discharge Date/Estimated Length of Stay: 3-12-20  Anticipated Discharge Destination: Community discharge with assistance  Discharge Plan : Pt plans to return home with son assisting with care if he is  able to meet caregiving needs at discharge.  Medical Necessity Expected Level Rationale: fair  Intensity and Duration: an average of 3 hours/5 days per week  Medical Supervision and 24 Hour Rehab Nursing: x  Physical Therapy: x  PT Intensity/Duration: PT 1-1.5 hours per day/5 days per week  Occupational Therapy: x  OT Intensity/Duration: OT 1-1.5 hours per day/5 days per week  Speech and Language Therapy: x  SLP Intensity/Duration: SLP 30 mins-90 mins per day/5 days per week  Social Work: x  Therapeutic Recreation: x  Updated (if changes indicated)    Anticipated Discharge Date/Estimated Length of Stay:   Pending      Discharge Plan of Care:    Based on the patient's medical and functional status, their prognosis and  expected level of functional improvement is:  fair-good      Interdisciplinary Problem/Goals/Status  Communication    [ST] Expression(Active)  Current Status(02/29/2020): Pt presents w/ moderately severe-severe expressive  deficits.  Weekly Goal(03/02/2020): Pt will verbally produce biographical info 3/5 opp.  Discharge Goal: Pt will improve expressive language skills to allow for maximal  communication and safety in adls.    [ST] Comprehension(Active)  Current Status(02/29/2020): Pt presents w/ moderately severe-severe receptive  deficits.  Weekly Goal(03/02/2020): Pt will identify common tangible objects from fo2.  Discharge Goal: Pt will improve receptive language skills to allow for maximal  communication and safety in adls.        Mobility    [PT] Bed/Chair/Wheelchair(Active)  Current Status(03/09/2020): CGA  Weekly Goal(03/16/2020): Sup  Discharge Goal: Sup    [PT] Walk(Active)  Current Status(03/09/2020): amb 360' RW SBA  Weekly Goal(03/16/2020): amb 150' RW CGA  Discharge Goal: amb 300' RW Sup        Pain    [RN] Pain Management(Active)  Current Status(02/26/2020): Potential for pain  Weekly Goal(03/12/2020): No pain this week  Discharge Goal: no pain        Safety    [RN] Potential for Injury(Active)  Current Status(02/26/2020): At risk for injury  Weekly Goal(03/16/2020): No injury this week  Discharge Goal: No injury        Self Care    [OT] Dressing (Lower)(Active)  Current Status(03/09/2020): Sb  Weekly Goal(03/17/2020): CGA  Discharge Goal: CGA    [OT] Toileting(Active)  Current Status(03/09/2020): Sb  Weekly Goal(03/17/2020): CGA  Discharge Goal: CGA    Comments: Pt is pending SNF placement for continued rehab before returning home  with son assisting with caregiving needs.    Signed by: EVELYNE Berger    Physician CoSigned By: Sean Coppola 03/10/2020 19:35:44

## 2020-03-10 NOTE — PLAN OF CARE
Pt actively participated in formal s/l/cog tx this am and pm for 90 minutes. Continues to make progress towards her goals. Continues to improve spontaneous speech as well as stimulability for table top tasks. Continue tx per poc.  Problem: Patient Care Overview  Goal: Plan of Care Review  Outcome: Ongoing (interventions implemented as appropriate)     Problem: Communication Impairment (IRF) (Adult)  Goal: Optimal/Safe Level of Communication [place]  Outcome: Ongoing (interventions implemented as appropriate)  Flowsheets (Taken 2/29/2020 1409 by Patience Yanes MA,CCC-SLP)  Optimal/Safe Level of Communication [place]: demonstrating adequate progress  Goal: Optimal Quality of Life in Relation to Communication  Outcome: Ongoing (interventions implemented as appropriate)  Flowsheets (Taken 2/29/2020 1409 by Patience Yanes MA,CCC-SLP)  Optimal Quality of Life in Relation to Communication: demonstrating adequate progress

## 2020-03-10 NOTE — PROGRESS NOTES
PROGRESS NOTE         Patient Identification:  Name:  Connie Arango  Age:  79 y.o.  Sex:  female  :  1940  MRN:  [id number]  Visit Number:  [id number]  Primary Care Provider:  Luis Enrique Prieto MD         LOS: 14 days       ----------------------------------------------------------------------------------------------------------------------  Subjective       Chief Complaints:    Intracranial hemorrhage   Encephalopathy   debility      Interval History:      Vitals are stable and labs are within normal limit.  Patient has no complaints this morning without any chest pain shortness of breath nausea vomiting or diarrhea.    Patient participated with occupational therapy, physical therapy, and speech therapy on 3/10/20. Patient's mobility is at full weight-bearing on both sides. Bed mobility is at contact guard with verbal and nonverbal cues with decreased use of arms for pushing/pulling and decreased use of legs for bridging/pushing but using the bed rails. Sit-stand transfer is at stand by assist with verbal and nonverbal cues and using the front-wheeled walker. Patient ambulated for 320 feet in the AM and 320 feet in the PM at stand by assist with verbal and nonverbal cues and using a front-wheeled walker.     Patient participated with speech therapy, physical therapy, and occupational therapy on 3/9/20. Her mobility is at full weight-bearing on both sides. Speech therapy updated the plan of care to refect the following notations: Patient actively participated in formal speech language treatment on this date and she continues to make progress towards her goals.    Problem: Communication Impairment (IRF) (Adult)  Goal: Optimal/Safe Level of Communication [place]  Outcome: Ongoing (interventions implemented as appropriate)  Optimal/Safe Level of Communication [place]: demonstrating adequate progress  Goal: Optimal Quality of Life in Relation to Communication  Outcome: Ongoing  (interventions implemented as appropriate)      Review of Systems:    Constitutional: no fever, chills and night sweats.  Eyes: no eye drainage, itching or redness.  HEENT: no mouth sores, dysphagia or nose bleed.  Respiratory: no for shortness of breath, cough or production of sputum.  Cardiovascular: no chest pain, no palpitations, no orthopnea.  Gastrointestinal: no nausea, vomiting or diarrhea. No abdominal pain, hematemesis or rectal bleeding.  Genitourinary: no dysuria or polyuria.  Hematologic/lymphatic: no lymph node abnormalities, no easy bruising or easy bleeding.  Musculoskeletal: no muscle or joint pain.  Skin: No rash and no itching.  Neurological: no loss of consciousness, no seizure, no headache.  Behavioral/Psych: no depression or suicidal ideation.  Endocrine: no hot flashes.  Immunologic: negative.  ----------------------------------------------------------------------------------------------------------------------      Objective       [facility] Meds:    aspirin 81 mg Oral Daily   atorvastatin 40 mg Oral Nightly   carvedilol 12.5 mg Oral BID With Meals   famotidine 40 mg Oral Daily   furosemide 20 mg Oral Daily   imipramine 10 mg Oral Nightly   magnesium oxide 400 mg Oral Daily   potassium chloride 10 mEq Oral Daily        ----------------------------------------------------------------------------------------------------------------------    Vital Signs:  Temp:  [97.8 °F (36.6 °C)-98.4 °F (36.9 °C)] 97.8 °F (36.6 °C)  Heart Rate:  [74-79] 77  Resp:  [18-20] 20  BP: (105-132)/(55-66) 105/66  No data found.  SpO2 Percentage    03/09/20 1905 03/10/20 0705 03/10/20 1905   SpO2: 96% 95% 96%     SpO2:  [95 %-96 %] 96 %  on   ;   Device (Oxygen Therapy): room air    Body mass index is 26.45 kg/m².  Wt Readings from Last 3 Encounters:   02/26/20 72 kg (158 lb 11.7 oz)        Intake/Output Summary (Last 24 hours) at 3/11/2020 0656  Last data filed at 3/11/2020 0459  Gross per 24 hour   Intake 1560  ml   Output --   Net 1560 ml     Diet Pureed; Thin  ----------------------------------------------------------------------------------------------------------------------      Physical Exam:     General Appearance: alert and pleasant.  Very comfortable and in nonacute distress.    Head: normocephalic, without obvious abnormality and atraumatic     Eyes: lids and lashes normal, conjunctivae and sclerae normal, no icterus, no pallor, corneas clear and PERRLA     Ears: ears appear intact with no abnormalities noted     Nose: nares normal, septum midline, mucosa normal and no drainage                Throat: no oral lesions, no thrush, oral mucosa moist and oopharynx normal     Neck: no adenopathy, supple, trachea midline, no thyromegaly, no carotid bruit and no JVD     Back: no kyphosis present, no scoliosis present, no skin lesions, erythema, or scars, no tenderness to percussion or palpation and range of motion normal     Lungs: clear to auscultation, respirations regular, respirations even and  respirations unlabored. No accessory muscle use.      Heart:: regular rhythm & normal rate, normal S1, S2, no murmur, no gallop, no rub and no click.  Chest wall with no abnormalities observed. PMI nondisplaced     Abdomen: normal bowel sounds in all quadrants, no masses, no hepatomegaly, no splenomegaly, soft non-tender, no guarding and no rebound tenderness     Extremities: no edema, no cyanosis, no redness, no tenderness, no clubbing     Musculoskeletal: joints with no effusion nor erythema nor warmth.  Pedal pulses palpable and equal bilaterally     Skin: no bleeding, bruising or rash and no lesions noted     Lymph Nodes: no palpable adenopathy     Neurologic: Alert and awake.  Mild aphasia.              Sensory intact in BLE and BUE.              Motor strength Generalized weakness  ----------------------------------------------------------------------------------------------------------------------            Results  from last 7 days   Lab Units 03/06/20  0152   WBC 10*3/mm3 4.80   HEMOGLOBIN g/dL 11.2*   HEMATOCRIT % 35.6   MCV fL 89.0   MCHC g/dL 31.5   PLATELETS 10*3/mm3 193     Results from last 7 days   Lab Units 03/06/20  0152   SODIUM mmol/L 142   POTASSIUM mmol/L 3.9   MAGNESIUM mg/dL 2.3   CHLORIDE mmol/L 106   CO2 mmol/L 23.7   BUN mg/dL 11   CREATININE mg/dL 0.74   EGFR IF NONAFRICN AM mL/min/1.73 76   CALCIUM mg/dL 9.2   GLUCOSE mg/dL 101*   ALBUMIN g/dL 3.16*   BILIRUBIN mg/dL 0.4   ALK PHOS U/L 73   AST (SGOT) U/L 34*   ALT (SGPT) U/L 26   Estimated Creatinine Clearance: 55.7 mL/min (by C-G formula based on SCr of 0.74 mg/dL).  No results found for: AMMONIA    No results found for: HGBA1C, POCGLU  No results found for: HGBA1C  No results found for: TSH, FREET4    No results found for: BLOODCX  No results found for: URINECX  No results found for: WOUNDCX  No results found for: STOOLCX  No results found for: RESPCX  Pain Management Panel     There is no flowsheet data to display.            ----------------------------------------------------------------------------------------------------------------------  Imaging Results (Last 24 Hours)     ** No results found for the last 24 hours. **          ----------------------------------------------------------------------------------------------------------------------    Assessment/Plan       Assessment/Plan     ASSESSMENT:    Multifocal CVA/left parietotemporal ICH   Small acute corical ischemic infarcts in the left parietal Lobe and right parieto-occipital region  Left parietotemporal hemorrhage  Encephalopathy  Acute hypoxia respiratory failure  HTN  Hypotension  Small bilateral pleural effusions  Dysphagia  Global aphasia     PLAN:    Vitals are stable and labs are within normal limit.  Patient has no complaints this morning without any chest pain shortness of breath nausea vomiting or diarrhea.    Patient participated with occupational therapy, physical therapy, and  speech therapy on 3/10/20. Patient's mobility is at full weight-bearing on both sides. Bed mobility is at contact guard with verbal and nonverbal cues with decreased use of arms for pushing/pulling and decreased use of legs for bridging/pushing but using the bed rails. Sit-stand transfer is at stand by assist with verbal and nonverbal cues and using the front-wheeled walker. Patient ambulated for 320 feet in the AM and 320 feet in the PM at stand by assist with verbal and nonverbal cues and using a front-wheeled walker.     Patient participated with speech therapy, physical therapy, and occupational therapy on 3/9/20. Her mobility is at full weight-bearing on both sides. Speech therapy updated the plan of care to refect the following notations: Patient actively participated in formal speech language treatment on this date and she continues to make progress towards her goals.    Problem: Communication Impairment (IRF) (Adult)  Goal: Optimal/Safe Level of Communication [place]  Outcome: Ongoing (interventions implemented as appropriate)  Optimal/Safe Level of Communication [place]: demonstrating adequate progress  Goal: Optimal Quality of Life in Relation to Communication  Outcome: Ongoing (interventions implemented as appropriate)      Code Status:   Code Status and Medical Interventions:   Ordered at: 02/26/20 1948     Code Status:    CPR     Medical Interventions (Level of Support Prior to Arrest):    Full       Sean Coppola MD  03/11/20  6:54 AM

## 2020-03-10 NOTE — THERAPY TREATMENT NOTE
"Inpatient Rehabilitation - Speech Language Pathology Treatment Note  [facility]     Patient Name: Connie Arango  : 1940  MRN: [id number]  Today's Date: 3/10/2020       Admit Date: 2020  Visit Dx:    No diagnosis found.  Patient Active Problem List   Diagnosis   • ICH (intracerebral hemorrhage) (CMS/formerly [facility])        Therapy Treatment     Ms. Arango is seen this am and pm in speech therapy office for formal s/l therapy to address deficits. She is positioned upright and centered in wheelchair w/ gait belt attached. She is a/a cooperative to participate. She is evidenced w/ improvement in table top tasks this am/pm.    Long Term Goal:  1.Pt will improve receptive language skills to allow for maximal communication and safety in adls.   2.Pt will improve expressive language skills to allow for maximal communication and safety in adls.      Short Term Goals:  1. Pt will receptively identify common tangible objects from field of 2 w/ 50% acc and mod cues over 3 consecutive sessions.   *pt is able to identify \"sock, ball, mug, spoon, fork, pen, dice, hammer, comb, knife, cup, plate\" from FO3 given min-mod cues w/ 80% acc; Pt is able to match letters A-Z from FO3 w/ 95% acc given mod cues.  2. Pt will receptively demonstrate object fnx w/ 60% acc and mod cues over 3 consecutive sessions.   *pt able to receptively demonstrate object fnx of \"marker, spoon, cup, comb, brush, and mug\" w/ 75% acc given mod-max cues to initiate fnx of objects. Significant perseveration during this task.  3. Pt will id body parts w/ 40% acc and mod cues over 3 consecutive sessions.   *not directly addressed this session.  4. Pt will follow simple one step directives 3/5 opp w/ mod-max cues over 3 consecutive sessions.   *pt able to follow simple one step directives related to table top tasks w/ 80% acc given mod-max cues. Pt often requires multiple re-directions during tasks to sustain attention.  5. Pt will verbally produce biographical " "information 3/5 opp w/ mod-max cues over 3 consecutive session.   *pt verbally produces first and last name given mod cues w/ written name on table top. Pt is able to copy first and last name on table w/ mod max cues. She is also able to verbalize her son's name and her  given mod-max cues.  6. Pt will verbally respond to simple y/n questions w/ 75% acc and mod cues over 3 consecutive sessions.   *pt verbally responds to simple y/n questions w/ 65% acc related to adls, table top tasks. Pt often perseverates during this task.   7. Pt will confrontation name common tangible objects 2/5 opp w/ mod-max cues over 3 consecutive sessions.   *pt able to name \"brush, cup, spoon, ball, sock, mug, knife, etc\" after mod cues and models from SLP.  8. Pt will perform rote speech tasks 3/5 opp w/ mod cues over 3 consecutive sessions.   *pt able to name letters A-Z given mod-max cues w/ visual stimuli on table top. Perseveration noted w/ letters \"w,y,z,u, x\"; pt able to verbalize DEWEY and RASHAAD after visual table top cues and max modeling from SLP. Improvement in spontaneous speech this session as well as ability to participate in tasks.    *Additional goals to be added/modified as necessary.      Thank you-  Alexus Yanes M.S., CCC-SLP      EDUCATION  The patient has been educated in the following areas:   Cognitive Impairment Communication Impairment.    SLP Recommendation and Plan    Continue tx per poc.  Improvement in spontaneous speech this session as well as ability to participate in tasks.    Time Calculation:     Time Calculation- SLP     Time Calculation- SLP     Row Name 03/10/20 1436 03/10/20 1435    SLP Start Time  1245  -Recorded by: AMARIS  0830  -Recorded by: AMARIS ROCK Stop Time  1330  -Recorded by: AMARIS  0915  -Recorded by: AMARIS ROCK Time Calculation (min)  45 min  -Recorded by: AMARIS  45 min  -Recorded by: CJ    SLP Non-Billable Time (min)  no documentation  15 min staffing  -Recorded by: AMARIS    SLP - Next Appointment  " 03/11/20  -Recorded by: AMARIS  03/10/20  -Recorded by: AMARIS            User Key     Initials Name Provider Type    Alexus Lamar MS CCC-SLP Speech and Language Pathologist          Therapy Charges for Today     Code Description Service Date Service Provider Modifiers Qty    76925213720 HC ST TREATMENT SPEECH 3 3/9/2020 Alexus Yanes, MS CCC-SLP GN 1    [id number]  ST TREATMENT SPEECH 6 3/10/2020 Alexus Yanes, MS ELKINS-SLP GN 1                     Alexus Yanes MS CCC-PRANAV  3/10/2020

## 2020-03-10 NOTE — THERAPY TREATMENT NOTE
Inpatient Rehabilitation - Occupational Therapy Treatment Note     Dominic     Patient Name: Connie Arango  : 1940  MRN: [id number]    Today's Date: 3/10/2020                 Admit Date: 2020      Visit Dx:  No diagnosis found.    Patient Active Problem List   Diagnosis   • ICH (intracerebral hemorrhage) (CMS/HCC)         Therapy Treatment    IRF Treatment Summary     Row Name 03/10/20 1520 03/10/20 1516          Evaluation/Treatment Time and Intent    Subjective Information  no complaints agreeable to therapy  -CJ  no complaints  -RG     Existing Precautions/Restrictions  fall;seizures  -CJ  fall;seizures  -RG     Document Type  therapy note (daily note)  -CJ  therapy note (daily note)  -RG     Mode of Treatment  occupational therapy  -CJ  physical therapy  -RG     Patient/Family Observations  --  Pt and nursing in agreement for skilled PT on this date.   -RG     Recorded by [CJ] Daylin Rebolledo OT [RG] Prakash Haque PTA     Row Name 03/10/20 1520 03/10/20 1516          Cognition/Psychosocial- PT/OT    Affect/Mental Status (Cognitive)  --  WFL  -RG     Follows Commands (Cognition)  verbal cues/prompting required;repetition of directions required;physical/tactile prompts required  -CJ  physical/tactile prompts required  -RG     Personal Safety Interventions  --  gait belt;nonskid shoes/slippers when out of bed;supervised activity  -RG     Cognitive Function (Cognitive)  safety deficit  -CJ  safety deficit;unable/difficult to assess  -RG     Attention Deficit (Cognitive)  requires cues/redirection to task  -CJ  requires cues/redirection to task  -RG     Safety Deficit (Cognitive)  safety precautions awareness;awareness of need for assistance;impulsivity  -CJ  --     Recorded by [CJ] Daylin Rebolledo OT [RG] Prakash Haque PTA     Row Name 03/10/20 1520             Communication Assessment/Intervention    Additional Documentation  -- aphasia  -CJ      Recorded by [CJ] Daylin Rebolledo, KHUSHBU      Row  Name 03/10/20 1516             Mobility    Extremity Weight-bearing Status  left lower extremity;right lower extremity  -RG      Left Lower Extremity (Weight-bearing Status)  full weight-bearing (FWB)  -RG      Right Lower Extremity (Weight-bearing Status)  full weight-bearing (FWB)  -RG      Recorded by [RG] Prakash Haque PTA      Row Name 03/10/20 1516             Bed Mobility Assessment/Treatment    Bed Mobility Assessment/Treatment  sit-supine  -RG      Sit-Supine [place] (Bed Mobility)  contact guard;verbal cues;nonverbal cues (demo/gesture)  -RG      Bed Mobility, Safety Issues  decreased use of arms for pushing/pulling;decreased use of legs for bridging/pushing  -RG      Assistive Device (Bed Mobility)  bed rails  -RG      Recorded by [RG] Prakash Haque PTA      Row Name 03/10/20 1516             Transfer Assessment/Treatment    Transfer Assessment/Treatment  sit-stand transfer;stand-sit transfer;stand pivot/stand step transfer  -RG      Recorded by [RG] Prakash Haque PTA      Row Name 03/10/20 1516             Bed-Chair Transfer    Bed-Chair [place] (Transfers)  verbal cues;nonverbal cues (demo/gesture);stand by assist  -RG      Assistive Device (Bed-Chair Transfers)  wheelchair  -RG      Recorded by [RG] Prakash Haque PTA      Row Name 03/10/20 1516             Chair-Bed Transfer    Chair-Bed [place] (Transfers)  verbal cues;nonverbal cues (demo/gesture);stand by assist  -RG      Assistive Device (Chair-Bed Transfers)  wheelchair  -RG      Recorded by [RG] Prakash Haque PTA      Row Name 03/10/20 1516             Sit-Stand Transfer    Sit-Stand [place] (Transfers)  verbal cues;nonverbal cues (demo/gesture);stand by assist  -RG      Assistive Device (Sit-Stand Transfers)  walker, front-wheeled  -RG      Recorded by [RG] Prakash Haque PTA      Row Name 03/10/20 1516             Stand-Sit Transfer    Stand-Sit [place] (Transfers)  verbal cues;nonverbal cues  (demo/gesture);stand by assist  -RG      Assistive Device (Stand-Sit Transfers)  walker, front-wheeled  -RG      Recorded by [RG] Prakash Haque PTA      Row Name 03/10/20 1516             Stand Pivot/Stand Step Transfer    Stand Pivot/Stand Step [place]  verbal cues;nonverbal cues (demo/gesture);stand by assist  -RG      Assistive Device (Stand Pivot Stand Step Transfer)  walker, front-wheeled  -RG      Recorded by [RG] Prakash Haque PTA      Row Name 03/10/20 1516             Gait/Stairs Assessment/Training    Gait/Stairs Assessment/Training  gait/ambulation independence;gait/ambulation assistive device;distance ambulated;gait pattern;gait deviations  -RG      [place] Level (Gait)  verbal cues;nonverbal cues (demo/gesture) SBA  -RG      Assistive Device (Gait)  walker, front-wheeled  -RG      Distance in Feet (Gait)  320' in am and 320' in pm  -RG      Pattern (Gait)  step-through  -RG      Deviations/Abnormal Patterns (Gait)  dmitry decreased;stride length decreased  -RG      Bilateral Gait Deviations  forward flexed posture  -RG      Recorded by [RG] Prakash Haque PTA      Row Name 03/10/20 1516             Safety Issues, Functional Mobility    Impairments Affecting Function (Mobility)  balance;coordination;endurance/activity tolerance;strength  -RG      Recorded by [RG] Prakash Haque PTA      Row Name 03/10/20 1520             Grooming Assessment/Treatment    Grooming [place] Level  set up;supervision;verbal cues  -CJ      Recorded by [CJ] Daylin Rebolledo OT      Row Name 03/10/20 1516             Vision Assessment/Intervention    Visual Impairment/Limitations  corrective lenses full time  -RG      Recorded by [RG] Prakash Haque PTA      Row Name 03/10/20 1516             Pain Scale: FACES Pre/Post-Treatment    Pain: FACES Scale, Pretreatment  0-->no hurt  -RG      Pain: FACES Scale, Post-Treatment  0-->no hurt  -RG      Recorded by [RG] Prakash Haque PTA      Row Name 03/10/20 1516              Balance    Balance  static sitting balance;static standing balance;dynamic sitting balance;dynamic standing balance;standing balance activity;dynamic balance activity  -RG      Recorded by [RG] Prakash Haque PTA      Row Name 03/10/20 1516             Dynamic Sitting Balance    Level of [place], Reaches Outside Midline (Sitting, Dynamic Balance)  supervision  -RG      Comment, Reaches Outside Midline (Sitting, Dynamic Balance)  balloon tap  -RG      Recorded by [RG] Prakash Haque PTA      Row Name 03/10/20 1516             Static Standing Balance    Level of [place] (Supported Standing, Static Balance)  minimal assist, 75% patient effort;contact guard assist  -RG      Time Able to Maintain Position (Supported Standing, Static Balance)  30 to 45 seconds  -RG      Assistive Device Utilized (Supported Standing, Static Balance)  parallel bars  -RG      Comment (Supported Standing, Static Balance)  static standing on foam pad  -RG      Recorded by [RG] Prakash Haque PTA      Row Name 03/10/20 1516             Dynamic Standing Balance    Level of [place], Reaches Outside Midline (Standing, Dynamic Balance)  contact guard assist  -RG      Comment, Resists Mild Perturbations (Sitting, Dynamic Balance)  LE exercises in //  -RG      Recorded by [RG] Prakash Haque PTA      Row Name 03/10/20 1516             Standing Balance Activity    Activities Performed (Standing, Balance Training)  standing on foam roll  -RG      Support Needed for Balance (Standing, Balance Training)  uses at least one upper extremity for support  -RG      Comment (Standing, Balance Training)  H-T, side to side walking on foam beam  -RG      Recorded by [RG] Prakash Haque PTA      Row Name 03/10/20 1520             Upper Extremity Seated Therapeutic Exercise    Exercise Type, Seated Upper Extremity (Therapeutic Exercise)  AROM (active range of motion) BUE ther ex/act, bilat coord ex, GMC, FMC, hand exerciser  -CJ       Expected Outcomes, Seated Upper Extremity (Therapeutic Exercise)  improve functional tolerance, self-care activity;improve performance, BADLs;improve performance, transfer skills;improve performance, gross motor coordination skills;improve performance, fine motor coordination skills  -CJ      Recorded by [CJ] Daylin Rebolledo OT      Row Name 03/10/20 1516             Lower Extremity Standing Therapeutic Exercise    Performed, Standing Lower Extremity (Therapeutic Exercise)  hip flexion/extension;hip abduction/adduction;mini-squats;heel raises  -RG      Device, Standing Lower Extremity (Therapeutic Exercise)  parallel bars  -RG      Exercise Type, Standing Lower Extremity (Therapeutic Exercise)  AROM (active range of motion)  -RG      Expected Outcomes, Standing Lower Extremity (Therapeutic Exercise)  improve functional tolerance, community activity;improve functional tolerance, household activity;improve functional tolerance, self-care activity;improve performance, gait skills;improve performance, transfer skills  -RG      Sets/Reps Detail, Standing Lower Extremity (Therapeutic Exercise)  20  -RG      Comment, Standing Lower Extremity (Therapeutic Exercise)  tactile cues for task initiation  -RG      Recorded by [RG] Prakash Haque PTA      Row Name 03/10/20 1516             Lower Extremity Seated Therapeutic Exercise    Performed, Seated Lower Extremity (Therapeutic Exercise)  hip flexion/extension;hip abduction/adduction;hip external/internal rotation;knee flexion/extension;LAQ (long arc quad), knee extension;ankle dorsiflexion/plantarflexion  -RG      Device, Seated Lower Extremity (Therapeutic Exercise)  elastic bands/tubing;free weights, cuff;small ball  -RG      Exercise Type, Seated Lower Extremity (Therapeutic Exercise)  AROM (active range of motion)  -RG      Expected Outcomes, Seated Lower Extremity (Therapeutic Exercise)  improve functional tolerance, community activity;improve functional tolerance,  household activity;improve functional tolerance, self-care activity;improve performance, gait skills;improve performance, transfer skills  -RG      Sets/Reps Detail, Seated Lower Extremity (Therapeutic Exercise)  20  -RG      Comment, Seated Lower Extremity (Therapeutic Exercise)  Cues for task initiation  -RG      Recorded by [RG] Prakash Haque PTA      Row Name 03/10/20 1520 03/10/20 1516          Positioning and Restraints    Pre-Treatment Position  --  sitting in chair/recliner  -RG     Post Treatment Position  wheelchair  -  bed  -RG     In Bed  --  notified nsg;supine;call light within reach;encouraged to call for assist;side rails up x3;legs elevated  -RG     In Wheelchair  sitting;call light within reach;encouraged to call for assist;notified nsg  -  --     Recorded by [CJ] Daylin Rebolledo OT [RG] Prakash Haque PTA     Row Name 03/10/20 1516             Daily Summary of Progress (PT)    Impairments Continuing to Limit Function: Physical Therapy  strength decreased;impaired balance;impaired communication;impaired functional activity tolerance  -RG      Recorded by [RG] Prakash Haque PTA        User Key  (r) = Recorded By, (t) = Taken By, (c) = Cosigned By    Initials Name Effective Dates     Daylin Rebolledo OT 04/03/18 -     Prakash Broussard PTA 10/31/19 -                  OT Recommendation and Plan    Anticipated Equipment Needs At Discharge (OT Eval): (TBD)  Planned Therapy Interventions (OT Eval): activity tolerance training, BADL retraining, neuromuscular control/coordination retraining, occupation/activity based interventions, patient/caregiver education/training, ROM/therapeutic exercise, strengthening exercise, transfer/mobility retraining            OT IRF GOALS     Row Name 03/05/20 1700 03/05/20 1659 02/27/20 1523       Bathing Goal 1 (OT-IRF)    [place] Level (Bathing Goal 1, OT-IRF)  --  --  moderate assist (50-74% patient effort)  -    Time Frame (Bathing Goal 1, OT-IRF)   --  --  long term goal (LTG);by discharge  -CJ       LB Dressing Goal 1 (OT-IRF)    [place] (LB Dressing Goal 1, OT-IRF)  --  --  moderate assist (50-74% patient effort)  -CJ    Time Frame (LB Dressing Goal 1, OT-IRF)  --  --  short term goal (STG)  -CJ    Progress/Outcomes (LB Dressing Goal 1, OT-IRF)  --  goal ongoing  -AB  --       LB Dressing Goal 2 (OT-IRF)    [place] (LB Dressing Goal 2, OT-IRF)  --  --  minimum assist (75% or more patient effort)  -CJ    Time Frame (LB Dressing Goal 2, OT-IRF)  --  --  long term goal (LTG);by discharge  -CJ       Toileting Goal 1 (OT-IRF)    [place] Level (Toileting Goal 1, OT-IRF)  minimum assist (75% or more patient effort);contact guard assist  -AB  --  maximum assist (25-49% patient effort)  -CJ    Time Frame (Toileting Goal 1, OT-IRF)  short term goal (STG);5 - 7 days  -AB  --  short term goal (STG)  -CJ    Progress/Outcomes (Toileting Goal 1, OT-IRF)  --  goal met  -AB  --       Toileting Goal 2 (OT-IRF)    [place] Level (Toileting Goal 2, OT-IRF)  --  --  moderate assist (50-74% patient effort)  -CJ    Time Frame (Toileting Goal 2, OT-IRF)  --  --  long term goal (LTG);by discharge  -      User Key  (r) = Recorded By, (t) = Taken By, (c) = Cosigned By    Initials Name Provider Type    Suha Vincent, OT Occupational Therapist    Daylin Beckman, OT Occupational Therapist                     Time Calculation:     Time Calculation- OT     Row Name 03/10/20 1529             Time Calculation- OT    OT Start Time  1045  -CJ      OT Stop Time  1155  -      OT Time Calculation (min)  70 min  -      Total Timed Code Minutes- OT  70 minute(s)  -      OT Non-Billable Time (min)  15 min  -        User Key  (r) = Recorded By, (t) = Taken By, (c) = Cosigned By    Initials Name Provider Type    Daylin Beckman, KHUSHBU Occupational Therapist          Therapy Charges for Today     Code Description Service Date Service Provider Modifiers  Qty    80415101402 HC OT SELF CARE/MGMT/TRAIN EA 15 MIN 3/9/2020 Daylin Rebolledo, OT GO 2    21770927296 HC OT THER PROC EA 15 MIN 3/9/2020 Daylin Rebolledo, OT GO 2    82206412912 HC OT THERAPEUTIC ACT EA 15 MIN 3/9/2020 Daylin Rebolledo, OT GO 3    53830495633 HC OT SELF CARE/MGMT/TRAIN EA 15 MIN 3/10/2020 Daylin Rebolledo OT GO 1    30779226354 HC OT THERAPEUTIC ACT EA 15 MIN 3/10/2020 Daylin Rebolledo OT GO 3    57595678450 HC OT THER PROC EA 15 MIN 3/10/2020 Daylin Rebolledo, OT GO 1                   Daylin Rebolledo OT  3/10/2020

## 2020-03-10 NOTE — SIGNIFICANT NOTE
03/10/20 3700   Plan   Plan Received call from pt's brother Rohan.  Informed him [facility] can accept pt if approved by insurance.  Explained insurance can take 24-72 hours to make a decision.  Rohan says pt's son's phone is not working.  Brother to be notified when NH gets a decision from insurance.  Informed brother how pt is doing in therapy.     Patient/Family in Agreement with Plan yes

## 2020-03-10 NOTE — SIGNIFICANT NOTE
03/10/20 0935   Plan   Plan Team conference held today.  Pt is pending SNF placement.  Spoke to Ashley at [facility] [phone] who state semi-private room is available and they can accept pt if approved by insurance.  NH will request PA from insurance and contact SS when they get a decision.

## 2020-03-10 NOTE — PROGRESS NOTES
Occupational Therapy:    Physical Therapy:    Speech Language Pathology: Individual: 90 minutes.    Signed by: Alexus Yanes Speech therapist

## 2020-03-11 PROCEDURE — 97530 THERAPEUTIC ACTIVITIES: CPT

## 2020-03-11 PROCEDURE — 97110 THERAPEUTIC EXERCISES: CPT

## 2020-03-11 PROCEDURE — 97535 SELF CARE MNGMENT TRAINING: CPT

## 2020-03-11 PROCEDURE — 97116 GAIT TRAINING THERAPY: CPT

## 2020-03-11 PROCEDURE — 92507 TX SP LANG VOICE COMM INDIV: CPT | Performed by: SPEECH-LANGUAGE PATHOLOGIST

## 2020-03-11 RX ADMIN — FUROSEMIDE 20 MG: 20 TABLET ORAL at 08:22

## 2020-03-11 RX ADMIN — CARVEDILOL 12.5 MG: 25 TABLET, FILM COATED ORAL at 08:22

## 2020-03-11 RX ADMIN — CARVEDILOL 12.5 MG: 25 TABLET, FILM COATED ORAL at 17:09

## 2020-03-11 RX ADMIN — ATORVASTATIN CALCIUM 40 MG: 40 TABLET, FILM COATED ORAL at 21:52

## 2020-03-11 RX ADMIN — IMIPRAMINE HYDROCHLORIDE 10 MG: 10 TABLET ORAL at 21:52

## 2020-03-11 RX ADMIN — MAGNESIUM GLUCONATE 500 MG ORAL TABLET 400 MG: 500 TABLET ORAL at 08:22

## 2020-03-11 RX ADMIN — POTASSIUM CHLORIDE 10 MEQ: 750 TABLET, FILM COATED, EXTENDED RELEASE ORAL at 08:22

## 2020-03-11 RX ADMIN — FAMOTIDINE 40 MG: 20 TABLET, FILM COATED ORAL at 08:22

## 2020-03-11 RX ADMIN — ASPIRIN 81 MG: 81 TABLET, CHEWABLE ORAL at 08:22

## 2020-03-11 NOTE — SIGNIFICANT NOTE
03/11/20 0933   Plan   Plan Received call from Zeina at [facility] who says insurance is requested updated therapy notes, therapy evaluations upon admission to rehab, and medication orders post discharge.  Informed [facility] MD will order medication needed at discharge. Faxed today's MD progress note, PT/OT/SLP notes on 3-10-20, PT/OT/SLP evaluation notes, and current medication list to [phone].   also received call from pt's niece Brittanie Nguyen [phone] who requests status of SNF placement.  Informed her [facility] can accept pt if insurance approves admission and insurance requested updated notes today.

## 2020-03-11 NOTE — PROGRESS NOTES
PROGRESS NOTE         Patient Identification:  Name:  Connie Arango  Age:  79 y.o.  Sex:  female  :  1940  MRN:  [id number]  Visit Number:  [id number]  Primary Care Provider:  Luis Enrique Prieto MD         LOS: 15 days       ----------------------------------------------------------------------------------------------------------------------  Subjective       Chief Complaints:    Intracranial hemorrhage   Encephalopathy   debility      Interval History:      Patient participated with occupational therapy and physical therapy on 3/11/20. Patient's mobility continues to be full weight-bearing at present. Bed mobility is at contact guard with verbal and nonverbal cues with decreased use of arms for pushing/pulling and decreased use of legs for bridging/pushing but uses bed rails. Bed-chair transfer is at stand by assist with verbal and nonverbal cues and using a wheelchair. Chair-bed transfer is at stand by assist with verbal and nonverbal cues and using a wheelchair. Toilet transfer training is at stand by assist with verbal cues using the grab bars and safety frame. Patient ambulated 320 feet x2 at stand by assist using a front-wheeled walker with verbal and nonverbal cues.     Vitals are stable and labs are within normal limit.  Patient has no complaints this morning without any chest pain shortness of breath nausea vomiting or diarrhea.    Patient participated with occupational therapy, physical therapy, and speech therapy on 3/10/20. Patient's mobility is at full weight-bearing on both sides. Bed mobility is at contact guard with verbal and nonverbal cues with decreased use of arms for pushing/pulling and decreased use of legs for bridging/pushing but using the bed rails. Sit-stand transfer is at stand by assist with verbal and nonverbal cues and using the front-wheeled walker. Patient ambulated for 320 feet in the AM and 320 feet in the PM at stand by assist with verbal and nonverbal cues  and using a front-wheeled walker.     Patient participated with speech therapy, physical therapy, and occupational therapy on 3/9/20. Her mobility is at full weight-bearing on both sides. Speech therapy updated the plan of care to refect the following notations: Patient actively participated in formal speech language treatment on this date and she continues to make progress towards her goals.    Problem: Communication Impairment (IRF) (Adult)  Goal: Optimal/Safe Level of Communication [place]  Outcome: Ongoing (interventions implemented as appropriate)  Optimal/Safe Level of Communication [place]: demonstrating adequate progress  Goal: Optimal Quality of Life in Relation to Communication  Outcome: Ongoing (interventions implemented as appropriate)      Review of Systems:    Constitutional: no fever, chills and night sweats.  Eyes: no eye drainage, itching or redness.  HEENT: no mouth sores, dysphagia or nose bleed.  Respiratory: no for shortness of breath, cough or production of sputum.  Cardiovascular: no chest pain, no palpitations, no orthopnea.  Gastrointestinal: no nausea, vomiting or diarrhea. No abdominal pain, hematemesis or rectal bleeding.  Genitourinary: no dysuria or polyuria.  Hematologic/lymphatic: no lymph node abnormalities, no easy bruising or easy bleeding.  Musculoskeletal: no muscle or joint pain.  Skin: No rash and no itching.  Neurological: no loss of consciousness, no seizure, no headache.  Behavioral/Psych: no depression or suicidal ideation.  Endocrine: no hot flashes.  Immunologic: negative.  ----------------------------------------------------------------------------------------------------------------------      Objective       Current Hospital Meds:    aspirin 81 mg Oral Daily   atorvastatin 40 mg Oral Nightly   carvedilol 12.5 mg Oral BID With Meals   famotidine 40 mg Oral Daily   furosemide 20 mg Oral Daily   imipramine 10 mg Oral Nightly   magnesium oxide 400 mg Oral Daily      potassium chloride 10 mEq Oral Daily        ----------------------------------------------------------------------------------------------------------------------    Vital Signs:  Temp:  [99.3 °F (37.4 °C)] 99.3 °F (37.4 °C)  Heart Rate:  [81] 81  Resp:  [18] 18  BP: (131)/(61) 131/61  No data found.  SpO2 Percentage    03/10/20 0705 03/10/20 1905 03/11/20 1907   SpO2: 95% 96% 94%     SpO2:  [94 %] 94 %  on   ;   Device (Oxygen Therapy): room air    Body mass index is 26.45 kg/m².  Wt Readings from Last 3 Encounters:   02/26/20 72 kg (158 lb 11.7 oz)        Intake/Output Summary (Last 24 hours) at 3/12/2020 0940  Last data filed at 3/12/2020 0900  Gross per 24 hour   Intake 1440 ml   Output --   Net 1440 ml     Diet Pureed; Thin  ----------------------------------------------------------------------------------------------------------------------      Physical Exam:     General Appearance: alert and pleasant.  Very hard of hearing.    Head: normocephalic, without obvious abnormality and atraumatic     Eyes: lids and lashes normal, conjunctivae and sclerae normal, no icterus, no pallor, corneas clear and PERRLA     Ears: ears appear intact with no abnormalities noted     Nose: nares normal, septum midline, mucosa normal and no drainage                Throat: no oral lesions, no thrush, oral mucosa moist and oopharynx normal     Neck: no adenopathy, supple, trachea midline, no thyromegaly, no carotid bruit and no JVD     Back: no kyphosis present, no scoliosis present, no skin lesions, erythema, or scars, no tenderness to percussion or palpation and range of motion normal     Lungs: clear to auscultation, respirations regular, respirations even and  respirations unlabored. No accessory muscle use.      Heart:: regular rhythm & normal rate, normal S1, S2, no murmur, no gallop, no rub and no click.  Chest wall with no abnormalities observed. PMI nondisplaced     Abdomen: normal bowel sounds in all quadrants, no  masses, no hepatomegaly, no splenomegaly, soft non-tender, no guarding and no rebound tenderness     Extremities: no edema, no cyanosis, no redness, no tenderness, no clubbing     Musculoskeletal: joints with no effusion nor erythema nor warmth.  Pedal pulses palpable and equal bilaterally     Skin: no bleeding, bruising or rash and no lesions noted     Lymph Nodes: no palpable adenopathy     Neurologic: Alert and awake.  Mild aphasia.              Sensory intact in BLE and BUE.              Motor strength Generalized weakness  ----------------------------------------------------------------------------------------------------------------------            Results from last 7 days   Lab Units 03/06/20  0152   WBC 10*3/mm3 4.80   HEMOGLOBIN g/dL 11.2*   HEMATOCRIT % 35.6   MCV fL 89.0   MCHC g/dL 31.5   PLATELETS 10*3/mm3 193     Results from last 7 days   Lab Units 03/06/20  0152   SODIUM mmol/L 142   POTASSIUM mmol/L 3.9   MAGNESIUM mg/dL 2.3   CHLORIDE mmol/L 106   CO2 mmol/L 23.7   BUN mg/dL 11   CREATININE mg/dL 0.74   EGFR IF NONAFRICN AM mL/min/1.73 76   CALCIUM mg/dL 9.2   GLUCOSE mg/dL 101*   ALBUMIN g/dL 3.16*   BILIRUBIN mg/dL 0.4   ALK PHOS U/L 73   AST (SGOT) U/L 34*   ALT (SGPT) U/L 26   Estimated Creatinine Clearance: 55.7 mL/min (by C-G formula based on SCr of 0.74 mg/dL).  No results found for: AMMONIA    No results found for: HGBA1C, POCGLU  No results found for: HGBA1C  No results found for: TSH, FREET4    No results found for: BLOODCX  No results found for: URINECX  No results found for: WOUNDCX  No results found for: STOOLCX  No results found for: RESPCX  Pain Management Panel     There is no flowsheet data to display.            ----------------------------------------------------------------------------------------------------------------------  Imaging Results (Last 24 Hours)     ** No results found for the last 24 hours. **           ----------------------------------------------------------------------------------------------------------------------    Assessment/Plan       Assessment/Plan     ASSESSMENT:    Multifocal CVA/left parietotemporal ICH   Small acute corical ischemic infarcts in the left parietal Lobe and right parieto-occipital region  Left parietotemporal hemorrhage  Encephalopathy  Acute hypoxia respiratory failure  HTN  Hypotension  Small bilateral pleural effusions  Dysphagia  Global aphasia     PLAN:    Patient participated with occupational therapy and physical therapy on 3/11/20. Patient's mobility continues to be full weight-bearing at present. Bed mobility is at contact guard with verbal and nonverbal cues with decreased use of arms for pushing/pulling and decreased use of legs for bridging/pushing but uses bed rails. Bed-chair transfer is at stand by assist with verbal and nonverbal cues and using a wheelchair. Chair-bed transfer is at stand by assist with verbal and nonverbal cues and using a wheelchair. Toilet transfer training is at stand by assist with verbal cues using the grab bars and safety frame. Patient ambulated 320 feet x2 at stand by assist using a front-wheeled walker with verbal and nonverbal cues.     Vitals are stable and labs are within normal limit.  Patient has no complaints this morning without any chest pain shortness of breath nausea vomiting or diarrhea.    Patient participated with occupational therapy, physical therapy, and speech therapy on 3/10/20. Patient's mobility is at full weight-bearing on both sides. Bed mobility is at contact guard with verbal and nonverbal cues with decreased use of arms for pushing/pulling and decreased use of legs for bridging/pushing but using the bed rails. Sit-stand transfer is at stand by assist with verbal and nonverbal cues and using the front-wheeled walker. Patient ambulated for 320 feet in the AM and 320 feet in the PM at stand by assist with verbal and  nonverbal cues and using a front-wheeled walker.     Patient participated with speech therapy, physical therapy, and occupational therapy on 3/9/20. Her mobility is at full weight-bearing on both sides. Speech therapy updated the plan of care to refect the following notations: Patient actively participated in formal speech language treatment on this date and she continues to make progress towards her goals.    Problem: Communication Impairment (IRF) (Adult)  Goal: Optimal/Safe Level of Communication [place]  Outcome: Ongoing (interventions implemented as appropriate)  Optimal/Safe Level of Communication [place]: demonstrating adequate progress  Goal: Optimal Quality of Life in Relation to Communication  Outcome: Ongoing (interventions implemented as appropriate)      Code Status:   Code Status and Medical Interventions:   Ordered at: 02/26/20 1948     Code Status:    CPR     Medical Interventions (Level of Support Prior to Arrest):    Full         Sean Coppola MD  03/12/20  9:40 AM

## 2020-03-11 NOTE — THERAPY TREATMENT NOTE
Inpatient Rehabilitation - Occupational Therapy Treatment Note     [facility]     Patient Name: Connie Arango  : 1940  MRN: [id number]    Today's Date: 3/11/2020                 Admit Date: 2020      Visit Dx:  No diagnosis found.    Patient Active Problem List   Diagnosis   • ICH (intracerebral hemorrhage) (CMS/HCC)         Therapy Treatment    IRF Treatment Summary     Row Name 20 142             Evaluation/Treatment Time and Intent    Subjective Information  no complaints agreeable to therapy  -CJ      Existing Precautions/Restrictions  fall;seizures  -      Document Type  therapy note (daily note)  -      Mode of Treatment  occupational therapy  -CJ      Recorded by [CJ] Daylin Rebolledo OT      Row Name 20 142             Cognition/Psychosocial- PT/OT    Follows Commands (Cognition)  verbal cues/prompting required;physical/tactile prompts required;repetition of directions required  -CJ      Cognitive Function (Cognitive)  safety deficit  -CJ      Attention Deficit (Cognitive)  requires cues/redirection to task  -CJ      Safety Deficit (Cognitive)  safety precautions awareness;awareness of need for assistance;impulsivity  -CJ      Recorded by [CJ] Daylin Rebolledo OT      Row Name 20 142             Toilet Transfer    [place] Level (Toilet Transfer)  stand by assist;verbal cues  -CJ      Assistive Device (Toilet Transfer)  grab bars/safety frame  -CJ      Recorded by [CJ] Daylin Rebolledo OT      Row Name 20 142             Bathing Assessment/Treatment    Bathing [place] Level  contact guard assist;verbal cues;nonverbal cues (demo/gesture)  -CJ      Recorded by [CJ] Daylin Rebolledo OT      Row Name 20 142             Upper Body Dressing Assessment/Treatment    Upper Body Dressing Task  set up assistance;supervision;verbal cues  -CJ      Recorded by [CJ] Daylin Rebolledo OT      Row Name 20 142             Lower Body Dressing  Assessment/Treatment    Lower Body Dressing [place] Level  contact guard assist;verbal cues;nonverbal cues (demo/gesture)  -CJ      Recorded by [CJ] Daylin Rebolledo, KHUSHBU      Row Name 03/11/20 1421             Grooming Assessment/Treatment    Grooming [place] Level  set up;supervision  -CJ      Recorded by [CJ] Daylin Rebolledo, OT      Row Name 03/11/20 1421             Toileting Assessment/Treatment    Toileting [place] Level  contact guard assist;verbal cues  -CJ      Assistive Device Use (Toileting)  grab bar/safety frame  -CJ      Recorded by [CJ] Daylin Rebolledo, OT      Row Name 03/11/20 1421             Upper Extremity Seated Therapeutic Exercise    Device, Seated Upper Extremity (Therapeutic Exercise)  -- hand exerciser  -CJ      Exercise Type, Seated Upper Extremity (Therapeutic Exercise)  AROM (active range of motion) BUE ther ex/act, bilat coord ex, GMC/FMC, fxl activity tolerance  -CJ      Expected Outcomes, Seated Upper Extremity (Therapeutic Exercise)  improve functional tolerance, self-care activity;improve performance, BADLs;improve performance, transfer skills;improve performance, gross motor coordination skills;improve performance, fine motor coordination skills  -CJ      Recorded by [CJ] Daylin Rebolledo, OT      Row Name 03/11/20 1421             Positioning and Restraints    Post Treatment Position  wheelchair  -CJ      In Wheelchair  with PT post both sessions  -CJ      Recorded by [CJ] Daylin Rebolledo OT        User Key  (r) = Recorded By, (t) = Taken By, (c) = Cosigned By    Initials Name Effective Dates     Daylin Rebolledo OT 04/03/18 -                  OT Recommendation and Plan    Anticipated Equipment Needs At Discharge (OT Eval): (TBD)  Planned Therapy Interventions (OT Eval): activity tolerance training, BADL retraining, neuromuscular control/coordination retraining, occupation/activity based interventions, patient/caregiver education/training, ROM/therapeutic  exercise, strengthening exercise, transfer/mobility retraining            OT IRF GOALS     Row Name 03/05/20 1700 03/05/20 1659 02/27/20 1523       Bathing Goal 1 (OT-IRF)    [place] Level (Bathing Goal 1, OT-IRF)  --  --  moderate assist (50-74% patient effort)  -    Time Frame (Bathing Goal 1, OT-IRF)  --  --  long term goal (LTG);by discharge  -       LB Dressing Goal 1 (OT-IRF)    [place] (LB Dressing Goal 1, OT-IRF)  --  --  moderate assist (50-74% patient effort)  -CJ    Time Frame (LB Dressing Goal 1, OT-IRF)  --  --  short term goal (STG)  -CJ    Progress/Outcomes (LB Dressing Goal 1, OT-IRF)  --  goal ongoing  -AB  --       LB Dressing Goal 2 (OT-IRF)    [place] (LB Dressing Goal 2, OT-IRF)  --  --  minimum assist (75% or more patient effort)  -CJ    Time Frame (LB Dressing Goal 2, OT-IRF)  --  --  long term goal (LTG);by discharge  -       Toileting Goal 1 (OT-IRF)    [place] Level (Toileting Goal 1, OT-IRF)  minimum assist (75% or more patient effort);contact guard assist  -AB  --  maximum assist (25-49% patient effort)  -    Time Frame (Toileting Goal 1, OT-IRF)  short term goal (STG);5 - 7 days  -AB  --  short term goal (STG)  -CJ    Progress/Outcomes (Toileting Goal 1, OT-IRF)  --  goal met  -AB  --       Toileting Goal 2 (OT-IRF)    [place] Level (Toileting Goal 2, OT-IRF)  --  --  moderate assist (50-74% patient effort)  -    Time Frame (Toileting Goal 2, OT-IRF)  --  --  long term goal (LTG);by discharge  -      User Key  (r) = Recorded By, (t) = Taken By, (c) = Cosigned By    Initials Name Provider Type    Suha Vincent, OT Occupational Therapist    Daylin Beckman OT Occupational Therapist                     Time Calculation:     Time Calculation- OT     Row Name 03/11/20 1430 03/11/20 0805          Time Calculation- OT    OT Start Time  1245  -  0805  -     OT Stop Time  1330  -  0915  -     OT Time Calculation (min)  45 min  -  70  min  -     Total Timed Code Minutes- OT  45 minute(s)  -CJ  70 minute(s)  -     OT Non-Billable Time (min)  --  15 min  -       User Key  (r) = Recorded By, (t) = Taken By, (c) = Cosigned By    Initials Name Provider Type    Daylin Beckman OT Occupational Therapist          Therapy Charges for Today     Code Description Service Date Service Provider Modifiers Qty    94100272738 HC OT SELF CARE/MGMT/TRAIN EA 15 MIN 3/10/2020 Daylin Rebolledo OT GO 1    91489878931 HC OT THERAPEUTIC ACT EA 15 MIN 3/10/2020 Daylin Rebolledo OT GO 3    62298072938 HC OT THER PROC EA 15 MIN 3/10/2020 Daylin Rebolledo OT GO 1    48112245678 HC OT SELF CARE/MGMT/TRAIN EA 15 MIN 3/11/2020 Daylin Rebolledo, OT GO 3    91487223439 HC OT THER PROC EA 15 MIN 3/11/2020 Daylin Rebolledo, OT GO 2    83977676862 HC OT THERAPEUTIC ACT EA 15 MIN 3/11/2020 Daylin Rebolledo, OT GO 3                   Daylin Rebolledo OT  3/11/2020

## 2020-03-11 NOTE — SIGNIFICANT NOTE
03/11/20 1550   Plan   Plan [facility] states pt's [facility] called and their MD is requesting peer to peer with Rehab MD by 3-12-20 at 12:00 pm.  The number is [phone] option #5.  Provided information to MD.

## 2020-03-11 NOTE — SIGNIFICANT NOTE
03/11/20 8673   Plan   Plan Spoke to pt and brother about insurance MD requesting peer to peer review with Rehab MD by tomorrow at 12:00 pm.  Brother says there is no other family member who can provide 24 hour assistance except pt's son and they have concerns about him being her caregiver at this time.  Will follow.

## 2020-03-11 NOTE — PLAN OF CARE
Pt actively participated in s/l/cog tx this pm. She is noted w/ improvement in spontaneous speech however given severity level of aphasia is felt to be unsafe to return home w/o supervision. Her communication impairment negatively impacts pt's ability to functionally communicate in an emergency or to functionally complete adls w/o assistance. She is felt to most benefit from continued intense ST tx to address aphasia.  Problem: Communication Impairment (IRF) (Adult)  Goal: Optimal/Safe Level of Communication [place]  Outcome: Ongoing (interventions implemented as appropriate)  Flowsheets (Taken 2/29/2020 1409 by Patience Yanes MA,CCC-SLP)  Optimal/Safe Level of Communication [place]: demonstrating adequate progress  Goal: Optimal Quality of Life in Relation to Communication  Outcome: Ongoing (interventions implemented as appropriate)  Flowsheets (Taken 2/29/2020 1409 by Patience Yanes MA,CCC-SLP)  Optimal Quality of Life in Relation to Communication: demonstrating adequate progress     Problem: Patient Care Overview  Goal: Plan of Care Review  Outcome: Ongoing (interventions implemented as appropriate)  Flowsheets (Taken 3/11/2020 1511 by Linsey Doan RN)  Progress, Functional Goals: demonstrating adequate progress  Plan of Care Reviewed With: patient  IRF Plan of Care Review: progress ongoing, continue

## 2020-03-11 NOTE — PROGRESS NOTES
Occupational Therapy: Individual: 115 minutes.    Physical Therapy:    Speech Language Pathology:    Signed by: Daylin Rebolledo, Occupational Therapist

## 2020-03-11 NOTE — THERAPY TREATMENT NOTE
Inpatient Rehabilitation - Physical Therapy Treatment Note   [place]     Patient Name: Connie Arango  : 1940  MRN: [id number]    Today's Date: 3/11/2020                 Admit Date: 2020      Visit Dx:    No diagnosis found.    Patient Active Problem List   Diagnosis   • ICH (intracerebral hemorrhage) (CMS/HCC)       Therapy Treatment    IRF Treatment Summary     Row Name 20 1507 20 142          Evaluation/Treatment Time and Intent    Subjective Information  no complaints  -RG  no complaints agreeable to therapy  -CJ     Existing Precautions/Restrictions  fall;seizures  -RG  fall;seizures  -CJ     Document Type  therapy note (daily note)  -RG  therapy note (daily note)  -CJ     Mode of Treatment  physical therapy  -RG  occupational therapy  -CJ     Patient/Family Observations  Pt and nursing in agreement for skilled PT on this date.   -RG  --     Recorded by [RG] Prakash Haque PTA [CJ] Daylin Rebolledo OT     Row Name 20 1507 20 142          Cognition/Psychosocial- PT/OT    Affect/Mental Status (Cognitive)  WFL  -RG  --     Follows Commands (Cognition)  physical/tactile prompts required  -RG  verbal cues/prompting required;physical/tactile prompts required;repetition of directions required  -CJ     Personal Safety Interventions  gait belt;nonskid shoes/slippers when out of bed;supervised activity  -RG  --     Cognitive Function (Cognitive)  safety deficit;unable/difficult to assess  -RG  safety deficit  -CJ     Attention Deficit (Cognitive)  requires cues/redirection to task  -RG  requires cues/redirection to task  -CJ     Safety Deficit (Cognitive)  --  safety precautions awareness;awareness of need for assistance;impulsivity  -CJ     Recorded by [RG] Prakash Haque PTA [CJ] Daylin Rebolledo OT     Row Name 20 1507             Mobility    Extremity Weight-bearing Status  left lower extremity;right lower extremity  -RG      Left Lower Extremity (Weight-bearing  Status)  full weight-bearing (FWB)  -RG      Right Lower Extremity (Weight-bearing Status)  full weight-bearing (FWB)  -RG      Recorded by [RG] Prakash Haque PTA      Row Name 03/11/20 1507             Bed Mobility Assessment/Treatment    Bed Mobility Assessment/Treatment  sit-supine  -RG      Sit-Supine Gates (Bed Mobility)  contact guard;verbal cues;nonverbal cues (demo/gesture)  -RG      Bed Mobility, Safety Issues  decreased use of arms for pushing/pulling;decreased use of legs for bridging/pushing  -RG      Assistive Device (Bed Mobility)  bed rails  -RG      Recorded by [RG] Prakash Haque PTA      Row Name 03/11/20 1507             Transfer Assessment/Treatment    Transfer Assessment/Treatment  sit-stand transfer;stand-sit transfer;stand pivot/stand step transfer  -RG      Recorded by [RG] Prakash Haque PTA      Row Name 03/11/20 1507             Bed-Chair Transfer    Bed-Chair Gates (Transfers)  verbal cues;nonverbal cues (demo/gesture);stand by assist  -RG      Assistive Device (Bed-Chair Transfers)  wheelchair  -RG      Recorded by [RG] Prakash Haque PTA      Row Name 03/11/20 1507             Chair-Bed Transfer    Chair-Bed Gates (Transfers)  verbal cues;nonverbal cues (demo/gesture);stand by assist  -RG      Assistive Device (Chair-Bed Transfers)  wheelchair  -RG      Recorded by [RG] Parkash Haque PTA      Row Name 03/11/20 1507             Sit-Stand Transfer    Sit-Stand [place] (Transfers)  verbal cues;nonverbal cues (demo/gesture);stand by assist  -RG      Assistive Device (Sit-Stand Transfers)  walker, front-wheeled  -RG      Recorded by [RG] Prakash Haque PTA      Row Name 03/11/20 1507             Stand-Sit Transfer    Stand-Sit [place] (Transfers)  verbal cues;nonverbal cues (demo/gesture);stand by assist  -RG      Assistive Device (Stand-Sit Transfers)  walker, front-wheeled  -RG      Recorded by [RG] Prakash Haque PTA      Row Name 03/11/20 1507              Stand Pivot/Stand Step Transfer    Stand Pivot/Stand Step [place]  verbal cues;nonverbal cues (demo/gesture);stand by assist  -RG      Assistive Device (Stand Pivot Stand Step Transfer)  walker, front-wheeled  -RG      Recorded by [RG] Prakash Haque PTA      Row Name 03/11/20 1421             Toilet Transfer    [place] Level (Toilet Transfer)  stand by assist;verbal cues  -CJ      Assistive Device (Toilet Transfer)  grab bars/safety frame  -CJ      Recorded by [CJ] Daylin Rebolledo OT      Row Name 03/11/20 1507             Gait/Stairs Assessment/Training    Gait/Stairs Assessment/Training  gait/ambulation independence;gait/ambulation assistive device;distance ambulated;gait pattern;gait deviations  -RG      [place] Level (Gait)  verbal cues;nonverbal cues (demo/gesture) SBA  -RG      Assistive Device (Gait)  walker, front-wheeled  -RG      Distance in Feet (Gait)  320', 320'  -RG      Pattern (Gait)  step-through  -RG      Deviations/Abnormal Patterns (Gait)  dmitry decreased;stride length decreased  -RG      Bilateral Gait Deviations  forward flexed posture  -RG      Recorded by [RG] Prakash Haque PTA      Row Name 03/11/20 1507             Safety Issues, Functional Mobility    Impairments Affecting Function (Mobility)  balance;coordination;endurance/activity tolerance;strength  -RG      Recorded by [RG] Prakash Haque PTA      Row Name 03/11/20 1421             Bathing Assessment/Treatment    Bathing [place] Level  contact guard assist;verbal cues;nonverbal cues (demo/gesture)  -CJ      Recorded by [CJ] Daylin Rebolledo OT      Row Name 03/11/20 1421             Upper Body Dressing Assessment/Treatment    Upper Body Dressing Task  set up assistance;supervision;verbal cues  -CJ      Recorded by [CJ] Daylin Rebolledo OT      Row Name 03/11/20 1421             Lower Body Dressing Assessment/Treatment    Lower Body Dressing [place] Level  contact guard assist;verbal  cues;nonverbal cues (demo/gesture)  -CJ      Recorded by [CJ] Daylin Rebolledo, OT      Row Name 03/11/20 1421             Grooming Assessment/Treatment    Grooming [place] Level  set up;supervision  -CJ      Recorded by [CJ] Daylin Rebolledo, OT      Row Name 03/11/20 1421             Toileting Assessment/Treatment    Toileting [place] Level  contact guard assist;verbal cues  -CJ      Assistive Device Use (Toileting)  grab bar/safety frame  -CJ      Recorded by [CJ] Daylin Rebolledo, OT      Row Name 03/11/20 1507             Vision Assessment/Intervention    Visual Impairment/Limitations  corrective lenses full time  -RG      Recorded by [RG] Prakash Haque, JD      Row Name 03/11/20 1507             Pain Scale: FACES Pre/Post-Treatment    Pain: FACES Scale, Pretreatment  0-->no hurt  -RG      Pain: FACES Scale, Post-Treatment  0-->no hurt  -RG      Recorded by [RG] Prakash Haque, JD      Row Name 03/11/20 1507             Balance    Balance  static sitting balance;static standing balance;dynamic sitting balance;dynamic standing balance;standing balance activity;dynamic balance activity  -RG      Recorded by [RG] Prakash Haque, JD      Row Name 03/11/20 1507             Static Sitting Balance    Level of [place] (Unsupported Sitting, Static Balance)  supervision  -RG      Sitting Position (Unsupported Sitting, Static Balance)  sitting on edge of bed;sitting edge of mat  -RG      Time Able to Maintain Position (Unsupported Sitting, Static Balance)  more than 5 minutes  -RG      Recorded by [RG] Prakash Haque PTA      Row Name 03/11/20 1507             Static Standing Balance    Level of [place] (Supported Standing, Static Balance)  minimal assist, 75% patient effort  -RG      Time Able to Maintain Position (Supported Standing, Static Balance)  30 to 45 seconds  -RG      Assistive Device Utilized (Supported Standing, Static Balance)  parallel bars  -RG      Comment (Supported Standing,  Static Balance)  standing on foam pad  -RG      Recorded by [RG] Prakash Haque PTA      Row Name 03/11/20 1507             Dynamic Standing Balance    Level of [place], Reaches Outside Midline (Standing, Dynamic Balance)  contact guard assist  -RG      Comment, Resists Mild Perturbations (Sitting, Dynamic Balance)  LE exercises.  H-T walk on foam beam  -RG      Recorded by [RG] Prakash Haque PTA      Row Name 03/11/20 1507             Standing Balance Activity    Activities Performed (Standing, Balance Training)  standing on foam roll  -RG      Support Needed for Balance (Standing, Balance Training)  uses at least one upper extremity for support  -RG      Recorded by [RG] Prakash Haque PTA      Row Name 03/11/20 1421             Upper Extremity Seated Therapeutic Exercise    Device, Seated Upper Extremity (Therapeutic Exercise)  -- hand exerciser  -CJ      Exercise Type, Seated Upper Extremity (Therapeutic Exercise)  AROM (active range of motion) BUE ther ex/act, bilat coord ex, GMC/FMC, fxl act tolerance  -CJ      Expected Outcomes, Seated Upper Extremity (Therapeutic Exercise)  improve functional tolerance, self-care activity;improve performance, BADLs;improve performance, transfer skills;improve performance, gross motor coordination skills;improve performance, fine motor coordination skills  -CJ      Recorded by [CJ] Daylin Rebolledo OT      Row Name 03/11/20 1507             Lower Extremity Standing Therapeutic Exercise    Performed, Standing Lower Extremity (Therapeutic Exercise)  hip flexion/extension;hip abduction/adduction;heel raises;mini-squats  -RG      Device, Standing Lower Extremity (Therapeutic Exercise)  parallel bars  -RG      Exercise Type, Standing Lower Extremity (Therapeutic Exercise)  AROM (active range of motion)  -RG      Expected Outcomes, Standing Lower Extremity (Therapeutic Exercise)  improve functional tolerance, community activity;improve functional tolerance, household  activity;improve functional tolerance, self-care activity;improve performance, gait skills;improve performance, transfer skills  -RG      Sets/Reps Detail, Standing Lower Extremity (Therapeutic Exercise)  25  -RG      Comment, Standing Lower Extremity (Therapeutic Exercise)  Tactile cues for task initiation  -RG      Recorded by [RG] Prakash Haque PTA      Row Name 03/11/20 1507             Lower Extremity Seated Therapeutic Exercise    Performed, Seated Lower Extremity (Therapeutic Exercise)  hip flexion/extension;hip abduction/adduction;knee flexion/extension;LAQ (long arc quad), knee extension;ankle dorsiflexion/plantarflexion  -RG      Device, Seated Lower Extremity (Therapeutic Exercise)  elastic bands/tubing;free weights, cuff;small ball  -RG      Exercise Type, Seated Lower Extremity (Therapeutic Exercise)  AROM (active range of motion)  -RG      Expected Outcomes, Seated Lower Extremity (Therapeutic Exercise)  improve functional tolerance, community activity;improve functional tolerance, household activity;improve functional tolerance, self-care activity;improve performance, gait skills;improve performance, transfer skills  -RG      Sets/Reps Detail, Seated Lower Extremity (Therapeutic Exercise)  25  -RG      Comment, Seated Lower Extremity (Therapeutic Exercise)  Cues for task initiation  -RG      Recorded by [RG] Prakash Hqaue PTA      Row Name 03/11/20 1507             Lower Extremity Supine Therapeutic Exercise    Performed, Supine Lower Extremity (Therapeutic Exercise)  hip abduction/adduction;hip external/internal rotation;SAQ (short arc quad) over bolster;SLR (straight leg raise);quadriceps sets;ankle pumps;heel slides  -RG      Device, Supine Lower Extremity (Therapeutic Exercise)  foam roll  -RG      Exercise Type, Supine Lower Extremity (Therapeutic Exercise)  AROM (active range of motion)  -RG      Expected Outcomes, Supine Lower Extremity (Therapeutic Exercise)  improve functional tolerance,  community activity;improve functional tolerance, self-care activity;improve functional tolerance, household activity;improve performance, gait skills;improve performance, transfer skills  -RG      Comment, Supine Lower Extremity (Therapeutic Exercise)  cues to stay on task  -RG      Recorded by [RG] Prakash Haque PTA      Row Name 03/11/20 1507 03/11/20 1421          Positioning and Restraints    Pre-Treatment Position  sitting in chair/recliner  -RG  --     Post Treatment Position  --  wheelchair  -CJ     In Wheelchair  notified nsg;sitting;call light within reach;encouraged to call for assist;legs elevated  -RG  with PT post both sessions  -CJ     Recorded by [RG] Prakash Haque PTA [CJ] Daylin Rebolledo, OT     Row Name 03/11/20 1507             Daily Summary of Progress (PT)    Impairments Continuing to Limit Function: Physical Therapy  strength decreased;impaired balance;impaired communication;impaired functional activity tolerance  -RG      Recorded by [RG] Prakash Haque PTA        User Key  (r) = Recorded By, (t) = Taken By, (c) = Cosigned By    Initials Name Effective Dates    CJ Daylin Rebolledo, OT 04/03/18 -     RG Prakash Haque PTA 10/31/19 -                  PT Recommendation and Plan        Daily Summary of Progress (PT)  Impairments Continuing to Limit Function: Physical Therapy: strength decreased, impaired balance, impaired communication, impaired functional activity tolerance               Time Calculation:     PT Charges     Row Name 03/11/20 1516 03/11/20 1515          Time Calculation    Start Time  1330  -RG  0915  -RG     Stop Time  1415  -RG  1010  -RG     Time Calculation (min)  45 min  -RG  55 min  -RG     PT Received On  --  03/11/20  -RG     PT Goal Re-Cert Due Date  --  03/12/20  -RG        Time Calculation- PT    Total Timed Code Minutes- PT  45 minute(s)  -RG  55 minute(s)  -RG       User Key  (r) = Recorded By, (t) = Taken By, (c) = Cosigned By    Initials Name Provider Type     Prakash Broussard PTA Physical Therapy Assistant          Therapy Charges for Today     Code Description Service Date Service Provider Modifiers Qty    65953995744 HC GAIT TRAINING EA 15 MIN 3/10/2020 Prakash Haque, JD GP 2    15373974300 HC PT THERAPEUTIC ACT EA 15 MIN 3/10/2020 Prakash Haque, JD GP 1    81031545788 HC PT THER PROC EA 15 MIN 3/10/2020 Prakash Haque, JD GP 3    63320692753 HC GAIT TRAINING EA 15 MIN 3/11/2020 Prakash Haque, JD GP 2    07275717284 HC PT THERAPEUTIC ACT EA 15 MIN 3/11/2020 Prakash Haque PTA GP 2    91235439347 HC PT THER PROC EA 15 MIN 3/11/2020 Prakash Haque PTA GP 3                   Prakash Haque PTA  3/11/2020

## 2020-03-11 NOTE — SIGNIFICANT NOTE
03/11/20 1045   Plan   Plan Explained to pt [facility] can accept her for admission with insurance approval.  Pt is agreeable to go to SNF for short-term rehab then wants to return home.  Informed pt insurance can take up to 72 hours to make a decision and SS will inform her when decision is made.     Patient/Family in Agreement with Plan yes

## 2020-03-11 NOTE — THERAPY TREATMENT NOTE
"Inpatient Rehabilitation - Speech Language Pathology Treatment Note   [place]     Patient Name: Connie Arango  : 1940  MRN: [id number]  Today's Date: 3/11/2020       Admit Date: 2020  Visit Dx:    No diagnosis found.  Patient Active Problem List   Diagnosis   • ICH (intracerebral hemorrhage) (CMS/[facility])        Therapy Treatment     Ms. Arango is seen this pm in speech therapy office for formal s/l therapy to address deficits. She is positioned upright and centered in wheelchair w/ gait belt attached. She is a/a cooperative to participate. She is evidenced w/ improvement in table top tasks this pm. She is noted w/ improvement in spontaneous speech however given severity level of aphasia is felt to be unsafe to return home w/o supervision. Her communication impairment negatively impacts pt's ability to functionally communicate in an emergency or to functionally complete adls w/o assistance. She is felt to most benefit from continued intense ST tx to address aphasia.    Long Term Goal:  1.Pt will improve receptive language skills to allow for maximal communication and safety in adls.   2.Pt will improve expressive language skills to allow for maximal communication and safety in adls.      Short Term Goals:  1. Pt will receptively identify common tangible objects from field of 2 w/ 50% acc and mod cues over 3 consecutive sessions.   *pt is able to identify \"sock, ball, mug, spoon, fork, pen, dice, hammer, comb, knife, cup, plate\" from FO3 given min-mod cues w/ 75% acc; Pt is able to match letters A-Z from FO3 w/ 90% acc given mod cues.  2. Pt will receptively demonstrate object fnx w/ 60% acc and mod cues over 3 consecutive sessions.   *pt able to receptively demonstrate object fnx of \"marker, spoon, cup, comb, brush, and mug\" w/ 75% acc given mod-max cues to initiate fnx of objects. Significant perseveration during this task. She is able to also verbalize \"bounce the ball, brush your hair, and look at it\" " "in reference to object fnx this pm.  3. Pt will id body parts w/ 40% acc and mod cues over 3 consecutive sessions.   *not directly addressed this session.  4. Pt will follow simple one step directives 3/5 opp w/ mod-max cues over 3 consecutive sessions.   *pt able to follow simple one step directives related to table top tasks w/ 80% acc given mod-max cues. Pt often requires multiple re-directions during tasks to sustain attention.  5. Pt will verbally produce biographical information 3/5 opp w/ mod-max cues over 3 consecutive session.   *pt verbally produces first and last name given mod cues w/ written name on table top. Pt is able to copy first and last name on table w/ mod max cues. She is also able to verbalize her son's name and her  given mod-max cues.  6. Pt will verbally respond to simple y/n questions w/ 75% acc and mod cues over 3 consecutive sessions.   *pt verbally responds to simple y/n questions w/ 75% acc related to adls, table top tasks. Pt often perseverates during this task.   7. Pt will confrontation name common tangible objects 2/5 opp w/ mod-max cues over 3 consecutive sessions.   *pt able to name \"brush, cup, spoon, ball, sock, mug, knife, etc\" after mod cues and models from SLP.  8. Pt will perform rote speech tasks 3/5 opp w/ mod cues over 3 consecutive sessions.   *pt able to name letters A-Z given mod-max cues w/ visual stimuli on table top. Perseveration noted w/ letters \"w,y,z,u, x\"; pt able to verbalize DEWEY and RASHAAD after visual table top cues and max modeling from SLP. Improvement in spontaneous speech this session as well as ability to participate in tasks.    *Additional goals to be added/modified as necessary.      Thank you-  Alexus Yanes M.S., CCC-SLP      EDUCATION  The patient has been educated in the following areas:   Cognitive Impairment Communication Impairment.    SLP Recommendation and Plan    Continue tx per poc.  Improvement in spontaneous speech this session as " well as ability to participate in tasks.She is noted w/ improvement in spontaneous speech however given severity level of aphasia is felt to be unsafe to return home w/o supervision. Her communication impairment negatively impacts pt's ability to functionally communicate in an emergency or to functionally complete adls w/o assistance. She is felt to most benefit from continued intense ST tx to address aphasia.    Time Calculation:     Time Calculation- SLP     Time Calculation- SLP     Row Name 03/11/20 1625    SLP Start Time  1500  -Recorded by: AMARSI ROCK Stop Time  1545  -Recorded by: AMARIS ROCK Time Calculation (min)  45 min  -Recorded by: AMARIS ROCK - Next Appointment  03/12/20  -Recorded by: AMARIS            User Key     Initials Name Provider Type    Alexus Lamar MS CCC-SLP Speech and Language Pathologist          Therapy Charges for Today     Code Description Service Date Service Provider Modifiers Qty    31755051166 HC ST TREATMENT SPEECH 6 3/10/2020 Alexus Yanes MS CCC-SLP GN 1    [id number] HC ST TREATMENT SPEECH 3 3/11/2020 Alexus Yanes MS CCC-SLP GN 1                     Alexus Yanes MS CCC-PRANAV  3/11/2020

## 2020-03-12 PROCEDURE — 97110 THERAPEUTIC EXERCISES: CPT

## 2020-03-12 PROCEDURE — 97116 GAIT TRAINING THERAPY: CPT

## 2020-03-12 PROCEDURE — 92507 TX SP LANG VOICE COMM INDIV: CPT | Performed by: SPEECH-LANGUAGE PATHOLOGIST

## 2020-03-12 PROCEDURE — 97535 SELF CARE MNGMENT TRAINING: CPT

## 2020-03-12 PROCEDURE — 97530 THERAPEUTIC ACTIVITIES: CPT

## 2020-03-12 RX ADMIN — FAMOTIDINE 40 MG: 20 TABLET, FILM COATED ORAL at 08:34

## 2020-03-12 RX ADMIN — ATORVASTATIN CALCIUM 40 MG: 40 TABLET, FILM COATED ORAL at 21:48

## 2020-03-12 RX ADMIN — CARVEDILOL 12.5 MG: 25 TABLET, FILM COATED ORAL at 17:11

## 2020-03-12 RX ADMIN — CARVEDILOL 12.5 MG: 25 TABLET, FILM COATED ORAL at 08:34

## 2020-03-12 RX ADMIN — FUROSEMIDE 20 MG: 20 TABLET ORAL at 08:34

## 2020-03-12 RX ADMIN — IMIPRAMINE HYDROCHLORIDE 10 MG: 10 TABLET ORAL at 21:48

## 2020-03-12 RX ADMIN — ASPIRIN 81 MG: 81 TABLET, CHEWABLE ORAL at 08:34

## 2020-03-12 RX ADMIN — POTASSIUM CHLORIDE 10 MEQ: 750 TABLET, FILM COATED, EXTENDED RELEASE ORAL at 08:34

## 2020-03-12 RX ADMIN — MAGNESIUM GLUCONATE 500 MG ORAL TABLET 400 MG: 500 TABLET ORAL at 08:34

## 2020-03-12 NOTE — PLAN OF CARE
Pt actively participated in formal s/l/cog tx this am. Continue tx per poc. Continues to make progress towards her goals. SLP will continue to address daily.   Problem: Patient Care Overview  Goal: Plan of Care Review  Outcome: Ongoing (interventions implemented as appropriate)     Problem: Communication Impairment (IRF) (Adult)  Goal: Optimal/Safe Level of Communication [place]  Outcome: Ongoing (interventions implemented as appropriate)  Flowsheets (Taken 2/29/2020 1409 by Patience Yanes MA,CCC-SLP)  Optimal/Safe Level of Communication [place]: demonstrating adequate progress  Goal: Optimal Quality of Life in Relation to Communication  Outcome: Ongoing (interventions implemented as appropriate)  Flowsheets (Taken 2/29/2020 1409 by Patience Yanes MA,CCC-SLP)  Optimal Quality of Life in Relation to Communication: demonstrating adequate progress

## 2020-03-12 NOTE — SIGNIFICANT NOTE
03/12/20 0840   Plan   Plan MD plans to contact [employer] Medicare replacement insurance this am for peer to peer review for SNF placement at [facility].

## 2020-03-12 NOTE — PROGRESS NOTES
Occupational Therapy: Individual: 60 minutes.    Physical Therapy:    Speech Language Pathology:    Signed by: Daylin Rebolledo, Occupational Therapist

## 2020-03-12 NOTE — PROGRESS NOTES
Occupational Therapy:    Physical Therapy: Individual: 50 minutes.    Speech Language Pathology:    Signed by: Heidi Ingram, Physical Therapist

## 2020-03-12 NOTE — THERAPY TREATMENT NOTE
Inpatient Rehabilitation - Occupational Therapy Treatment Note     [place]     Patient Name: Connie Arango  : 1940  MRN: [id number]    Today's Date: 3/12/2020                 Admit Date: 2020      Visit Dx:  No diagnosis found.    Patient Active Problem List   Diagnosis   • ICH (intracerebral hemorrhage) (CMS/HCC)         Therapy Treatment    IRF Treatment Summary     Row Name 20 1506 20 1458          Evaluation/Treatment Time and Intent    Subjective Information  no complaints agreeable to am therapy  -CJ  --     Existing Precautions/Restrictions  fall;seizures  -CJ  fall;seizures global aphasia  -LB     Document Type  therapy note (daily note)  -CJ  re-evaluation;therapy note (daily note)  -LB     Mode of Treatment  occupational therapy  -CJ  individual therapy;physical therapy  -LB     Patient/Family Observations  --  She has met part of goals for therapy. Plan was for SNF but now family is taking patient home tomorrow.  -LB     Unable to Perform (Evaluation/Treatment)  -- declined pm tx d/t not feeling well.   RN aware.  -CJ  -- she refused pm session due to c/o nausea  -LB     Recorded by [CJ] Daylin Rebolledo, OT [LB] Heidi Ingram, PT     Row Name 20 1506 20 1458          Cognition/Psychosocial- PT/OT    Affect/Mental Status (Cognitive)  --  -- aphasia-communication difficult  -LB     Follows Commands (Cognition)  physical/tactile prompts required;verbal cues/prompting required;repetition of directions required  -CJ  physical/tactile prompts required;verbal cues/prompting required  -LB     Personal Safety Interventions  --  gait belt;nonskid shoes/slippers when out of bed;supervised activity  -LB     Cognitive Function (Cognitive)  safety deficit  -CJ  --     Attention Deficit (Cognitive)  requires cues/redirection to task  -CJ  --     Safety Deficit (Cognitive)  safety precautions awareness;awareness of need for assistance;impulsivity  -CJ  --     Recorded by  [CJ] Daylin Rebolledo, OT [LB] Heidi Ingram, PT     Row Name 03/12/20 1506             Communication Assessment/Intervention    Additional Documentation  -- aphasia  -CJ      Recorded by [CJ] Daylin Rebolledo OT      Row Name 03/12/20 1458             Bed Mobility Assessment/Treatment    Bed Mobility Assessment/Treatment  --  -LB      Supine-Sit-Supine [place] (Bed Mobility)  verbal cues;nonverbal cues (demo/gesture) SBA  -LB      Recorded by [LB] Heidi Ingram, PT      Row Name 03/12/20 1458             Bed-Chair Transfer    Bed-Chair [place] (Transfers)  verbal cues;nonverbal cues (demo/gesture);stand by assist  -LB      Assistive Device (Bed-Chair Transfers)  wheelchair  -LB      Recorded by [LB] Heidi Ingram, PT      Row Name 03/12/20 1458             Chair-Bed Transfer    Chair-Bed [place] (Transfers)  verbal cues;nonverbal cues (demo/gesture);stand by assist  -LB      Assistive Device (Chair-Bed Transfers)  wheelchair  -LB      Recorded by [LB] Heidi Ingram, PT      Row Name 03/12/20 1458             Sit-Stand Transfer    Sit-Stand [place] (Transfers)  verbal cues;nonverbal cues (demo/gesture);stand by assist  -LB      Assistive Device (Sit-Stand Transfers)  walker, front-wheeled  -LB      Recorded by [LB] Heidi Ingram, PT      Row Name 03/12/20 1458             Stand-Sit Transfer    Stand-Sit [place] (Transfers)  verbal cues;nonverbal cues (demo/gesture);stand by assist  -LB      Assistive Device (Stand-Sit Transfers)  walker, front-wheeled  -LB      Recorded by [LB] Heidi Ingram, PT      Row Name 03/12/20 1458             Gait/Stairs Assessment/Training    [place] Level (Gait)  verbal cues;nonverbal cues (demo/gesture);stand by assist  -LB      Assistive Device (Gait)  walker, front-wheeled  -LB      Distance in Feet (Gait)  320'  -LB      Deviations/Abnormal Patterns (Gait)  dmitry decreased;gait speed decreased;stride  length decreased  -LB      Bilateral Gait Deviations  forward flexed posture  -LB      Recorded by [LB] Heidi Ingram, PT      Row Name 03/12/20 1458             Safety Issues, Functional Mobility    Impairments Affecting Function (Mobility)  balance;cognition;coordination;endurance/activity tolerance;motor control;motor planning;muscle tone abnormal;strength  -LB      Recorded by [LB] Heidi Ingram, PT      Row Name 03/12/20 1506             Grooming Assessment/Treatment    Grooming [place] Level  set up;supervision  -CJ      Recorded by [CJ] Daylin Rebolledo OT      Row Name 03/12/20 1458             Gross Motor Coordination    Gross Motor Impairments  -- <coordination  -LB      Recorded by [LB] Heidi Ingram, PT      Row Name 03/12/20 1458             Pain Scale: FACES Pre/Post-Treatment    Pain: FACES Scale, Pretreatment  0-->no hurt  -LB      Pain: FACES Scale, Post-Treatment  0-->no hurt  -LB      Recorded by [LB] Heidi Ingram, PT      Row Name 03/12/20 1458             Therapeutic Exercise    Therapeutic Exercise  aerobic exercise Simultaneous filing. User may not have seen previous data.  -CJ,LB      Recorded by [CJ,LB] Daylin Rebolledo OT (r) Heidi Ingram, PT (t)      Row Name 03/12/20 1506             Upper Extremity Seated Therapeutic Exercise    Exercise Type, Seated Upper Extremity (Therapeutic Exercise)  AROM (active range of motion) BUE ther ex/act, bilat coord ex, GMC/FMC  -CJ      Expected Outcomes, Seated Upper Extremity (Therapeutic Exercise)  improve functional tolerance, self-care activity;improve performance, BADLs;improve performance, transfer skills;improve performance, gross motor coordination skills;improve performance, fine motor coordination skills  -CJ      Recorded by [CJ] Daylin Rebolledo, OT      Row Name 03/12/20 1452             Aerobic Exercise Activity    Exercise Performed (Aerobic, Therapeutic Exercise)  recumbent stationary bike   -LB      Expected Outcome (Aerobic, Therapeutic Exercise)  improve functional tolerance, household activity;improve functional stability;improve performance, gait skills;improve performance, transfer skills;strengthen, facilitate independent active range of motion  -LB      Time (Aerobic, Therapeutic Exercise)  6 level 1.0  -LB      Recorded by [LB] Heidi Ingram, PT      Row Name 03/12/20 1458             Lower Extremity Supine Therapeutic Exercise    Performed, Supine Lower Extremity (Therapeutic Exercise)  heel slides;ankle pumps;gluteal sets;quadriceps sets;SAQ (short arc quad) over bolster;hip abduction/adduction  -LB      Sets/Reps Detail, Supine Lower Extremity (Therapeutic Exercise)  20 reps  -LB      Recorded by [LB] Heidi Ingram PT      Row Name 03/12/20 1506 03/12/20 1458          Positioning and Restraints    Pre-Treatment Position  --  sitting in chair/recliner  -LB     Post Treatment Position  wheelchair  -CJ  --     In Wheelchair  sitting;call light within reach;encouraged to call for assist  -CJ  with OT  -LB     Recorded by [CJ] Daylin Rebolledo, OT [LB] Heidi Ingram, PT       User Key  (r) = Recorded By, (t) = Taken By, (c) = Cosigned By    Initials Name Effective Dates    LB Heidi Ingram, PT 03/14/16 -     CJ Daylin Rebolledo OT 04/03/18 -                  OT Recommendation and Plan    Anticipated Equipment Needs At Discharge (OT Eval): (TBD)  Planned Therapy Interventions (OT Eval): activity tolerance training, BADL retraining, neuromuscular control/coordination retraining, occupation/activity based interventions, patient/caregiver education/training, ROM/therapeutic exercise, strengthening exercise, transfer/mobility retraining            OT IRF GOALS     Row Name 03/05/20 1700 03/05/20 1659          LB Dressing Goal 1 (OT-IRF)    Progress/Outcomes (LB Dressing Goal 1, OT-IRF)  --  goal ongoing  -AB        Toileting Goal 1 (OT-IRF)    [place] Level  (Toileting Goal 1, OT-IRF)  minimum assist (75% or more patient effort);contact guard assist  -AB  --     Time Frame (Toileting Goal 1, OT-IRF)  short term goal (STG);5 - 7 days  -AB  --     Progress/Outcomes (Toileting Goal 1, OT-IRF)  --  goal met  -AB       User Key  (r) = Recorded By, (t) = Taken By, (c) = Cosigned By    Initials Name Provider Type    Suha Vincent, OT Occupational Therapist                     Time Calculation:     Time Calculation- OT     Row Name 03/12/20 1509             Time Calculation- OT    OT Start Time  1055  -      OT Stop Time  1155  -      OT Time Calculation (min)  60 min  -      Total Timed Code Minutes- OT  60 minute(s)  -      OT Non-Billable Time (min)  15 min  -        User Key  (r) = Recorded By, (t) = Taken By, (c) = Cosigned By    Initials Name Provider Type     Daylin Rebolledo, OT Occupational Therapist          Therapy Charges for Today     Code Description Service Date Service Provider Modifiers Qty    34619641901 HC OT SELF CARE/MGMT/TRAIN EA 15 MIN 3/11/2020 Daylin Rebolledo OT GO 3    83387577325 HC OT THER PROC EA 15 MIN 3/11/2020 Daylin Rebolledo OT GO 2    05897717316 HC OT THERAPEUTIC ACT EA 15 MIN 3/11/2020 Daylin Rebolledo OT GO 3    65652794525 HC OT SELF CARE/MGMT/TRAIN EA 15 MIN 3/12/2020 Daylin Rebolledo OT GO 1    71062989782 HC OT THERAPEUTIC ACT EA 15 MIN 3/12/2020 Daylin Rebolledo OT GO 3                   Daylin Rebolledo OT  3/12/2020

## 2020-03-12 NOTE — DISCHARGE INSTR - OTHER ORDERS
[facility] [phone] for PT/OT/SLP 2-3 x wk x 8 wks, aide and nursing evaluations.    [facility] [phone] for rolling walker and bedside commode.

## 2020-03-12 NOTE — THERAPY TREATMENT NOTE
"Inpatient Rehabilitation - Speech Language Pathology Treatment Note  [facility]     Patient Name: Connie Arango  : 1940  MRN: [id number]  Today's Date: 3/12/2020       Admit Date: 2020  Visit Dx:    No diagnosis found.  Patient Active Problem List   Diagnosis   • ICH (intracerebral hemorrhage) (CMS/[facility])        Therapy Treatment     Ms. Arango is seen this am in speech therapy office for formal s/l therapy to address deficits. She is positioned upright and centered in wheelchair w/ gait belt attached. She is a/a cooperative to participate. She is evidenced w/ improvement in table top tasks this pm. She is noted w/ improvement in spontaneous speech however given severity level of aphasia is felt to be unsafe to return home w/o supervision. Her communication impairment negatively impacts pt's ability to functionally communicate in an emergency or to functionally complete adls w/o assistance. She is felt to most benefit from continued intense ST tx to address aphasia.    Long Term Goal:  1.Pt will improve receptive language skills to allow for maximal communication and safety in adls.   2.Pt will improve expressive language skills to allow for maximal communication and safety in adls.      Short Term Goals:  1. Pt will receptively identify common tangible objects from field of 2 w/ 50% acc and mod cues over 3 consecutive sessions.   *pt is able to identify \"sock, ball, mug, spoon, fork, pen, dice, hammer, comb, knife, cup, plate\" from FO3 given min-mod cues w/ 80% acc; Pt is able to match letters A-Z from FO3 w/ 90% acc given mod cues.  2. Pt will receptively demonstrate object fnx w/ 60% acc and mod cues over 3 consecutive sessions.   *pt able to receptively demonstrate object fnx of \"marker, spoon, cup, comb, mirror, brush, and mug\" w/ 75% acc given mod-max cues to initiate fnx of objects. Significant perseveration during this task. She is able to also verbalize \"bounce the ball, brush your hair, and look " "at it\" in reference to object fnx this pm.  3. Pt will id body parts w/ 40% acc and mod cues over 3 consecutive sessions.   *not directly addressed this session.  4. Pt will follow simple one step directives 3/5 opp w/ mod-max cues over 3 consecutive sessions.   *pt able to follow simple one step directives related to table top tasks w/ 80% acc given mod-max cues. Pt often requires multiple re-directions during tasks to sustain attention.  5. Pt will verbally produce biographical information 3/5 opp w/ mod-max cues over 3 consecutive session.   *pt verbally produces first and last name given mod cues w/ written name on table top. Pt is able to copy first and last name on table w/ mod max cues. She is also able to verbalize her son's name and her  given mod-max cues.  6. Pt will verbally respond to simple y/n questions w/ 75% acc and mod cues over 3 consecutive sessions.   *pt verbally responds to simple y/n questions w/ 75% acc related to adls, table top tasks. Pt often perseverates during this task.   7. Pt will confrontation name common tangible objects 2/5 opp w/ mod-max cues over 3 consecutive sessions.   *pt able to name \"brush, mirror, dice, cup, spoon, ball, sock, mug, knife, etc\" after mod cues and models from SLP.  8. Pt will perform rote speech tasks 3/5 opp w/ mod cues over 3 consecutive sessions.   * pt able to verbalize DEWEY and RASHAAD after visual table top cues and max modeling from SLP. Improvement in spontaneous speech this session as well as ability to participate in tasks.    *Additional goals to be added/modified as necessary.      Thank you-  Alexus Yanes M.S., CCC-SLP      EDUCATION  The patient has been educated in the following areas:   Cognitive Impairment Communication Impairment.    SLP Recommendation and Plan    Continue tx per poc.  Improvement in spontaneous speech this session as well as ability to participate in tasks.She is noted w/ improvement in spontaneous speech however given " severity level of aphasia is felt to be unsafe to return home w/o supervision. Her communication impairment negatively impacts pt's ability to functionally communicate in an emergency or to functionally complete adls w/o assistance. She is felt to most benefit from continued intense ST tx to address aphasia.    Time Calculation:     Time Calculation- SLP     Time Calculation- SLP     Row Name 03/12/20 1326    SLP Start Time  0920  -Recorded by: AMARIS ROCK Stop Time  1000  -Recorded by: AMARIS ROCK Time Calculation (min)  40 min  -Recorded by: AMARIS    SLP - Next Appointment  03/13/20  -Recorded by: AMARIS            User Key     Initials Name Provider Type    Alexus Lamar MS CCC-SLP Speech and Language Pathologist          Therapy Charges for Today     Code Description Service Date Service Provider Modifiers Qty    07201487636  ST TREATMENT SPEECH 3 3/11/2020 Alexus Yanes MS CCC-SLP GN 1    61564659233 HC ST TREATMENT SPEECH 3 3/12/2020 Alexus Yanes MS CCC-SLP GN 1                     Alexus Yanes MS CCC-PRANAV  3/12/2020

## 2020-03-12 NOTE — SIGNIFICANT NOTE
03/12/20 1040   Plan   Plan MD contacted [employer] Medicare replacement insurance [phone] per Dr. Patel for peer to peer review for SNF placement.  SS also talked to Dr. Patel who denied SNF admission due to pt not having skilled nursing need or needing two or more therapies, MD recommends assisted living facility or long-term NH placement.  Contacted [facility] [phone] per Valerie with this information.  Valerie says for pt to be admitted without insurance approval she would have to be private pay then apply for [place] Medicaid.  Pt is required to pay 30 days upfront which is $5460 then would apply for Medicaid which takes at least 30 days to process, therefore, pt will be private pay until approved for Medicaid.  [facility] would not reimburse pt's money once approved; she would have a credit.  Valerie suggested SS contact [facility] in [place] which provides personal care and memory care.  Contacted [facility] [phone] per Danay who states cost of personal care starts at $3500 per month based of level of assistance needed and memory care is $4500 per month; they do not bill Medicare or Medicaid; private pay or long-term care insurance is the payment sources.  Pt is on a fixed income, therefore, may not be able to afford this type of care.

## 2020-03-12 NOTE — SIGNIFICANT NOTE
03/12/20 1505   Plan   Plan Spoke to pt about insurance denying admission to [facility] for short-term rehab and plans for discharge home with son being primary caregiver tomorrow, recommendation for home health PT, OT, SLP, aide, nursing services, rolling walker and bedside commode.  Informed pt her brother will bring her son to rehab tomorrow for discharge and he will transport them home.  Explained to pt she will need 24 hour supervision at home and cannot be left alone.  Pt says she get a friend to assist too.  Pt does not have preference for home health or DME providers.  Contacted [facility] [phone] per rotation per Bertha who states they do not have SLP at this time.  Contacted [facility] [phone] per Katelynn with referral, discharge on 3-13-20 and orders for PT 2-3 x wk x 8 wks for strengthening, endurance, gait training, transfer training, balance, bed mobility, neuro re-education, home safety, coordination, aide evaluation for bathing/personal care, OT 2-3 x wk x 8 wks for ADL re-training, home safety, strengthening, home management, SLP 2-3 x wk x 8 wks for dysphagia, cognitive, expressive aphasia, apraxia, ADL's, safety, memory, and nursing evaluation, medication education, diet education.  Faxed  referral with orders, face sheet, H&P, PT/OT/SLP notes, MD progress notes, and x-rays to  [phone].  HH to be contacted at discharge.  Contacted [facility] [phone] per Veronique who states accepting pt's insurance with discharge on 3-13-20 and orders for rolling walker and bedside commode.  Faxed face sheet, H&P, PT/OT notes, MD progress notes, and DWO to [phone].  DME will be delivered around 11:30 am tomorrow to rehab.     Provided Post Acute Provider List? Yes   Post Acute Provider List DME Supplier;Home Health   Provided Post Acute Provider Quality & Resource List? Yes   Post Acute Provider Quality and Resource List Home Health   Delivered To Patient;Support Person    Support Person Rohan Nevarez-brother   Method of Delivery In person   Patient/Family in Agreement with Plan yes

## 2020-03-12 NOTE — SIGNIFICANT NOTE
03/11/20 5071   Plan   Plan Spoke to pt and brother about insurance MD requesting peer to peer review with Rehab MD by tomorrow at 12:00 pm.  Brother says there is no other family member who can provide 24 hour assistance except pt's son and they have concerns about him being her caregiver at this time.  Brother says son's phone is not working at this time.  Will follow.

## 2020-03-12 NOTE — PROGRESS NOTES
Occupational Therapy:    Physical Therapy:    Speech Language Pathology: Individual: 40 minutes.    Signed by: Alexus Yanes Speech therapist

## 2020-03-12 NOTE — PROGRESS NOTES
Rehabilitation Nursing  Inpatient Rehabilitation Plan of Care Note    Plan of Care  Pain    Pain Management (Active)  Current Status (2/26/2020 4:32:00 PM): Potential for pain  Weekly Goal: No pain this week  Discharge Goal: no pain    Safety    Potential for Injury (Active)  Current Status (2/26/2020 4:32:00 PM): At risk for injury  Weekly Goal: No injury this week  Discharge Goal: No injury    Signed by: Linsey Doan Nurse

## 2020-03-12 NOTE — DISCHARGE SUMMARY
DISCHARGE SUMMARY        Patient Identification:  Name:  Connie Arango  Age:  79 y.o.  Sex:  female  :  1940  MRN:  [id number]  Visit Number:  [id number]    Date of Admission: 2020  Date of Discharge:      PCP: Luis Enrique Prieto MD    Discharging Provider: Sean Coppola MD      Discharge Diagnoses     Multifocal CVA/left parietotemporal ICH   Small acute corical ischemic infarcts in the left parietal Lobe and right parieto-occipital region  Left parietotemporal hemorrhage  Encephalopathy  Acute hypoxia respiratory failure  HTN  Hypotension  Small bilateral pleural effusions  Dysphagia  Global aphasia     Consults     Consults     No orders found from 2020 to 2020.          History of Presenting Illness     Connie Arango is an 79 year old female with history of hypertension and hyperlipidemia who presented to [facility] ([facility]) as a direct transfer from and OSH w/slurred speech and aphasia. Her LKN was the afternoon of 2020. The patient lives alone and last spoke with her siblings on  and was in her normal state of health. On  when her siblings called they found her to have slurred speech and incorrect words. She was taken to OSH where a left parietotemporal ICH was demonstrated on imaging.  On arrival to [facility] her SBP was 198 mmHg and she was on a nicardipine gtt.   She was admitted to Neuro service patient was placed in 4 point restraints due to agitation and intubated for protection of airway and for patient safety, and arterial line was placed.    Prior to intubation the patient was agitated and repeatedly tried to crawl out of hospital bed.  The etiology of ICH was suspected to be secondary to HTN.  CT reveals “intraparenchymal hemorrhage is seen and left parietotemporal region with surrounding soft tissue edema. It measures 4.3 x 2.8 x 2.3 centimeters.”  On 02/10 labs included sodium 141, K+ 4.0, WBC 14.47, Hb: 13.6, HCT: 41.4, Plt: 235  PT: 12.8, INR: 1.0, and was ordered SCDS for DVT ppx at that time.  MRI head demonstrated no significant change in size of the left temporoparietal intraparenchymal hematoma, restricted diffusion within the brain parenchyma adjacent to the hematoma, particularly posteriorly, consistent with acute infarction, and additional small acute cortical ischemic infarcts in the left parietal lobe and right parieto-occipital region.  Patients ECHO EF 60-70% mild TR, RVSP 40-50.  CTA with possible small aneurysms at basilar tip and left ACOMM, right SCA severe stenosis, neurosurgery was consulted with no recs for surgical intervention.  Mechanism ruled likely cardioembolic with hemorrhagic conversion at left frontotemporal areas as multifocal CVAs evident on MRI are consistent with embolic etiology. Dysphagia team recommended tube feeds, and  Hal was placed.    Patient was extubated onto HFNC on .  On , patient became acutely hypotensive after getting PM meds. She had a low grade temp so she was given 1L IVF, pan cultured, started on broad spectrum antibiotics, and phenylephrine drip for a short time.  Patient was started on Vanc/Cefe/Flagyl initially and then follow cultures and if no growth plan to discontinue antibiotics on day 3.  Later in the shift her hypotensive episode resolved and early the next morning she had increasing hypoxia and agitation.  She was restarted on a precedex drip for agitation and given Lasix.  Patient placed on subcutaneous Heparin for DVT prophylaxis and 81 mg ASA daily.  On , patient weaned off of HFNC and was doing well on room air.  She is alert but remains aphasic, antibiotics were discontinued as no fever, no growth on cultures, and clinical improvement.  For intermittent agitation, she was continued on Seroquel.  CXR showed small bilateral pleural effusions which are stable, moderate on the right, small-to-moderate on the left, Prominence of central vascular pedicle, and  Slightly improved right lung aeration, in part due to redistribution of layering right sided pleural effusion.    On 02/13 was initial speech evaluation: patient alert, restless, bilateral upper extremity restraints in place. DHT and HFNC in place. Patient with significant communication deficits. Receptively, she followed 2 commands, was unable to reliably answer yes/no question (yes bias noted) unable to ID objects from a field of 2.  Expressively she was unable to complete automatic speech task or repeat.  Speech re-evaluated patient on 02/20 with diet recs puree with thin liquids, meds crushed in puree/pudding.  Feeding tube pulled and patient no longer requiring bilateral restraints.   BP improved.  Patient still noted with global aphasia.  Patient was noted to have poor po intake and was started on mirtazapine.  Labs on 02/23 included WBC 13.37, hgb 12.7, hct 39.4, Plt 415, creatinine 0.83, BUN 22, potassium 4.3, and sodium 140.       Patient continued to progress medically and PT/OT/speech evals completed which recommended acute inpatient rehab program post multifocal CVAs/left ICH for neuro and functional mobility, re-education with feeding and thought process and mobility needs. Acute inpatient rehab referral ordered for continued medical monitoring and intervention with post-acute multifocal CVAs/left ICH, continued neuro monitoring for changes or comps, anticoagulant therapy regulation, pain management needs, bowel and bladder management, dysphagia monitoring and intervention along with supervised meals and aspiration precautions, skin monitoring and breakdown prevention along with management of ongoing comorbidities that require intervention for regulation.      The preadmission mini-FIM score as assessed by the referring facility is as follows: Eating 2, Grooming 3, Bathing 2, Dressing-Upper Body 1, Dressing-Lower Body 1, Toileting 1, Transfers 1, Locomotion 1, Bowel Management 1, Bladder Management 1,  Comprehension 2, Expression 2, Social Interaction 3, Memory 1, and Problem Solving 1.      Patient required intensive inpatient physical therapy for therapeutic exercises, range of motion, endurance, gait training, safety, stairs training, strengthening for at least 1 to 2 hours a day for 5 to 6 days a week as well as occupational therapy for dressing, ADLs, feeding, home skills, safety and toileting techniques for 1 to 2 hours a day for 5 to 6 days a week, as well as speech and language pathology therapy for communication, expression, dysphasia, aspiration needs for 30 to 90 minutes a day for 5 to 6 days a week.  Patient continues to show significant level of dysphasia.    Hospital Course     Patient is very stable this morning with no complaints vitals are stable with 97% oxygenation on room air with no fever reported overnight.  She has no complaints and denies any chest pain shortness of breath nausea vomiting or diarrhea.  Overall patient has made significant progress during her stay at at the inpatient rehabilitation facility.  She is going home with her family today.    Patient participated with occupational therapy and physical therapy on 3/11/20. Patient's mobility continues to be full weight-bearing at present. Bed mobility is at contact guard with verbal and nonverbal cues with decreased use of arms for pushing/pulling and decreased use of legs for bridging/pushing but uses bed rails. Bed-chair transfer is at stand by assist with verbal and nonverbal cues and using a wheelchair. Chair-bed transfer is at stand by assist with verbal and nonverbal cues and using a wheelchair. Toilet transfer training is at stand by assist with verbal cues using the grab bars and safety frame. Patient ambulated 320 feet x2 at stand by assist using a front-wheeled walker with verbal and nonverbal cues.      Patient participated with occupational therapy, physical therapy, and speech therapy on 3/10/20. Patient's mobility is at  full weight-bearing on both sides. Bed mobility is at contact guard with verbal and nonverbal cues with decreased use of arms for pushing/pulling and decreased use of legs for bridging/pushing but using the bed rails. Sit-stand transfer is at stand by assist with verbal and nonverbal cues and using the front-wheeled walker. Patient ambulated for 320 feet in the AM and 320 feet in the PM at stand by assist with verbal and nonverbal cues and using a front-wheeled walker.       Discharge Vitals/Physical Examination     Vital Signs:  Temp:  [98.5 °F (36.9 °C)-98.8 °F (37.1 °C)] 98.5 °F (36.9 °C)  Heart Rate:  [71-80] 71  Resp:  [18-20] 18  BP: (128-159)/(64-77) 159/77  No data found.  SpO2 Percentage    03/10/20 1905 03/11/20 1907 03/12/20 1905   SpO2: 96% 94% 97%     SpO2:  [97 %] 97 %  on   ;   Device (Oxygen Therapy): room air    Body mass index is 26.45 kg/m².  Wt Readings from Last 3 Encounters:   02/26/20 72 kg (158 lb 11.7 oz)         Physical Exam:    General Appearance: alert and pleasant.    Seems to be doing very well with no evidence of acute distress or discomfort.     Head: normocephalic, without obvious abnormality and atraumatic     Eyes: lids and lashes normal, conjunctivae and sclerae normal, no icterus, no pallor, corneas clear and PERRLA     Lungs: clear to auscultation, respirations regular, respirations even and  respirations unlabored. No accessory muscle use.      Heart:: regular rhythm & normal rate, normal S1, S2, no murmur, no gallop, no rub and no click.  Chest wall with no abnormalities observed. PMI nondisplaced     Abdomen: normal bowel sounds in all quadrants, no masses, no hepatomegaly, no splenomegaly, soft non-tender, no guarding and no rebound tenderness     Extremities: no edema, no cyanosis, no redness, no tenderness, no clubbing     Musculoskeletal: joints with no effusion nor erythema nor warmth.  Pedal pulses palpable and equal bilaterally     Skin: no bleeding, bruising or rash  and no lesions noted     Lymph Nodes: no palpable adenopathy     Neurologic: Alert and awake.              Sensory intact in BLE and BUE.              Motor strength Generalized weakness    Pertinent Laboratory/Radiology Results     Pertinent Laboratory Results:                        Invalid input(s): PROTEstimated Creatinine Clearance: 55.7 mL/min (by C-G formula based on SCr of 0.74 mg/dL).  No results found for: AMMONIA    No results found for: HGBA1C, POCGLU  No results found for: HGBA1C  No results found for: TSH, FREET4    No results found for: BLOODCX  No results found for: URINECX  No results found for: WOUNDCX  No results found for: STOOLCX  No results found for: RESPCX  Pain Management Panel     There is no flowsheet data to display.          Pertinent Radiology Results:    Imaging Results (All)     Procedure Component Value Units Date/Time    FL Video Swallow Single Contrast [[id number]] Collected:  02/27/20 0952     Updated:  02/27/20 0954    Narrative:       EXAMINATION: FL VIDEO SWALLOW SINGLE-CONTRAST-      CLINICAL INDICATION:     dysphagia     TECHNIQUE: Modified barium swallow was performed utilizing video  fluoroscopy in conjunction with the Speech Pathology team. The patient  ingested multiple barium media including thin barium, nectar, and honey  liquid as well as barium pudding and a barium pudding coated cracker.      FLUOROSCOPY TIME: 1.88 minutes     COMPARISON: NONE      FINDINGS:   Premature loss is noted with vallecular pooling.  No episodes of aspiration or penetration identified with all tested  consistencies.          Impression:       1.  No aspiration.  2. Please see dysphasia notes for further details.     This report was finalized on 2/27/2020 9:52 AM by Dr. Westley Castillo MD.             Test Results Pending at Discharge:        Discharge Disposition/Discharge Medications/Discharge Appointments     Discharge Disposition:  Home with home health and DME  Home or Self Care    Code  Status While Inpatient:    Code Status and Medical Interventions:   Ordered at: 02/26/20 1948     Code Status:    CPR     Medical Interventions (Level of Support Prior to Arrest):    Full       Discharge Medications:       Discharge Medications      New Medications      Instructions Start Date   aspirin 81 MG chewable tablet   81 mg, Oral, Daily      atorvastatin 40 MG tablet  Commonly known as:  LIPITOR   40 mg, Oral, Nightly      carvedilol 12.5 MG tablet  Commonly known as:  COREG   12.5 mg, Oral, 2 Times Daily With Meals      famotidine 40 MG tablet  Commonly known as:  PEPCID   40 mg, Oral, Daily         Continue These Medications      Instructions Start Date   furosemide 20 MG tablet  Commonly known as:  LASIX   20 mg, Oral, Daily      imipramine 10 MG tablet  Commonly known as:  TOFRANIL   10 mg, Oral, Every Morning      potassium chloride 10 MEQ CR tablet  Commonly known as:  K-DUR   10 mEq, Oral, Daily         Stop These Medications    metoprolol succinate XL 50 MG 24 hr tablet  Commonly known as:  TOPROL-XL     raNITIdine 300 MG tablet  Commonly known as:  ZANTAC     simvastatin 20 MG tablet  Commonly known as:  ZOCOR            Discharge Appointments:    Your Scheduled Appointments     MS. IVERSON HAS AN APPOINTMENT TO SEE DR. FRANCO ON: 03- 20 @ 10:00  [phone]    MS. IVERSON HAS AN APPOINTMENT TO SEE DR. FISHER ON: 06- 23 @ 12:00  [phone]               Additional Instructions for the Follow-ups that You Need to Schedule     Ambulatory Referral to Home Health   As directed      Face to Face Visit Date:  3/13/2020    Follow-up provider for Plan of Care?:  I treated the patient in an acute care facility and will not continue treatment after discharge.    Follow-up provider:  DARA FRANCO [[id number]]    Reason/Clinical Findings:  intracranial hemorrhage, dementia, debility, dysphagia, hypertension    Describe mobility limitations that make leaving home difficult:  taxing effort to leaving home      Nursing/Therapeutic Services Requested:  Skilled Nursing Physical Therapy Occupational Therapy    Skilled nursing orders:  Medication education Neurovascular assessments    PT orders:  Home safety assessment Strengthening Therapeutic exercise Gait Training Transfer training    Weight Bearing Status:  As Tolerated    Occupational orders:  Home safety assessment Activities of daily living Strengthening Cognition    Frequency:  1 Week 1            Contact information for follow-up providers     Dara Franco MD .    Specialty:  Family Medicine  Contact information:  [address] [facility] [address]  [phone]                   Contact information for after-discharge care     [facility]     [facility] [place] [facility]    Service:  Home Health Services  Contact information:  [address]  [facility] [address] [phone]                             Additional Instructions for the Follow-ups that You Need to Schedule     Ambulatory Referral to Home Health   As directed      Face to Face Visit Date:  3/13/2020    Follow-up provider for Plan of Care?:  I treated the patient in an acute care facility and will not continue treatment after discharge.    Follow-up provider:  DARA FRANCO [[id number]]    Reason/Clinical Findings:  intracranial hemorrhage, dementia, debility, dysphagia, hypertension    Describe mobility limitations that make leaving home difficult:  taxing effort to leaving home    Nursing/Therapeutic Services Requested:  Skilled Nursing Physical Therapy Occupational Therapy    Skilled nursing orders:  Medication education Neurovascular assessments    PT orders:  Home safety assessment Strengthening Therapeutic exercise Gait Training Transfer training    Weight Bearing Status:  As Tolerated    Occupational orders:  Home safety assessment Activities of daily living Strengthening Cognition    Frequency:  1 Week 1               Condition at Discharge:    Stable,  much improved with no issues today    Discharge Plan Of Care:      Patient will be discharge home with home health and DME. We spoke to patient about insurance denying admission to [facility] for short-term rehab and plans for discharge home with son being primary caregiver, recommendation for home health PT, OT, SLP, aide, nursing services, rolling walker and bedside commode. We Informed patient that her brother will bring her son to rehab at discharge and he will transport them home. She will need 24 hour supervision at home and cannot be left alone.  Patient states she have a friend to assist, too.  Patient does not have preference for home health or DME providers.  Contacted [facility] [phone] per Katelynn with referral, discharge on 3-13-20 and orders for PT 2-3 a week for 8 weeks for strengthening, endurance, gait training, transfer training, balance, bed mobility, neuro re-education, home safety, coordination, and aide evaluation for bathing/personal care. OT 2-3 times a week fo 8 weeks for ADL re-training, home safety, strengthening, home management. SLP recommended 2-3 times a week for 8 weeks for dysphagia, cognitive, expressive aphasia, apraxia, ADL's, safety, memory, and nursing evaluation, medication education, and diet education.   contacted [facility] [phone] per Veronique who states accepting patient's insurance with discharge on 3-13-20 and orders for rolling walker and bedside commode. DME will be delivered around 11:30 am to rehab today.    Please note that this discharge summary required more than 30 minutes to complete.          Sean Coppola MD  03/13/20  5:54 AM

## 2020-03-12 NOTE — THERAPY RE-EVALUATION
Inpatient Rehabilitation - Physical Therapy Re-Evaluation        [facility]     Patient Name: Connie Arango  : 1940  MRN: [id number]    Today's Date: 3/12/2020                    Admit Date: 2020      Visit Dx: No diagnosis found.    Patient Active Problem List   Diagnosis   • ICH (intracerebral hemorrhage) (CMS/HCC)       Past Medical History:   Diagnosis Date   • Coronary artery disease    • GERD (gastroesophageal reflux disease)    • Hypertension    • Intracranial hemorrhage (CMS/HCC)        Past Surgical History:   Procedure Laterality Date   • CHOLECYSTECTOMY            PT ASSESSMENT (last 12 hours)      IRF Physical Therapy Evaluation    No documentation.       IRF Treatment Summary     Row Name 20 1506 20 1458          Evaluation/Treatment Time and Intent    Subjective Information  no complaints agreeable to therapy  -CJ  --     Existing Precautions/Restrictions  fall;seizures  -CJ  fall;seizures global aphasia  -LB     Document Type  therapy note (daily note)  -CJ  re-evaluation;therapy note (daily note)  -LB     Mode of Treatment  occupational therapy  -CJ  individual therapy;physical therapy  -LB     Patient/Family Observations  --  She has met part of goals for therapy. Plan was for SNF but now family is taking patient home tomorrow.  -LB     Unable to Perform (Evaluation/Treatment)  --  -- she refused pm session due to c/o nausea  -LB     Recorded by [CJ] Daylin Rebolledo, OT [LB] Heidi Ingram, PT     Row Name 20 1506 20 1458          Cognition/Psychosocial- PT/OT    Affect/Mental Status (Cognitive)  --  -- aphasia-communication difficult  -LB     Follows Commands (Cognition)  physical/tactile prompts required;verbal cues/prompting required;repetition of directions required  -  physical/tactile prompts required;verbal cues/prompting required  -LB     Personal Safety Interventions  --  gait belt;nonskid shoes/slippers when out of bed;supervised activity  -LB      Cognitive Function (Cognitive)  safety deficit  -CJ  --     Attention Deficit (Cognitive)  requires cues/redirection to task  -CJ  --     Safety Deficit (Cognitive)  safety precautions awareness;awareness of need for assistance;impulsivity  -CJ  --     Recorded by [CJ] Daylin Rebolledo OT [LB] Heidi Ingram, PT     Row Name 03/12/20 1458             Bed Mobility Assessment/Treatment    Bed Mobility Assessment/Treatment  --  -LB      Supine-Sit-Supine Traverse (Bed Mobility)  verbal cues;nonverbal cues (demo/gesture) SBA  -LB      Recorded by [LB] Heidi Ingram, PT      Row Name 03/12/20 1458             Bed-Chair Transfer    Bed-Chair Traverse (Transfers)  verbal cues;nonverbal cues (demo/gesture);stand by assist  -LB      Assistive Device (Bed-Chair Transfers)  wheelchair  -LB      Recorded by [LB] Heidi Ingram, PT      Row Name 03/12/20 1458             Chair-Bed Transfer    Chair-Bed Traverse (Transfers)  verbal cues;nonverbal cues (demo/gesture);stand by assist  -LB      Assistive Device (Chair-Bed Transfers)  wheelchair  -LB      Recorded by [LB] Heidi Ingram, PT      Row Name 03/12/20 1458             Sit-Stand Transfer    Sit-Stand Traverse (Transfers)  verbal cues;nonverbal cues (demo/gesture);stand by assist  -LB      Assistive Device (Sit-Stand Transfers)  walker, front-wheeled  -LB      Recorded by [LB] Heidi Ingram, PT      Row Name 03/12/20 1458             Stand-Sit Transfer    Stand-Sit Traverse (Transfers)  verbal cues;nonverbal cues (demo/gesture);stand by assist  -LB      Assistive Device (Stand-Sit Transfers)  walker, front-wheeled  -LB      Recorded by [LB] Heidi Ingram, PT      Row Name 03/12/20 1458             Gait/Stairs Assessment/Training    Traverse Level (Gait)  verbal cues;nonverbal cues (demo/gesture);stand by assist  -LB      Assistive Device (Gait)  walker, front-wheeled  -LB      Distance in Feet (Gait)   320'  -LB      Deviations/Abnormal Patterns (Gait)  dmitry decreased;gait speed decreased;stride length decreased  -LB      Bilateral Gait Deviations  forward flexed posture  -LB      Recorded by [LB] Heidi Ingram, PT      Row Name 03/12/20 1458             Safety Issues, Functional Mobility    Impairments Affecting Function (Mobility)  balance;cognition;coordination;endurance/activity tolerance;motor control;motor planning;muscle tone abnormal;strength  -LB      Recorded by [LB] Heidi Ingram, PT      Row Name 03/12/20 1458             Gross Motor Coordination    Gross Motor Impairments  -- <coordination  -LB      Recorded by [LB] Heidi Ingram, PT      Row Name 03/12/20 1458             Pain Scale: FACES Pre/Post-Treatment    Pain: FACES Scale, Pretreatment  0-->no hurt  -LB      Pain: FACES Scale, Post-Treatment  0-->no hurt  -LB      Recorded by [LB] Heidi Ingram, PT      Row Name 03/12/20 1458             Therapeutic Exercise    Therapeutic Exercise  aerobic exercise Simultaneous filing. User may not have seen previous data.  -CJ,LB      Recorded by [CJ,LB] Daylin Rebolledo OT (r) Heidi Ingram, PT (t)      Row Name 03/12/20 1458             Aerobic Exercise Activity    Exercise Performed (Aerobic, Therapeutic Exercise)  recumbent stationary bike  -LB      Expected Outcome (Aerobic, Therapeutic Exercise)  improve functional tolerance, household activity;improve functional stability;improve performance, gait skills;improve performance, transfer skills;strengthen, facilitate independent active range of motion  -LB      Time (Aerobic, Therapeutic Exercise)  6 level 1.0  -LB      Recorded by [LB] Heidi Ignram, PT      Row Name 03/12/20 1458             Lower Extremity Supine Therapeutic Exercise    Performed, Supine Lower Extremity (Therapeutic Exercise)  heel slides;ankle pumps;gluteal sets;quadriceps sets;SAQ (short arc quad) over bolster;hip  abduction/adduction  -LB      Sets/Reps Detail, Supine Lower Extremity (Therapeutic Exercise)  20 reps  -LB      Recorded by [JORGE L] Heidi Ingram PT      Row Name 03/12/20 0630             Positioning and Restraints    Pre-Treatment Position  sitting in chair/recliner  -LB      In Wheelchair  with OT  -LB      Recorded by [JORGE L] Heidi Ingram PT        User Key  (r) = Recorded By, (t) = Taken By, (c) = Cosigned By    Initials Name Effective Dates    Heidi Steen, PT 03/14/16 -     CJ Daylin Rebolledo, OT 04/03/18 -             PT Recommendation and Plan    Planned Therapy Interventions (PT Eval): balance training, bed mobility training, gait training, motor coordination training, neuromuscular re-education, patient/family education, strengthening, transfer training  Frequency of Treatment (PT Eval): 5 times per week  Anticipated Equipment Needs at Discharge (PT Eval): (tbd)                  Time Calculation:     PT Charges     Row Name 03/12/20 1507             Time Calculation    Start Time  1000  -LB      Stop Time  1050  -LB      Time Calculation (min)  50 min  -LB      PT Received On  03/12/20  -LB      PT Goal Re-Cert Due Date  03/19/20  -LB        User Key  (r) = Recorded By, (t) = Taken By, (c) = Cosigned By    Initials Name Provider Type    Heidi Steen PT Physical Therapist          Therapy Charges for Today     Code Description Service Date Service Provider Modifiers Qty    28540385059 HC GAIT TRAINING EA 15 MIN 3/12/2020 Heidi Ingram, PT GP 1    26394938136 HC PT THER PROC EA 15 MIN 3/12/2020 Heidi Ingram, PT GP 2                   Heidi Ingram PT  3/12/2020

## 2020-03-12 NOTE — PROGRESS NOTES
Rehabilitation Nursing  Inpatient Rehabilitation Plan of Care Note    Plan of Care  Pain    Pain Management (Active)  Current Status (2/26/2020 4:32:00 PM): Potential for pain  Weekly Goal: No pain this week  Discharge Goal: no pain    Safety    Potential for Injury (Active)  Current Status (2/26/2020 4:32:00 PM): At risk for injury  Weekly Goal: No injury this week  Discharge Goal: No injury    Signed by: Nurse Vandana

## 2020-03-13 VITALS
HEIGHT: 65 IN | TEMPERATURE: 98.2 F | OXYGEN SATURATION: 97 % | DIASTOLIC BLOOD PRESSURE: 74 MMHG | SYSTOLIC BLOOD PRESSURE: 148 MMHG | WEIGHT: 158.73 LBS | HEART RATE: 72 BPM | BODY MASS INDEX: 26.45 KG/M2 | RESPIRATION RATE: 20 BRPM

## 2020-03-13 PROCEDURE — 97535 SELF CARE MNGMENT TRAINING: CPT

## 2020-03-13 PROCEDURE — 92507 TX SP LANG VOICE COMM INDIV: CPT | Performed by: SPEECH-LANGUAGE PATHOLOGIST

## 2020-03-13 RX ORDER — ASPIRIN 81 MG/1
81 TABLET, CHEWABLE ORAL DAILY
Qty: 30 TABLET | Refills: 0 | Status: SHIPPED | OUTPATIENT
Start: 2020-03-13

## 2020-03-13 RX ORDER — ATORVASTATIN CALCIUM 40 MG/1
40 TABLET, FILM COATED ORAL NIGHTLY
Qty: 30 TABLET | Refills: 0 | Status: SHIPPED | OUTPATIENT
Start: 2020-03-13

## 2020-03-13 RX ORDER — FAMOTIDINE 40 MG/1
40 TABLET, FILM COATED ORAL DAILY
Qty: 30 TABLET | Refills: 0 | Status: SHIPPED | OUTPATIENT
Start: 2020-03-13 | End: 2020-04-12

## 2020-03-13 RX ORDER — CARVEDILOL 12.5 MG/1
12.5 TABLET ORAL 2 TIMES DAILY WITH MEALS
Qty: 60 TABLET | Refills: 0 | Status: SHIPPED | OUTPATIENT
Start: 2020-03-13 | End: 2020-04-12

## 2020-03-13 RX ADMIN — ASPIRIN 81 MG: 81 TABLET, CHEWABLE ORAL at 08:44

## 2020-03-13 RX ADMIN — CARVEDILOL 12.5 MG: 25 TABLET, FILM COATED ORAL at 08:43

## 2020-03-13 RX ADMIN — FUROSEMIDE 20 MG: 20 TABLET ORAL at 08:44

## 2020-03-13 RX ADMIN — FAMOTIDINE 40 MG: 20 TABLET, FILM COATED ORAL at 08:44

## 2020-03-13 RX ADMIN — POTASSIUM CHLORIDE 10 MEQ: 750 TABLET, FILM COATED, EXTENDED RELEASE ORAL at 08:44

## 2020-03-13 RX ADMIN — MAGNESIUM GLUCONATE 500 MG ORAL TABLET 400 MG: 500 TABLET ORAL at 08:44

## 2020-03-13 NOTE — SIGNIFICANT NOTE
03/13/20 0920   Plan   Plan Spoke to Veronique with [facility] [phone] who states pt has a deductible which is over $800, therefore, would need to pay $168.82 for rolling walker and bedside commode.  Received voicemail from Zuleyma with [facility] stating their agency is out-of-network with pt's insurance and she has a significant deductible and $10,000 maximum out-of-pocket.  Contacted brother Rohan [phone] to review this information about DME and home health.  Brother says son can bring pt's checkbook to pay for DME.  SS will contact [facility] about providing services; they informed SS yesterday they did not have SLP at the time.  Son does not drive, brother lives in another town, and pt does not have Medicaid to pay for [facility] transport to outpatient therapy.     Patient/Family in Agreement with Plan yes

## 2020-03-13 NOTE — THERAPY TREATMENT NOTE
"Inpatient Rehabilitation - Speech Language Pathology Treatment Note   [facility]     Patient Name: Connie Arango  : 1940  MRN: [id number]  Today's Date: 3/13/2020       Admit Date: 2020  Visit Dx:      ICD-10-CM ICD-9-CM   1. Nontraumatic intracerebral hemorrhage, unspecified cerebral location, unspecified laterality (CMS/HCC) I61.9 431     Patient Active Problem List   Diagnosis   • ICH (intracerebral hemorrhage) (CMS/[facility])        Therapy Treatment     Ms. Arango is seen this am in speech therapy office for formal s/l therapy to address deficits. She is positioned upright and centered in wheelchair w/ gait belt attached. She is a/a cooperative to participate. She is noted w/ improvement in spontaneous speech however given severity level of aphasia is felt to be unsafe to return home w/o supervision. Her communication impairment negatively impacts pt's ability to functionally communicate in an emergency or to functionally complete adls w/o assistance. She is felt to most benefit from continued intense ST tx to address aphasia.    Long Term Goal:  1.Pt will improve receptive language skills to allow for maximal communication and safety in adls.   2.Pt will improve expressive language skills to allow for maximal communication and safety in adls.      Short Term Goals:  1. Pt will receptively identify common tangible objects from field of 2 w/ 50% acc and mod cues over 3 consecutive sessions.   *pt is able to identify \"sock, ball, spoon, hairbrush, toothbrush, key, bottle opener, pen, dice, hammer, comb, knife, cup, plate\" from FO3 given min-mod cues w/ 80% acc; Pt is able to match letters A-Z from FO3 w/ 80% acc given mod cues.  2. Pt will receptively demonstrate object fnx w/ 60% acc and mod cues over 3 consecutive sessions.   *pt able to receptively demonstrate object fnx of \"sock, ball, spoon, hairbrush, toothbrush, key, bottle opener, pen, dice, hammer, comb, knife, cup, plate\" w/ 75% acc given mod-max " "cues to initiate fnx of objects. Significant perseveration during this task.   3. Pt will id body parts w/ 40% acc and mod cues over 3 consecutive sessions.   *not directly addressed this session.  4. Pt will follow simple one step directives 3/5 opp w/ mod-max cues over 3 consecutive sessions.   *pt able to follow simple one step directives related to table top tasks w/ 80% acc given mod-max cues. Pt often requires multiple re-directions during tasks to sustain attention.  5. Pt will verbally produce biographical information 3/5 opp w/ mod-max cues over 3 consecutive session.   *pt verbally produces first and last name given mod cues w/ written name on table top. Pt is able to copy first and last name on table w/ mod max cues. She is also able to verbalize her son's name and her  given mod-max cues.  6. Pt will verbally respond to simple y/n questions w/ 75% acc and mod cues over 3 consecutive sessions.   *pt verbally responds to simple y/n questions w/ 75% acc related to adls, table top tasks. Pt often perseverates during this task.   7. Pt will confrontation name common tangible objects 2/5 opp w/ mod-max cues over 3 consecutive sessions.   *pt able to name \"sock, ball, spoon, hairbrush, toothbrush, key, bottle opener, pen, dice, hammer, comb, knife, cup, plate\" after mod cues and models from SLP.  8. Pt will perform rote speech tasks 3/5 opp w/ mod cues over 3 consecutive sessions.   * pt able to verbalize DEWEY w/o visual cues this date; phonemic cue for \"Monday\" only to begin rote task. Pt verbalizes RASHAAD after visual table top cues and max modeling from SLP. Improvement in spontaneous speech this session as well as ability to participate in tasks.    *Additional goals to be added/modified as necessary.      Thank you-  Patty Snow M.A., CCC-SLP      EDUCATION  The patient has been educated in the following areas:   Cognitive Impairment Communication Impairment.    SLP Recommendation and Plan    Continue " tx per poc.  Improvement in spontaneous speech this session as well as ability to participate in tasks.She is noted w/ improvement in spontaneous speech however given severity level of aphasia is felt to be unsafe to return home w/o supervision. Her communication impairment negatively impacts pt's ability to functionally communicate in an emergency or to functionally complete adls w/o assistance. She is felt to most benefit from continued intense ST tx to address aphasia.    Time Calculation:     Time Calculation- SLP     Row Name 03/13/20 0923             Time Calculation- SLP    SLP Start Time  0820  -      SLP Stop Time  0915  -      SLP Time Calculation (min)  55 min  -        User Key  (r) = Recorded By, (t) = Taken By, (c) = Cosigned By    Initials Name Provider Type     Patty Snow MA,CCC-SLP Speech and Language Pathologist          Therapy Charges for Today     Code Description Service Date Service Provider Modifiers Qty    32746858082  ST TREATMENT SPEECH 4 3/13/2020 Patty Snow MA,CCC-SLP GN 1                     Patty Snow MA,CCC-SLP  3/13/2020

## 2020-03-13 NOTE — SIGNIFICANT NOTE
03/13/20 1110   Plan   Plan Spoke to pt, son Dilip and brother Rohan about discharge, [facility] not being in-network with insurance, and referral to [facility] who will start care on 3-14-20.  Family found a rolling walker that pt had a home.  Contacted [facility] [phone] per Veronique about pt only needing bedside commode; cost is $90.91.  Informed son pt cannot be alone and needs 24 hour supervision at home; voiced understanding.  Pt is going home with son Dilip providing assistance and supervision.  Brother will provide transportation home.   Patient/Family in Agreement with Plan yes   Final Discharge Disposition Code 06 - home with home health care

## 2020-03-13 NOTE — PROGRESS NOTES
Patient Assessment Instrument  Quality Indicators - Discharge    Section GG. Self-Care Performance     Eating: [place] sets up or cleans up; patient completes activity. [place] assists  only prior to or following the activity.   Oral Hygiene: [place] provides verbal cues and/or touching/steadying and/or  contact guard assistance as patient completes activity.   Toileting Hygiene: : [place] provides verbal cues and/or touching/steadying  and/or contact guard assistance as patient completes activity.   Shower/Bathe Self: [place] provides verbal cues and/or touching/steadying and/or  contact guard assistance as patient completes activity.   Upper Body Dressing: [place] provides verbal cues and/or touching/steadying  and/or contact guard assistance as patient completes activity.   Lower Body Dressing: [place] provides verbal cues and/or touching/steadying  and/or contact guard assistance as patient completes activity.   Putting On/Taking Off Footwear: [place] provides verbal cues and/or  touching/steadying and/or contact guard assistance as patient completes  activity.    Section GG. Mobility Performance      Section J. Health Conditions Discharge      Section M. Skin Conditions Discharge      . Current Number of Unhealed Pressure Ulcers      Section N. Medication    Signed by: Daylin Rebolledo, Occupational Therapist

## 2020-03-13 NOTE — THERAPY DISCHARGE NOTE
Inpatient Rehabilitation - IRF Physical Therapy Discharge Summary  LEWIS Alfaro       Patient Name: Connie Arango  : 1940  MRN: [id number]    Today's Date: 3/13/2020                 Admit Date: 2020      PT Recommendation and Plan    Visit Dx:    ICD-10-CM ICD-9-CM   1. Nontraumatic intracerebral hemorrhage, unspecified cerebral location, unspecified laterality (CMS/[facility]) I61.9 431                     Therapy Suggested Charges     Code   Minutes Charges    None             Therapy Charges for Today     Code Description Service Date Service Provider Modifiers Qty    95273280853 HC GAIT TRAINING EA 15 MIN 3/12/2020 Heidi Ingram, PT GP 1    66952038682 HC PT THER PROC EA 15 MIN 3/12/2020 Heidi Ingram, PT GP 2          PT Discharge Summary  Reason for Discharge: Discharge from facility  Outcomes Achieved: Patient able to partially acheive established goals  Discharge Destination: Home with home health, Home with assist(Patient and family member given written HEP, red t-band, and home safety packet. Discussed safety info.)      Heidi Ingram, PT   3/13/2020

## 2020-03-13 NOTE — SIGNIFICANT NOTE
03/13/20 1040   Plan   Plan Contacted [facility] [phone] per Raven who states being in-network with [employer] Medicare replacement and says they have just received approval today for SLP which is contracted with [facility] SLP to in-home therapy.  Provided referral, discharge today, and orders for PT, OT, SLP, aide, and nursing evaluations.  Faxed discharge summary with face to face, PT/OT/SLP notes, MD progress notes, H&P, face sheet, and x-ray to  [phone].   will start care on 3-14-20.

## 2020-03-13 NOTE — PROGRESS NOTES
Occupational Therapy: Individual: 15 minutes.    Physical Therapy:    Speech Language Pathology:    Signed by: Daylin Rebolledo, Occupational Therapist

## 2020-03-13 NOTE — PROGRESS NOTES
Patient Assessment Instrument  Quality Indicators - Discharge    Section GG. Self-Care Performance      Section GG. Mobility Performance     Roll Left and Right: [place] provides verbal cues and/or touching/steadying  and/or contact guard assistance as patient completes activity. Assistance may be  provided throughout the activity or intermittently.   Sit to Lying: [place] provides verbal cues and/or touching/steadying and/or  contact guard assistance as patient completes activity. Assistance may be  provided throughout the activity or intermittently.   Lying to Sitting on Side of Bed: [place] provides verbal cues and/or  touching/steadying and/or contact guard assistance as patient completes  activity. Assistance may be provided throughout the activity or intermittently.   Sit to Stand: [place] provides verbal cues and/or touching/steadying and/or  contact guard assistance as patient completes activity. Assistance may be  provided throughout the activity or intermittently.   Chair/Bed to Chair Transfer: [place] provides verbal cues and/or  touching/steadying and/or contact guard assistance as patient completes  activity. Assistance may be provided throughout the activity or intermittently.   Toilet Transfer Not attempted due to medical or safety concerns.   Car Transfer: Not attempted due to medical or safety concerns.   Walk 10 Feet:   [place] provides verbal cues and/or touching/steadying and/or  contact guard assistance as patient completes activity. Assistance may be  provided throughout the activity or intermittently.  Walk 50 Feet with 2 Turns:   [place] provides verbal cues and/or  touching/steadying and/or contact guard assistance as patient completes  activity. Assistance may be provided throughout the activity or intermittently.  Walk 150 Feet:   [place] provides verbal cues and/or touching/steadying and/or  contact guard assistance as patient completes activity. Assistance may be  provided throughout the  activity or intermittently.  Walking 10 Feet on Uneven Surfaces:   Not attempted due to medical or safety  concerns.  1 Step Over Curb or Up/Down Stair:   Not attempted due to medical or safety  concerns.  Picking up an Object:   Not attempted due to medical or safety concerns. Uses  Wheelchair and/or Scooter: No    Section J. Health Conditions Discharge      Section M. Skin Conditions Discharge      . Current Number of Unhealed Pressure Ulcers      Section N. Medication    Signed by: Heidi Ingram, Physical Therapist

## 2020-03-13 NOTE — PROGRESS NOTES
Occupational Therapy:    Physical Therapy:    Speech Language Pathology: Individual: 55 minutes.    Signed by: Alexus Yanes Speech therapist

## 2020-03-16 NOTE — PROGRESS NOTES
Patient Assessment Instrument  Quality Indicators - Discharge    Section GG. Self-Care Performance      Section GG. Mobility Performance      Section J. Health Conditions Discharge  Fall(s) Since Admission:  No    Section M. Skin Conditions Discharge  Unhealed Pressure Ulcer(s)/Injurie(s) at Stage 1 or Higher:  No    . Current Number of Unhealed Pressure Ulcers      Section N. Medication    Medication Intervention: N/A - There were no potential clinically significant  medication issues identified since admission or patient is not taking any  medications.    Signed by: Loly Estrada, Supervisor

## 2020-03-24 NOTE — PROGRESS NOTES
PPS CMG Coordinator  Inpatient Rehabilitation Discharge    Mode of Locomotion: Walking.    Discharge Against Medical Advice:  No.  Discharge Information  Patient Discharged Alive:  Yes  Discharge Destination/Living Setting: Home with Home Health Services  Diagnosis for Interruption/Death:    Impairment Group: Stroke: 01.9 Other Stroke    Comorbidities: Rank Code      Description      1    J96.01    Acute respiratory failure with hypoxia  2    G93.40    Encephalopathy, unspecified  3    J90       Pleural effusion, not elsewhere classified  4    I95.9     Hypotension, unspecified  5    I10       Essential (primary) hypertension  6    E78.5     Hyperlipidemia, unspecified  7    I25.10    Atherosclerotic heart disease of native                 coronary artery without angina pectoris  8    K21.9     Gastro-esophageal reflux disease without                 esophagitis  9    R53.81    Other malaise  10   R47.01    Aphasia  11   R13.10    Dysphagia, unspecified    Complications:      KRISTEN Bladder Accidents: 0  - Accidents.  Patient used medications/device this  shift.  2/26/2020 4:32:00 PM  Bladder Score = 6. Patient has not had an accident, but uses a  device/medication.  KRISTEN Bowel Accident: 0  - Accidents.  Patient used medications/device this shift.   2/26/2020 4:32:00 PM  Bowel Score = 6. Patient has no accidents, but uses a device/medications.    Signed by: Rosa Lyle, Supervisor

## 2020-09-01 ENCOUNTER — TRANSCRIBE ORDERS (OUTPATIENT)
Dept: ADMINISTRATIVE | Facility: HOSPITAL | Age: 80
End: 2020-09-01

## 2020-09-01 DIAGNOSIS — I61.6 MULTIPLE LEFT-SIDED NONTRAUMATIC LOCALIZED INTRACEREBRAL HEMORRHAGES (HCC): Primary | ICD-10-CM

## 2020-09-08 ENCOUNTER — HOSPITAL ENCOUNTER (OUTPATIENT)
Dept: MRI IMAGING | Facility: HOSPITAL | Age: 80
Discharge: HOME | End: 2020-09-08
Admitting: NURSE PRACTITIONER

## 2020-09-08 DIAGNOSIS — I61.6 MULTIPLE LEFT-SIDED NONTRAUMATIC LOCALIZED INTRACEREBRAL HEMORRHAGES (HCC): ICD-10-CM

## 2020-09-08 PROCEDURE — 70551 MRI BRAIN STEM W/O DYE: CPT | Performed by: RADIOLOGY

## 2020-09-08 PROCEDURE — 70551 MRI BRAIN STEM W/O DYE: CPT

## 2021-08-03 NOTE — THERAPY DISCHARGE NOTE
Dr Meeks-  Refill request received from patients [facility] pharmacy on [address] for patients ferrous sulfate 325mg tabs. Will patient be continuing to take ferrous sulfate now that she is going to be getting IV Iron Sucrose?  Please advise if refill should be completed.  Thank you.    Inpatient Rehabilitation - Occupational Therapy  /Discharge   Dominic     Patient Name: Connie Arango  : 1940  MRN: [id number]  Today's Date: 3/13/2020               Admit Date: 2020    Visit Dx:     ICD-10-CM ICD-9-CM   1. Nontraumatic intracerebral hemorrhage, unspecified cerebral location, unspecified laterality (CMS/HCC) I61.9 431     Patient Active Problem List   Diagnosis   • ICH (intracerebral hemorrhage) (CMS/[facility])       Therapy Treatment  IRF Treatment Summary     Row Name 20 1443             Evaluation/Treatment Time and Intent    Existing Precautions/Restrictions  fall;seizures  -CJ      Document Type  discharge evaluation/summary  -CJ      Mode of Treatment  occupational therapy  -CJ      Patient/Family Observations  Education completed with pt and family re:  ADL status, safety, aphasia, DME, home program, precautions, 24 hr supervision, and D/C concerns.  Verbalized understanding.  -CJ      Recorded by [CJ] Daylin Rebolledo OT      Row Name 20 1443             Cognition/Psychosocial- PT/OT    Follows Commands (Cognition)  physical/tactile prompts required;repetition of directions required;verbal cues/prompting required;increased processing time needed  -CJ      Personal Safety Interventions  gait belt;nonskid shoes/slippers when out of bed;supervised activity  -CJ      Cognitive Function (Cognitive)  safety deficit  -CJ      Attention Deficit (Cognitive)  requires cues/redirection to task  -CJ      Safety Deficit (Cognitive)  safety precautions awareness;awareness of need for assistance;impulsivity;insight into deficits/self awareness  -CJ      Recorded by [CJ] Daylin Rebolledo OT      Row Name 20 1444             Communication Assessment/Intervention    Additional Documentation  -- aphasia  -CJ      Recorded by [CJ] Daylin Rebolledo OT      Row Name 20 1443             Toilet Transfer    [place] (Toilet Transfer)  stand by assist;verbal cues  -CJ       Assistive Device (Toilet Transfer)  grab bars/safety frame  -CJ      Recorded by [CJ] Daylin Rebolledo, OT      Row Name 03/13/20 1443             Bathing Assessment/Treatment    Comment (Bathing)  CGA  -CJ      Recorded by [CJ] Daylin Rebolledo, OT      Row Name 03/13/20 1443             Upper Body Dressing Assessment/Treatment    Comment (Upper Body Dressing)  Set up/ supervision  -CJ      Recorded by [CJ] Daylin Rebolledo, OT      Row Name 03/13/20 1443             Lower Body Dressing Assessment/Treatment    Comment (Lower Body Dressing)  CGA  -CJ      Recorded by [CJ] Daylin Rebolledo, OT      Row Name 03/13/20 1443             Grooming Assessment/Treatment    Comment (Grooming)  Set up/ supervision  -CJ      Recorded by [CJ] Daylin Rebolledo, OT      Row Name 03/13/20 1443             Toileting Assessment/Treatment    Assistive Device Use (Toileting)  grab bar/safety frame  -CJ      Comment (Toileting)  CGA  -CJ      Recorded by [CJ] Daylin Rebolledo, OT      Row Name 03/13/20 1443             Self-Feeding Assessment/Treatment    [place] Level (Self-Feeding)  set up;supervision  -CJ      Recorded by [CJ] Daylin Rebolledo, OT      Row Name 03/13/20 1443             Vision Assessment/Intervention    Visual Impairment/Limitations  corrective lenses full time  -CJ      Recorded by [CJ] Dayiln Rebolledo, OT      Row Name 03/13/20 1443             Positioning and Restraints    Post Treatment Position  wheelchair  -CJ      In Wheelchair  sitting;call light within reach;encouraged to call for assist;with family/caregiver  -CJ      Recorded by [CJ] Daylin Rebolledo OT        User Key  (r) = Recorded By, (t) = Taken By, (c) = Cosigned By    Initials Name Effective Dates    CJ Daylin Rebolledo OT 04/03/18 -                  OT IRF GOALS     Row Name 03/13/20 1457 03/05/20 1700 03/05/20 1659       Bathing Goal 1 (OT-IRF)    Progress/Outcomes (Bathing Goal 1, OT-IRF)  goal met  -  --  --       LB Dressing Goal 1  (OT-IRF)    Progress/Outcomes (LB Dressing Goal 1, OT-IRF)  goal met  -  --  goal ongoing  -AB       LB Dressing Goal 2 (OT-IRF)    Progress/Outcomes (LB Dressing Goal 2, OT-IRF)  goal met  -CJ  --  --       Toileting Goal 1 (OT-IRF)    [place] Level (Toileting Goal 1, OT-IRF)  --  minimum assist (75% or more patient effort);contact guard assist  -AB  --    Time Frame (Toileting Goal 1, OT-IRF)  --  short term goal (STG);5 - 7 days  -AB  --    Progress/Outcomes (Toileting Goal 1, OT-IRF)  goal met  -CJ  --  goal met  -AB       Toileting Goal 2 (OT-IRF)    Progress/Outcomes (Toileting Goal 2, OT-IRF)  goal met  -  --  --      User Key  (r) = Recorded By, (t) = Taken By, (c) = Cosigned By    Initials Name Provider Type    Suha Vincent, OT Occupational Therapist    Daylin Beckman, OT Occupational Therapist                OT Recommendation and Plan  Anticipated Equipment Needs At Discharge (OT Eval): (TBD)  Anticipated Discharge Disposition (OT): home with 24/7 care, home with home health  Planned Therapy Interventions (OT Eval): activity tolerance training, BADL retraining, neuromuscular control/coordination retraining, occupation/activity based interventions, patient/caregiver education/training, ROM/therapeutic exercise, strengthening exercise, transfer/mobility retraining               Time Calculation:    Time Calculation- OT     Row Name 03/13/20 1458             Time Calculation- OT    Total Timed Code Minutes- OT  15 minute(s)  -      OT Non-Billable Time (min)  25 min  -        User Key  (r) = Recorded By, (t) = Taken By, (c) = Cosigned By    Initials Name Provider Type    Daylin Beckman, OT Occupational Therapist          Therapy Charges for Today     Code Description Service Date Service Provider Modifiers Qty    30431370555  OT SELF CARE/MGMT/TRAIN EA 15 MIN 3/12/2020 Daylin Rebolledo OT GO 1    [id number]  OT THERAPEUTIC ACT EA 15 MIN 3/12/2020 Daylin Rebolledo OT  GO 3    26053157523 HC OT SELF CARE/MGMT/TRAIN EA 15 MIN 3/13/2020 Daylin Rebolledo OT GO 1               OT Discharge Summary  Anticipated Discharge Disposition (OT): home with 24/7 care, home with home health  Reason for Discharge: Discharge from facility  Discharge Destination: Home with home health, Home with assist    Daylin Rebolledo OT  3/13/2020

## 2023-06-07 NOTE — PROGRESS NOTES
PROGRESS NOTE         Patient Identification:  Name:  Connie Arango  Age:  79 y.o.  Sex:  female  :  1940  MRN:  [id number]  Visit Number:  [id number]  Primary Care Provider:  Luis Enrique Prieto MD         LOS: 10 days       ----------------------------------------------------------------------------------------------------------------------  Subjective       Chief Complaints:    Intracranial hemorrhage   Encephalopathy   debility      Interval History:      Patient participated with speech therapy, occupational therapy, physical therapy on 3/6/20. Patient mobility is at full weight-bearing on both sides. Bed mobility is at contact guard with verbal and nonverbal cues using bed rails. Patient has decreased use of arms for pushing/pulling and decreased use of legs for bridging/pushing. Bed-chair transfer is at contact guard with verbal and nonverbal cues using a wheelchair. Sit-stand transfer is at contact guard with verbal and nonverbal cues using a front-wheeled walker. Toilet transfer training is at contact guard with verbal cues using grab bars and safety frame. Patient ambulated 160 feet and 320 feet is at stand by assist/contact guard assist with verbal and nonverbal cues using a front-wheeled walker.       Review of Systems:    Constitutional: no fever, chills and night sweats.  Eyes: no eye drainage, itching or redness.  HEENT: no mouth sores, dysphagia or nose bleed.  Respiratory: no for shortness of breath, cough or production of sputum.  Cardiovascular: no chest pain, no palpitations, no orthopnea.  Gastrointestinal: no nausea, vomiting or diarrhea. No abdominal pain, hematemesis or rectal bleeding.  Genitourinary: no dysuria or polyuria.  Hematologic/lymphatic: no lymph node abnormalities, no easy bruising or easy bleeding.  Musculoskeletal: no muscle or joint pain.  Skin: No rash and no itching.  Neurological: no loss of consciousness, no seizure, no headache.  Behavioral/Psych: no  depression or suicidal ideation.  Endocrine: no hot flashes.  Immunologic: negative.  ----------------------------------------------------------------------------------------------------------------------      Objective       [facility] Meds:    aspirin 81 mg Oral Daily   atorvastatin 40 mg Oral Nightly   carvedilol 12.5 mg Oral BID With Meals   famotidine 40 mg Oral Daily   furosemide 20 mg Oral Daily   imipramine 10 mg Oral Nightly   magnesium oxide 400 mg Oral Daily   potassium chloride 10 mEq Oral Daily        ----------------------------------------------------------------------------------------------------------------------    Vital Signs:  Temp:  [98.1 °F (36.7 °C)] 98.1 °F (36.7 °C)  Heart Rate:  [81] 81  Resp:  [16] 16  BP: (147)/(67) 147/67  No data found.  SpO2 Percentage    03/04/20 1914 03/05/20 1911 03/06/20 1912   SpO2: 94% 94% 96%     SpO2:  [96 %] 96 %  on   ;   Device (Oxygen Therapy): room air    Body mass index is 26.45 kg/m².  Wt Readings from Last 3 Encounters:   02/26/20 72 kg (158 lb 11.7 oz)        Intake/Output Summary (Last 24 hours) at 3/7/2020 0901  Last data filed at 3/7/2020 0500  Gross per 24 hour   Intake 800 ml   Output --   Net 800 ml     Diet Pureed; Thin  ----------------------------------------------------------------------------------------------------------------------      Physical Exam:     General Appearance: alert and pleasant.  Up eating breakfast.    Head: normocephalic, without obvious abnormality and atraumatic     Eyes: lids and lashes normal, conjunctivae and sclerae normal, no icterus, no pallor, corneas clear and PERRLA     Ears: ears appear intact with no abnormalities noted     Nose: nares normal, septum midline, mucosa normal and no drainage                Throat: no oral lesions, no thrush, oral mucosa moist and oopharynx normal     Neck: no adenopathy, supple, trachea midline, no thyromegaly, no carotid bruit and no JVD     Back: no kyphosis present, no  scoliosis present, no skin lesions, erythema, or scars, no tenderness to percussion or palpation and range of motion normal     Lungs: clear to auscultation, respirations regular, respirations even and  respirations unlabored. No accessory muscle use.      Heart:: regular rhythm & normal rate, normal S1, S2, no murmur, no gallop, no rub and no click.  Chest wall with no abnormalities observed. PMI nondisplaced     Abdomen: normal bowel sounds in all quadrants, no masses, no hepatomegaly, no splenomegaly, soft non-tender, no guarding and no rebound tenderness     Extremities: no edema, no cyanosis, no redness, no tenderness, no clubbing     Musculoskeletal: joints with no effusion nor erythema nor warmth.  Pedal pulses palpable and equal bilaterally     Skin: no bleeding, bruising or rash and no lesions noted     Lymph Nodes: no palpable adenopathy     Neurologic: Alert and awake.  Mild aphasia.              Sensory intact in BLE and BUE.              Motor strength Generalized weakness  ----------------------------------------------------------------------------------------------------------------------            Results from last 7 days   Lab Units 03/06/20  0152   WBC 10*3/mm3 4.80   HEMOGLOBIN g/dL 11.2*   HEMATOCRIT % 35.6   MCV fL 89.0   MCHC g/dL 31.5   PLATELETS 10*3/mm3 193     Results from last 7 days   Lab Units 03/06/20  0152   SODIUM mmol/L 142   POTASSIUM mmol/L 3.9   MAGNESIUM mg/dL 2.3   CHLORIDE mmol/L 106   CO2 mmol/L 23.7   BUN mg/dL 11   CREATININE mg/dL 0.74   EGFR IF NONAFRICN AM mL/min/1.73 76   CALCIUM mg/dL 9.2   GLUCOSE mg/dL 101*   ALBUMIN g/dL 3.16*   BILIRUBIN mg/dL 0.4   ALK PHOS U/L 73   AST (SGOT) U/L 34*   ALT (SGPT) U/L 26   Estimated Creatinine Clearance: 55.7 mL/min (by C-G formula based on SCr of 0.74 mg/dL).  No results found for: AMMONIA    No results found for: HGBA1C, POCGLU  No results found for: HGBA1C  No results found for: TSH, FREET4    No results found for: BLOODCX  No  results found for: URINECX  No results found for: WOUNDCX  No results found for: STOOLCX  No results found for: RESPCX  Pain Management Panel     There is no flowsheet data to display.            ----------------------------------------------------------------------------------------------------------------------  Imaging Results (Last 24 Hours)     ** No results found for the last 24 hours. 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 Patient mobility is at full weight-bearing on both sides. Bed mobility is at contact guard with verbal and nonverbal cues using bed rails. Patient has decreased use of arms for pushing/pulling and decreased use of legs for bridging/pushing. Bed-chair transfer is at contact guard with verbal and nonverbal cues using a wheelchair. Sit-stand transfer is at contact guard with verbal and nonverbal cues using a front-wheeled walker. Toilet transfer training is at contact guard with verbal cues using grab bars and safety frame. Patient ambulated 160 feet and 320 feet is at stand by assist/contact guard assist with verbal and nonverbal cues using a front-wheeled walker.     Patient participated with occupational therapy, speech therapy, and physical therapy on 3/5/20. Patient's mobility is at full weight-bearing on both sides. Bed-chair transfer is at contact guard with verbal and nonverbal cues and using a wheelchair. Stand-sit transfer is at  contact guard with verbal and nonverbal cues using a front-wheeled walker. Stand pivot/stand step transfer is at contact guard with verbal and nonverbal cues using a front-wheeled walker. Patient ambulated 300 feet x2 at contact guard with verbal and nonverbal cues using a front-wheeled walker.        Code Status:   Code Status and Medical Interventions:   Ordered at: 02/26/20 1948     Code Status:    CPR     Medical Interventions (Level of Support Prior to Arrest):    Full         Sean Coppola MD  03/07/20  9:01 AM         Yes